# Patient Record
Sex: MALE | Race: WHITE | NOT HISPANIC OR LATINO | Employment: FULL TIME | URBAN - METROPOLITAN AREA
[De-identification: names, ages, dates, MRNs, and addresses within clinical notes are randomized per-mention and may not be internally consistent; named-entity substitution may affect disease eponyms.]

---

## 2017-01-16 ENCOUNTER — OFFICE VISIT (OUTPATIENT)
Dept: FAMILY MEDICINE | Facility: CLINIC | Age: 32
End: 2017-01-16
Payer: COMMERCIAL

## 2017-01-16 VITALS
HEART RATE: 72 BPM | RESPIRATION RATE: 16 BRPM | SYSTOLIC BLOOD PRESSURE: 110 MMHG | DIASTOLIC BLOOD PRESSURE: 62 MMHG | WEIGHT: 268 LBS | TEMPERATURE: 98.4 F | BODY MASS INDEX: 37.39 KG/M2

## 2017-01-16 DIAGNOSIS — F33.1 MAJOR DEPRESSIVE DISORDER, RECURRENT EPISODE, MODERATE (H): ICD-10-CM

## 2017-01-16 DIAGNOSIS — F41.1 GENERALIZED ANXIETY DISORDER: ICD-10-CM

## 2017-01-16 DIAGNOSIS — G47.26 SHIFT WORK SLEEP DISORDER: Primary | ICD-10-CM

## 2017-01-16 PROCEDURE — 99213 OFFICE O/P EST LOW 20 MIN: CPT | Performed by: PHYSICIAN ASSISTANT

## 2017-01-16 RX ORDER — QUETIAPINE FUMARATE 25 MG/1
50 TABLET, FILM COATED ORAL AT BEDTIME
Qty: 180 TABLET | Refills: 1 | Status: SHIPPED | OUTPATIENT
Start: 2017-01-16 | End: 2017-07-09

## 2017-01-16 ASSESSMENT — ANXIETY QUESTIONNAIRES
2. NOT BEING ABLE TO STOP OR CONTROL WORRYING: NOT AT ALL
3. WORRYING TOO MUCH ABOUT DIFFERENT THINGS: NOT AT ALL
IF YOU CHECKED OFF ANY PROBLEMS ON THIS QUESTIONNAIRE, HOW DIFFICULT HAVE THESE PROBLEMS MADE IT FOR YOU TO DO YOUR WORK, TAKE CARE OF THINGS AT HOME, OR GET ALONG WITH OTHER PEOPLE: NOT DIFFICULT AT ALL
GAD7 TOTAL SCORE: 1
6. BECOMING EASILY ANNOYED OR IRRITABLE: NOT AT ALL
5. BEING SO RESTLESS THAT IT IS HARD TO SIT STILL: NOT AT ALL
7. FEELING AFRAID AS IF SOMETHING AWFUL MIGHT HAPPEN: NOT AT ALL
1. FEELING NERVOUS, ANXIOUS, OR ON EDGE: NOT AT ALL

## 2017-01-16 ASSESSMENT — PATIENT HEALTH QUESTIONNAIRE - PHQ9: 5. POOR APPETITE OR OVEREATING: SEVERAL DAYS

## 2017-01-16 NOTE — NURSING NOTE
"Chief Complaint   Patient presents with     Recheck Medication       Initial /62 mmHg  Pulse 72  Temp(Src) 98.4  F (36.9  C) (Oral)  Resp 16  Wt 268 lb (121.564 kg) Estimated body mass index is 37.39 kg/(m^2) as calculated from the following:    Height as of 10/27/15: 5' 11\" (1.803 m).    Weight as of this encounter: 268 lb (121.564 kg).  BP completed using cuff size: joanne Castro MA      "

## 2017-01-16 NOTE — PROGRESS NOTES
SUBJECTIVE:                                                    Harrison Khan is a 31 year old male who presents to clinic today for the following health issues:      Medication Followup of Seroquel- insomnia    Taking Medication as prescribed: yes    Side Effects:  None    Medication Helping Symptoms:  yes     Kwaku is here to follow up insomnia, depression and anxiety.  He has a long standing problem with all three.  They have at times been very difficult to control.  The insomnia in particular to the point of concerns about all the medications he was taking.  He has not been in for a while now.  Today he reports to be just taking Seroquel.  NOthing specific for sleep at this time.  Takes 1-2 at night.  Anxiety and depression are well controlled.  Travelling a lot right now.  Working a lot.  Hours are variable right now but feeling OK with schedule.  Going to gym which is helping with mood etc.        PROBLEMS TO ADD ON...    Depression and Anxiety Follow-Up    Status since last visit: Improved    Other associated symptoms:None    Complicating factors:     Significant life event: No     Current substance abuse: None    PHQ-9 SCORE 3/30/2016 6/28/2016 1/16/2017   Total Score - - -   Total Score 0 5 1     ELY-7 SCORE 3/30/2016 6/28/2016 1/16/2017   Total Score - - -   Total Score 2 4 1        PHQ-9  English      PHQ-9   Any Language     GAD7     Problem list and histories reviewed & adjusted, as indicated.  Additional history: as documented    Problem list, Medication list, Allergies, and Medical/Social/Surgical histories reviewed in EPIC and updated as appropriate.    ROS:  Constitutional, HEENT, cardiovascular, pulmonary, gi and gu systems are negative, except as otherwise noted.    OBJECTIVE:                                                    /62 mmHg  Pulse 72  Temp(Src) 98.4  F (36.9  C) (Oral)  Resp 16  Wt 268 lb (121.564 kg)  Body mass index is 37.39 kg/(m^2).  GENERAL: healthy, alert and no  distress  PSYCH: mentation appears normal, affect normal/bright  No further exam 15 minutes total with discussion and counselling equal to >%50 of visit.    Diagnostic Test Results:  none      ASSESSMENT/PLAN:                                                    1. Shift work sleep disorder  Refilled seroquel, follow up 6 months, sooner prn.  - QUEtiapine (SEROQUEL) 25 MG tablet; Take 2 tablets (50 mg) by mouth At Bedtime Take 1-2 tabs PO qhs prn insomnia  Dispense: 180 tablet; Refill: 1    2. Major depressive disorder, recurrent episode, moderate (H)  - doing well.  Follow up prn.    PHQ-9 SCORE 3/30/2016 6/28/2016 1/16/2017   Total Score - - -   Total Score 0 5 1       3. Generalized anxiety disorder  - doing well, follow up prn.    ELY-7 SCORE 3/30/2016 6/28/2016 1/16/2017   Total Score - - -   Total Score 2 4 1           Eliseo Benitez PA-C  [facility]

## 2017-01-16 NOTE — Clinical Note
My Depression Action Plan  Name: Harrison Khan   Date of Birth 1985  Date: 1/16/2017    My doctor: Eliseo Benitez   My clinic: [address], [address]  [facility] [address] [phone]          GREEN    ZONE   Good Control    What it looks like:     Things are going generally well. You have normal up s and down s. You may even feel depressed from time to time, but bad moods usually last less than a day.   What you need to do:  1. Continue to care for yourself (see self care plan)  2. Check your depression survival kit and update it as needed  3. Follow your physician s recommendations including any medication.  4. Do not stop taking medication unless you consult with your physician first.           YELLOW         ZONE Getting Worse    What it looks like:     Depression is starting to interfere with your life.     It may be hard to get out of bed; you may be starting to isolate yourself from others.    Symptoms of depression are starting to last most all day and this has happened for several days.     You may have suicidal thoughts but they are not constant.   What you need to do:     1. Call your care team, your response to treatment will improve if you keep your care team informed of your progress. Yellow periods are signs an adjustment may need to be made.     2. Continue your self-care, even if you have to fake it!    3. Talk to someone in your support network    4. Open up your depression survival kit           RED    ZONE Medical Alert - Get Help    What it looks like:     Depression is seriously interfering with your life.     You may experience these or other symptoms: You can t get out of bed most days, can t work or engage in other necessary activities, you have trouble taking care of basic hygiene, or basic responsibilities, thoughts of suicide or death that will not go away, self-injurious behavior.     What you need to  do:  1. Call your care team and request a same-day appointment. If they are not available (weekends or after hours) call your local crisis line, emergency room or 911.      Electronically signed by: Lindsay Castro, January 16, 2017    Depression Self Care Plan / Survival Kit    Self-Care for Depression  Here s the deal. Your body and mind are really not as separate as most people think.  What you do and think affects how you feel and how you feel influences what you do and think. This means if you do things that people who feel good do, it will help you feel better.  Sometimes this is all it takes.  There is also a place for medication and therapy depending on how severe your depression is, so be sure to consult with your medical provider and/ or Behavioral Health Consultant if your symptoms are worsening or not improving.     In order to better manage my stress, I will:    Exercise  Get some form of exercise, every day. This will help reduce pain and release endorphins, the  feel good  chemicals in your brain. This is almost as good as taking antidepressants!  This is not the same as joining a gym and then never going! (they count on that by the way ) It can be as simple as just going for a walk or doing some gardening, anything that will get you moving.      Hygiene   Maintain good hygiene (Get out of bed in the morning, Make your bed, Brush your teeth, Take a shower, and Get dressed like you were going to work, even if you are unemployed).  If your clothes don't fit try to get ones that do.    Diet  I will strive to eat foods that are good for me, drink plenty of water, and avoid excessive sugar, caffeine, alcohol, and other mood-altering substances.  Some foods that are helpful in depression are: complex carbohydrates, B vitamins, flaxseed, fish or fish oil, fresh fruits and vegetables.    Psychotherapy  I agree to participate in Individual Therapy (if recommended).    Medication  If prescribed medications, I  agree to take them.  Missing doses can result in serious side effects.  I understand that drinking alcohol, or other illicit drug use, may cause potential side effects.  I will not stop my medication abruptly without first discussing it with my provider.    Staying Connected With Others  I will stay in touch with my friends, family members, and my primary care provider/team.    Use your imagination  Be creative.  We all have a creative side; it doesn t matter if it s oil painting, sand castles, or mud pies! This will also kick up the endorphins.    Witness Beauty  (AKA stop and smell the roses) Take a look outside, even in mid-winter. Notice colors, textures. Watch the squirrels and birds.     Service to others  Be of service to others.  There is always someone else in need.  By helping others we can  get out of ourselves  and remember the really important things.  This also provides opportunities for practicing all the other parts of the program.    Humor  Laugh and be silly!  Adjust your TV habits for less news and crime-drama and more comedy.    Control your stress  Try breathing deep, massage therapy, biofeedback, and meditation. Find time to relax each day.     My support system    Clinic Contact:  Phone number:    Contact 1:  Phone number:    Contact 2:  Phone number:    [employer]/:  Phone number:    Therapist:  Phone number:    Local crisis center:    Phone number:    Other community support:  Phone number:

## 2017-01-17 ASSESSMENT — ANXIETY QUESTIONNAIRES: GAD7 TOTAL SCORE: 1

## 2017-01-17 ASSESSMENT — PATIENT HEALTH QUESTIONNAIRE - PHQ9: SUM OF ALL RESPONSES TO PHQ QUESTIONS 1-9: 1

## 2017-03-30 ENCOUNTER — FCC EXTENDED DOCUMENTATION (OUTPATIENT)
Dept: PSYCHOLOGY | Facility: CLINIC | Age: 32
End: 2017-03-30

## 2017-03-30 NOTE — PROGRESS NOTES
Discharge Summary  Single Session    Client Name: Harrison Khan MRN#: [id number] YOB: 1985      Intake / Discharge Date: 6-28-17   /   3-30-17      DSM5 Diagnoses: (Sustained by DSM5 Criteria Listed Above)  Diagnoses: Anxiety  Psychosocial & Contextual Factors: work stress  WHODAS 2.0 (12 item) Score: n/a        Presenting Concern:  Anxiety, insomnia      Reason for Discharge:  Client did not return      Disposition at Time of Last Encounter:   Comments:   stable     Risk Management:       Client has had a history of suicidal ideation: 2011 had a job change , was concerned about job loss and financial ruin. Also had come out to his Christian family all around the same time. Denies a history of suicide attempts, self-injurious behavior, homicidal ideation, homicidal behavior and and other safety concerns  Client denies current fears or concerns for personal safety.  Client denies current or recent suicidal ideation or behaviors.  Client denies current or recent homicidal ideation or behaviors.  Client denies current or recent self injurious behavior or ideation.  Client denies other safety concerns.  Client reports there are firearms in the house. The firearms are secured in a locked space.  A safety and risk management plan has not been developed at this time, however client was given the after-hours number / 911 should there be a change in any of these risk factors.     Referred To:  -        Gloria Zamora LP   3/30/2017

## 2017-06-02 ENCOUNTER — OFFICE VISIT (OUTPATIENT)
Dept: FAMILY MEDICINE | Facility: CLINIC | Age: 32
End: 2017-06-02
Payer: COMMERCIAL

## 2017-06-02 VITALS
HEART RATE: 79 BPM | WEIGHT: 280 LBS | SYSTOLIC BLOOD PRESSURE: 118 MMHG | OXYGEN SATURATION: 97 % | BODY MASS INDEX: 39.05 KG/M2 | TEMPERATURE: 98.5 F | DIASTOLIC BLOOD PRESSURE: 65 MMHG

## 2017-06-02 DIAGNOSIS — E66.09 NON MORBID OBESITY DUE TO EXCESS CALORIES: Primary | ICD-10-CM

## 2017-06-02 DIAGNOSIS — F17.200 TOBACCO USE DISORDER: ICD-10-CM

## 2017-06-02 LAB — HBA1C MFR BLD: 4.9 % (ref 4.3–6)

## 2017-06-02 PROCEDURE — 36415 COLL VENOUS BLD VENIPUNCTURE: CPT | Performed by: PHYSICIAN ASSISTANT

## 2017-06-02 PROCEDURE — 84443 ASSAY THYROID STIM HORMONE: CPT | Performed by: PHYSICIAN ASSISTANT

## 2017-06-02 PROCEDURE — 99214 OFFICE O/P EST MOD 30 MIN: CPT | Performed by: PHYSICIAN ASSISTANT

## 2017-06-02 PROCEDURE — 80053 COMPREHEN METABOLIC PANEL: CPT | Performed by: PHYSICIAN ASSISTANT

## 2017-06-02 PROCEDURE — 83036 HEMOGLOBIN GLYCOSYLATED A1C: CPT | Performed by: PHYSICIAN ASSISTANT

## 2017-06-02 RX ORDER — PHENTERMINE HYDROCHLORIDE 15 MG/1
15 CAPSULE ORAL EVERY MORNING
Qty: 45 CAPSULE | Refills: 0 | Status: SHIPPED | OUTPATIENT
Start: 2017-06-02 | End: 2017-11-08

## 2017-06-02 RX ORDER — NICOTINE 21 MG/24HR
1 PATCH, TRANSDERMAL 24 HOURS TRANSDERMAL EVERY 24 HOURS
Qty: 30 PATCH | Refills: 0 | Status: SHIPPED | OUTPATIENT
Start: 2017-06-02 | End: 2017-11-08

## 2017-06-02 NOTE — MR AVS SNAPSHOT
After Visit Summary   6/2/2017    Harrison Khan    MRN: [id number]           Patient Information     Date Of Birth          1985        Visit Information        Provider Department      6/2/2017 3:20 PM Eliseo Benitez PA-C [facility]        Today's Diagnoses     Non morbid obesity due to excess calories    -  1    Tobacco use disorder          Care Instructions    The Mediterranean Diet can reduce your risk of Heart Disease and Stroke    Recommended:     Olive oil         >4 Tbs/day  Tree nuts       >3 handfuls/wk, prefer once daily 1/4 cup, Almonds, Walnuts, Hazelnuts preferred   Fresh Fruits   >3/day  Vegetables     >2/day  Fish, seafood >3/week  Legumes        >3/week  White meat     Instead of red meat  Wine with meals, optional, only for those who wish to drink, up to 7 drinks per week    Discouraged; Limit the following    Soda drinks                 <1/day  Commercial baked goods, sweets, pastries  <3/week  Spread fats                 <1/day  Red meat                    <1/day  Or processed meats  <1/day     Taken from [place] Journal of Medicine Feb 2013            Follow-ups after your visit        Follow-up notes from your care team     Return in about 1 month (around 7/2/2017).      Who to contact     If you have questions or need follow up information about today's clinic visit or your schedule please contact [facility] directly at [phone].  Normal or non-critical lab and imaging results will be communicated to you by MyChart, letter or phone within 4 business days after the clinic has received the results. If you do not hear from us within 7 days, please contact the clinic through MyChart or phone. If you have a critical or abnormal lab result, we will notify you by phone as soon as possible.  Submit refill requests through [employer] or call your pharmacy and they will forward the refill request to us. Please allow 3 business days  for your refill to be completed.          Additional Information About Your Visit        [employer] Information     [employer] gives you secure access to your electronic health record. If you see a primary care provider, you can also send messages to your care team and make appointments. If you have questions, please call your primary care clinic.  If you do not have a primary care provider, please call [phone] and they will assist you.        Care EveryWhere ID     This is your Care EveryWhere ID. This could be used by other organizations to access your [facility] medical records  [id number]        Your Vitals Were     Pulse Temperature Pulse Oximetry BMI (Body Mass Index)          79 98.5  F (36.9  C) (Oral) 97% 39.05 kg/m2         Blood Pressure from Last 3 Encounters:   06/02/17 118/65   01/16/17 110/62   06/22/16 122/54    Weight from Last 3 Encounters:   06/02/17 280 lb (127 kg)   01/16/17 268 lb (121.6 kg)   06/22/16 260 lb (117.9 kg)              We Performed the Following     Comprehensive metabolic panel     Hemoglobin A1c     TSH with free T4 reflex          Today's Medication Changes          These changes are accurate as of: 6/2/17  4:06 PM.  If you have any questions, ask your nurse or doctor.               Start taking these medicines.        Dose/Directions    * nicotine 21 MG/24HR 24 hr patch   Commonly known as:  NICODERM CQ   Used for:  Tobacco use disorder   Started by:  Eliseo Benitez PA-C        Dose:  1 patch   Place 1 patch onto the skin every 24 hours   Quantity:  30 patch   Refills:  0       * nicotine 14 MG/24HR 24 hr patch   Commonly known as:  NICODERM CQ   Used for:  Tobacco use disorder   Started by:  Eliseo Benitez PA-C        Dose:  1 patch   Place 1 patch onto the skin every 24 hours   Quantity:  30 patch   Refills:  0       * nicotine 7 MG/24HR 24 hr patch   Commonly known as:  NICODERM CQ   Used for:  Tobacco use disorder   Started by:  Eliseo Benitez PA-C         Dose:  1 patch   Place 1 patch onto the skin every 24 hours   Quantity:  30 patch   Refills:  1       phentermine 15 MG capsule   Used for:  Non morbid obesity due to excess calories   Started by:  Eliseo Benitez PA-C        Dose:  15 mg   Take 1 capsule (15 mg) by mouth every morning Can increase to two tabs after one week.   Quantity:  45 capsule   Refills:  0       * Notice:  This list has 3 medication(s) that are the same as other medications prescribed for you. Read the directions carefully, and ask your doctor or other care provider to review them with you.         Where to get your medicines      These medications were sent to [facility] [address] [facility] [address] AT [facility] [address] [facility] [address]     Phone:  [phone]     nicotine 14 MG/24HR 24 hr patch    nicotine 21 MG/24HR 24 hr patch    nicotine 7 MG/24HR 24 hr patch         Some of these will need a paper prescription and others can be bought over the counter.  Ask your nurse if you have questions.     Bring a paper prescription for each of these medications     phentermine 15 MG capsule                Primary Care Provider Office Phone # Fax #    Eliseo Benitez PA-C [phone]       [facility] [address]  [address] [facility] [address]        Thank you!     Thank you for choosing [facility]  for your care. Our goal is always to provide you with excellent care. Hearing back from our patients is one way we can continue to improve our services. Please take a few minutes to complete the written survey that you may receive in the mail after your visit with us. Thank you!             Your Updated Medication List - Protect others around you: Learn how to safely use, store and throw away your medicines at www.disposemymeds.org.          This list is accurate as of: 6/2/17  4:06 PM.  Always use your most recent med list.                    Brand Name Dispense Instructions for use    * nicotine 21 MG/24HR 24 hr patch    NICODERM CQ    30 patch    Place 1 patch onto the skin every 24 hours       * nicotine 14 MG/24HR 24 hr patch    NICODERM CQ    30 patch    Place 1 patch onto the skin every 24 hours       * nicotine 7 MG/24HR 24 hr patch    NICODERM CQ    30 patch    Place 1 patch onto the skin every 24 hours       phentermine 15 MG capsule     45 capsule    Take 1 capsule (15 mg) by mouth every morning Can increase to two tabs after one week.       QUEtiapine 25 MG tablet    SEROQUEL    180 tablet    Take 2 tablets (50 mg) by mouth At Bedtime Take 1-2 tabs PO qhs prn insomnia       VENTOLIN  (90 BASE) MCG/ACT Inhaler   Generic drug:  albuterol          * Notice:  This list has 3 medication(s) that are the same as other medications prescribed for you. Read the directions carefully, and ask your doctor or other care provider to review them with you.

## 2017-06-02 NOTE — PATIENT INSTRUCTIONS
The Mediterranean Diet can reduce your risk of Heart Disease and Stroke    Recommended:     Olive oil         >4 Tbs/day  Tree nuts       >3 handfuls/wk, prefer once daily 1/4 cup, Almonds, Walnuts, Hazelnuts preferred   Fresh Fruits   >3/day  Vegetables     >2/day  Fish, seafood >3/week  Legumes        >3/week  White meat     Instead of red meat  Wine with meals, optional, only for those who wish to drink, up to 7 drinks per week    Discouraged; Limit the following    Soda drinks                 <1/day  Commercial baked goods, sweets, pastries  <3/week  Spread fats                 <1/day  Red meat                    <1/day  Or processed meats  <1/day     Taken from [place] Journal of Medicine Feb 2013

## 2017-06-02 NOTE — PROGRESS NOTES
SUBJECTIVE:                                                    Harrison Khan is a 31 year old male who presents to clinic today for the following health issues:    Kwaku is here today for a consult on weight loss. Notes that he is still working out 4-5 times a week. Will work out on an elliptical, up to 8 miles. Reports that he needs help with his diet. Notes of cravings. He does try to meal prep and eat healthy but has some difficulty with this. Would also like medication help if possible. Notes that his dad is on metformin for diabetes. Reports that both of his parents are overweight.       PROBLEMS TO ADD ON...  Nicotine patches: Would also like to restart on patches. Notes that he chews and is spending too much on this.     Problem list and histories reviewed & adjusted, as indicated.  Additional history: as documented    BP Readings from Last 3 Encounters:   06/02/17 118/65   01/16/17 110/62   06/22/16 122/54    Wt Readings from Last 3 Encounters:   06/02/17 127 kg (280 lb)   01/16/17 121.6 kg (268 lb)   06/22/16 117.9 kg (260 lb)               Labs reviewed in EPIC    Reviewed and updated as needed this visit by clinical staff  Tobacco  Allergies  Problems  Med Hx  Surg Hx  Fam Hx  Soc Hx      Reviewed and updated as needed this visit by Provider         ROS:  CONSTITUTIONAL:NEGATIVE for fever, chills, change in weight, POSITIVE for weight gain  INTEGUMENTARY/SKIN: NEGATIVE for worrisome rashes, moles or lesions  CV: NEGATIVE for chest pain, palpitations or peripheral edema  MUSCULOSKELETAL: NEGATIVE for significant arthralgias or myalgia  PSYCHIATRIC: NEGATIVE for changes in mood or affect    This document serves as a record of the services and decisions personally performed and made by Eliseo Benitez PA-C. It was created on her behalf by Trista Glass, a trained medical scribe. The creation of this document is based the provider's statements to the medical scribe.  Trista Glass June 2, 2017  3:45 PM    OBJECTIVE:                                                    /65 (BP Location: Right arm, Patient Position: Chair, Cuff Size: Adult Large)  Pulse 79  Temp 98.5  F (36.9  C) (Oral)  Wt 127 kg (280 lb)  SpO2 97%  BMI 39.05 kg/m2  Body mass index is 39.05 kg/(m^2).  GENERAL: healthy, alert and no distress  EYES: Eyes grossly normal to inspection, PERRL and conjunctivae and sclerae normal  HENT: ear canals and TM's normal, nose and mouth without ulcers or lesions  CV: regular rate and rhythm, normal S1 S2, no S3 or S4, no murmur, click or rub, no peripheral edema and peripheral pulses strong  MS: no gross musculoskeletal defects noted, no edema  SKIN: no suspicious lesions or rashes  NEURO: Normal strength and tone, mentation intact and speech normal  PSYCH: mentation appears normal, affect normal/bright    Diagnostic Test Results:  none      ASSESSMENT/PLAN:                                                      1. Non morbid obesity due to excess calories  Will complete blood work today to rule out any causes for weight gain. Will also start on phentermine to help with diet suppression. Advised to start with one 15 mg tablet, if no side effects can increase to 30 mg after one week. Discussed continuing with exercise. Given Mediterranean diet as a guide to follow. Offered dietician referral, will hold off for now. Follow up in one month.   - phentermine 15 MG capsule; Take 1 capsule (15 mg) by mouth every morning Can increase to two tabs after one week.  Dispense: 45 capsule; Refill: 0  - Hemoglobin A1c  - Comprehensive metabolic panel  - TSH with free T4 reflex    2. Tobacco use disorder  Will restart on nicotine patches to help quit chewing. Follow up as needed.   - nicotine (NICODERM CQ) 21 MG/24HR 24 hr patch; Place 1 patch onto the skin every 24 hours  Dispense: 30 patch; Refill: 0  - nicotine (NICODERM CQ) 14 MG/24HR 24 hr patch; Place 1 patch onto the skin every 24 hours  Dispense: 30 patch;  Refill: 0  - nicotine (NICODERM CQ) 7 MG/24HR 24 hr patch; Place 1 patch onto the skin every 24 hours  Dispense: 30 patch; Refill: 1      The information in this document, created by the medical scribe for me, accurately reflects the services I personally performed and the decisions made by me. I have reviewed and approved this document for accuracy prior to leaving the patient care area.  3:45 PM 6/2/2017          Eliseo Benitez PA-C  [facility]

## 2017-06-02 NOTE — NURSING NOTE
"Chief Complaint   Patient presents with     Weight Problem       Initial /65 (BP Location: Right arm, Patient Position: Chair, Cuff Size: Adult Large)  Pulse 79  Temp 98.5  F (36.9  C) (Oral)  Wt 280 lb (127 kg)  SpO2 97%  BMI 39.05 kg/m2 Estimated body mass index is 39.05 kg/(m^2) as calculated from the following:    Height as of 10/27/15: 5' 11\" (1.803 m).    Weight as of this encounter: 280 lb (127 kg).  Medication Reconciliation: complete   Lindsay Castro MA      "

## 2017-06-05 LAB
ALBUMIN SERPL-MCNC: 4.2 G/DL (ref 3.4–5)
ALP SERPL-CCNC: 93 U/L (ref 40–150)
ALT SERPL W P-5'-P-CCNC: 56 U/L (ref 0–70)
ANION GAP SERPL CALCULATED.3IONS-SCNC: 9 MMOL/L (ref 3–14)
AST SERPL W P-5'-P-CCNC: 23 U/L (ref 0–45)
BILIRUB SERPL-MCNC: 0.5 MG/DL (ref 0.2–1.3)
BUN SERPL-MCNC: 16 MG/DL (ref 7–30)
CALCIUM SERPL-MCNC: 8.3 MG/DL (ref 8.5–10.1)
CHLORIDE SERPL-SCNC: 104 MMOL/L (ref 94–109)
CO2 SERPL-SCNC: 27 MMOL/L (ref 20–32)
CREAT SERPL-MCNC: 1.11 MG/DL (ref 0.66–1.25)
GFR SERPL CREATININE-BSD FRML MDRD: 78 ML/MIN/1.7M2
GLUCOSE SERPL-MCNC: 95 MG/DL (ref 70–99)
POTASSIUM SERPL-SCNC: 4.3 MMOL/L (ref 3.4–5.3)
PROT SERPL-MCNC: 7.5 G/DL (ref 6.8–8.8)
SODIUM SERPL-SCNC: 140 MMOL/L (ref 133–144)
TSH SERPL DL<=0.005 MIU/L-ACNC: 0.94 MU/L (ref 0.4–4)

## 2017-06-22 ENCOUNTER — TELEPHONE (OUTPATIENT)
Dept: FAMILY MEDICINE | Facility: CLINIC | Age: 32
End: 2017-06-22

## 2017-06-22 NOTE — TELEPHONE ENCOUNTER
Insurance Requires Prior Authorization    Provider:  Eliseo Benitez    Medication/SIG: phentermine 15 MG capsule  Sig: Take 1 capsule (15 mg) by mouth every morning Can increase to two tabs after one week.    Pharmacy: [facility] [place]    Prior auth submitted from form and Faxed to insurance.    CHAKA Huynh  June 22, 2017  1:49 PM

## 2017-06-27 NOTE — TELEPHONE ENCOUNTER
Denied    45 per 30 days, but...    APPROVED    30 per 30 days    Pharm informed    Xochilt Preston, XRT  June 27, 2017  8:30 AM

## 2017-07-09 DIAGNOSIS — E66.09 NON MORBID OBESITY DUE TO EXCESS CALORIES: ICD-10-CM

## 2017-07-09 DIAGNOSIS — G47.26 SHIFT WORK SLEEP DISORDER: ICD-10-CM

## 2017-07-10 NOTE — TELEPHONE ENCOUNTER
QUEtiapine (SEROQUEL) 25 MG tablet     Last Written Prescription Date: 1/16/17  Last Fill Quantity: 180, # refills: 1  Last Office Visit with [facility] or  Health prescribing provider: 6/2/2017       BP Readings from Last 3 Encounters:   06/02/17 118/65   01/16/17 110/62   06/22/16 122/54     Pulse Readings from Last 2 Encounters:   06/02/17 79   01/16/17 72     Lab Results   Component Value Date    GLC 95 06/02/2017     Lab Results   Component Value Date    WBC 13.7 10/27/2015     Lab Results   Component Value Date    RBC 4.96 10/27/2015     Lab Results   Component Value Date    HGB 16.3 10/27/2015     Lab Results   Component Value Date    HCT 44.1 10/27/2015     No components found for: MCT  Lab Results   Component Value Date    MCV 89 10/27/2015     Lab Results   Component Value Date    MCH 32.9 10/27/2015     Lab Results   Component Value Date    MCHC 37.0 10/27/2015     Lab Results   Component Value Date    RDW 12.5 10/27/2015     Lab Results   Component Value Date     10/27/2015     Lab Results   Component Value Date    CHOL 150 04/08/2009     Lab Results   Component Value Date    HDL 34 04/08/2009     Lab Results   Component Value Date    LDL 97 04/08/2009     Lab Results   Component Value Date    TRIG 99 04/08/2009     Lab Results   Component Value Date    CHOLHDLRATIO 4.5 04/08/2009     ________________________________________________________________________________    phentermine 15 MG capsule      Sig: Take 1 capsule (15 mg) by mouth every morning Can increase to two tabs after one week.    Last Written Prescription Date:  6/2/17  Last Fill Quantity: 45,   # refills: 0  Last [facility] Visit with [facility] or  Health prescribing provider: 6/2/2017  Future Office visit:       Wt Readings from Last 3 Encounters:   06/02/17 280 lb (127 kg)   01/16/17 268 lb (121.6 kg)   06/22/16 260 lb (117.9 kg)       BP Readings from Last 3 Encounters:   06/02/17 118/65   01/16/17 110/62   06/22/16 122/54          Routing refill request to provider for review/approval because:  Drug not on the [facility] or [facility] refill protocol or controlled substance    CHAKA Huynh  July 10, 2017  9:08 AM

## 2017-07-10 NOTE — TELEPHONE ENCOUNTER
Routing refill request to provider for review/approval because:  Drug not on the [facility] refill protocol - Phentermine, need updated directions on rx  Labs not current:  Annual CBC, BS, lipids, BP recommended    Faith Bates RN, BS  Clinical Nurse Triage.

## 2017-07-13 RX ORDER — PHENTERMINE HYDROCHLORIDE 37.5 MG/1
37.5 CAPSULE ORAL EVERY MORNING
Qty: 30 CAPSULE | Refills: 0 | Status: SHIPPED | OUTPATIENT
Start: 2017-07-13 | End: 2017-11-08

## 2017-07-13 RX ORDER — PHENTERMINE HYDROCHLORIDE 15 MG/1
CAPSULE ORAL
Qty: 45 CAPSULE | Refills: 0 | OUTPATIENT
Start: 2017-07-13

## 2017-07-13 RX ORDER — QUETIAPINE FUMARATE 25 MG/1
TABLET, FILM COATED ORAL
Qty: 180 TABLET | Refills: 0 | Status: SHIPPED | OUTPATIENT
Start: 2017-07-13 | End: 2017-10-25

## 2017-07-13 NOTE — TELEPHONE ENCOUNTER
Rx was faxed to [facility], pharmacy will notify patient when ready to be picked up.   Filed at  desk.     Jose Carlos Yadav   07/13/17

## 2017-11-08 ENCOUNTER — OFFICE VISIT (OUTPATIENT)
Dept: URGENT CARE | Facility: URGENT CARE | Age: 32
End: 2017-11-08
Payer: COMMERCIAL

## 2017-11-08 VITALS
DIASTOLIC BLOOD PRESSURE: 70 MMHG | HEIGHT: 71 IN | WEIGHT: 265.2 LBS | SYSTOLIC BLOOD PRESSURE: 116 MMHG | HEART RATE: 73 BPM | OXYGEN SATURATION: 98 % | BODY MASS INDEX: 37.13 KG/M2

## 2017-11-08 DIAGNOSIS — L03.031 PARONYCHIA OF GREAT TOE, RIGHT: Primary | ICD-10-CM

## 2017-11-08 PROCEDURE — 99213 OFFICE O/P EST LOW 20 MIN: CPT | Performed by: FAMILY MEDICINE

## 2017-11-08 RX ORDER — SULFAMETHOXAZOLE/TRIMETHOPRIM 800-160 MG
1 TABLET ORAL 2 TIMES DAILY
Qty: 20 TABLET | Refills: 0 | Status: SHIPPED | OUTPATIENT
Start: 2017-11-08 | End: 2017-11-14

## 2017-11-08 NOTE — PATIENT INSTRUCTIONS
Paronychia of the Finger or Toe  Paronychia is an infection near a fingernail or toenail. It usually occurs when an opening in the cuticle or an ingrown toenail lets bacteria under the skin.  The infection will need to be drained if pus is present. If the infection has been caught early, you may need only antibiotic treatment. Healing will take about 1 to 2 weeks.  Home care  Follow these guidelines when caring for yourself at home:    Clean and soak the toe or finger. Do this 2 times a day for the first 3 days. To do so:    Soak your foot or hand in a tub of warm water for 5 minutes. Or hold your toe or finger under a faucet of warm running water for 5 minutes.    Clean any crust away with soap and water using a cotton swab.    Put antibiotic ointment on the infected area.    Change the dressing daily or any time it gets dirty.    If you were given antibiotics, take them as directed until they are all gone.    If your infection is on a toe, wear comfortable shoes with a lot of toe room. You can also wear open-toed sandals while your toe heals.    You may use over-the-counter medicine (acetaminophen or ibuprofen to help with pain, unless another medicine was prescribed. If you have chronic liver or kidney disease, talk with your healthcare provider before using these medicines. Also talk with your provider if you've had a stomach ulcer or GI (gastrointestinal) bleeding.  Prevention  The following can prevent paronychia:    Avoid cutting or playing with your cuticles at home.    Don't bite your nails.    Don't suck on your thumbs or fingers.  Follow-up care  Follow up with your healthcare provider, or as advised.  When to seek medical advice  Call your healthcare provider right away if any of these occur:    Redness, pain, or swelling of the finger or toe gets worse    Red streaks in the skin leading away from the wound    Pus or fluid draining from the nail area    Fever of 100.4 F (38 C) or higher, or as directed  by your provider  Date Last Reviewed: 8/1/2016 2000-2017 [employer]. [address] [place] [address], [place] [address]. All rights reserved. This information is not intended as a substitute for professional medical care. Always follow your healthcare professional's instructions.

## 2017-11-08 NOTE — NURSING NOTE
"Chief Complaint   Patient presents with     Ingrown Toenail     right great toe       Initial /70 (BP Location: Right arm, Patient Position: Chair, Cuff Size: Adult Large)  Pulse 73  Ht 5' 11\" (1.803 m)  Wt 265 lb 3.2 oz (120.3 kg)  SpO2 98%  BMI 36.99 kg/m2 Estimated body mass index is 36.99 kg/(m^2) as calculated from the following:    Height as of this encounter: 5' 11\" (1.803 m).    Weight as of this encounter: 265 lb 3.2 oz (120.3 kg).  Medication Reconciliation: complete      Health Maintenance addressed:  PHQ9    N/a    FADI Little        "

## 2017-11-08 NOTE — PROGRESS NOTES
SUBJECTIVE:  Chief Complaint   Patient presents with     Ingrown Toenail     right great toe     Harrison Khan is a 32 year old male who presents complaining of right foot    first digit pain.  He has noted some redness and swelling along the cuticle margin.  Symptoms began two days ago.   Severity: moderate.  He denies any trauma to the area, though he did pull out a hang-nail on that toe 2 days ago.  No fevers or chills noted.  No migration of redness or swelling proximally.    Past Medical History:   Diagnosis Date     Allergic rhinitis, cause unspecified      Attention deficit disorder without mention of hyperactivity      Chronic insomnia 3/3/2014     Generalised anxiety disorder 7/6/2012     Migraine     Occasional hemiplegic characteristics     Obsessive-compulsive disorders     sari high       ALLERGIES:  Wellbutrin [bupropion] and Penicillins      Current Outpatient Prescriptions on File Prior to Visit:  QUEtiapine (SEROQUEL) 25 MG tablet TAKE 1 TO 2 TABLETS BY MOUTH EVERY NIGHT AT BEDTIME AS NEEDED FOR INSOMNIA.   VENTOLIN  (90 BASE) MCG/ACT inhaler      No current facility-administered medications on file prior to visit.     Social History   Substance Use Topics     Smoking status: Former Smoker     Types: Cigarettes     Smokeless tobacco: Current User     Types: Chew     Alcohol use 0.0 oz/week     0 Standard drinks or equivalent per week      Comment: occasional       Family History   Problem Relation Age of Onset     Hypertension Mother      Hypertension Father      Cardiovascular Maternal Grandmother      Cardiovascular Maternal Grandfather      Cardiovascular Paternal Grandmother      Cardiovascular Paternal Grandfather        ROS:  CONSTITUTIONAL:NEGATIVE for fever, chills, change in weight  EYES: NEGATIVE for vision changes or irritation  ENT/MOUTH: NEGATIVE for ear, mouth and throat problems  GI: NEGATIVE for nausea, abdominal pain   MUSCULOSKELETAL: NEGATIVE for significant  "arthralgias or myalgia    OBJECTIVE:  /70 (BP Location: Right arm, Patient Position: Chair, Cuff Size: Adult Large)  Pulse 73  Ht 5' 11\" (1.803 m)  Wt 265 lb 3.2 oz (120.3 kg)  SpO2 98%  BMI 36.99 kg/m2  right   Foot exam:  examination of first digit reveals paronychia with redness, tenderness and swelling along the distal digit at the nail margin.      GENERAL APPEARANCE: healthy, alert and no distress  MS:extremities normal- no gross deformities noted,  FROM noted in all extremities  NEURO: Normal strength and tone, sensory exam grossly normal,  normal speech and mentation  SKIN: no suspicious lesions or rashes      ASSESSMENT:  Paronychia of great toe, right     - sulfamethoxazole-trimethoprim (BACTRIM DS/SEPTRA DS) 800-160 MG per tablet; Take 1 tablet by mouth 2 times daily      Differential diagnosis,  Local cellulitis. Trauma, sprain of the toe     The distal toe was treated with hydrogen peroxide and a cotton tipped swab,  the nail margin from the adjacent skin tissue with drainage and debris removed from the infected area.  Purulent fluid was drained.  No complications.   Digital block was not used.    Patient was advised to perform wound cleaning at home twice daily with peroxide and cotton swabs to clean the depth of the groove of the nail margin.   patient was advised to perform warm water soaks twice daily.     Patient was advised that future infections may resolve without antibiotics with good cleaning of the groove at the margin of the nail and with drainage of any purulent material,  pulling the adjacent skin away from the nail.  The importance of wide shoes was stressed, since a tight shoe tends to push the toenail into the skin promoting infection.       "

## 2017-11-08 NOTE — MR AVS SNAPSHOT
After Visit Summary   11/8/2017    Harrison Khan    MRN: [id number]           Patient Information     Date Of Birth          1985        Visit Information        Provider Department      11/8/2017 5:00 PM Barbara Calvin MD [facility]        Today's Diagnoses     Paronychia of great toe, right    -  1      Care Instructions      Paronychia of the Finger or Toe  Paronychia is an infection near a fingernail or toenail. It usually occurs when an opening in the cuticle or an ingrown toenail lets bacteria under the skin.  The infection will need to be drained if pus is present. If the infection has been caught early, you may need only antibiotic treatment. Healing will take about 1 to 2 weeks.  Home care  Follow these guidelines when caring for yourself at home:    Clean and soak the toe or finger. Do this 2 times a day for the first 3 days. To do so:    Soak your foot or hand in a tub of warm water for 5 minutes. Or hold your toe or finger under a faucet of warm running water for 5 minutes.    Clean any crust away with soap and water using a cotton swab.    Put antibiotic ointment on the infected area.    Change the dressing daily or any time it gets dirty.    If you were given antibiotics, take them as directed until they are all gone.    If your infection is on a toe, wear comfortable shoes with a lot of toe room. You can also wear open-toed sandals while your toe heals.    You may use over-the-counter medicine (acetaminophen or ibuprofen to help with pain, unless another medicine was prescribed. If you have chronic liver or kidney disease, talk with your healthcare provider before using these medicines. Also talk with your provider if you've had a stomach ulcer or GI (gastrointestinal) bleeding.  Prevention  The following can prevent paronychia:    Avoid cutting or playing with your cuticles at home.    Don't bite your nails.    Don't suck on your thumbs or  fingers.  Follow-up care  Follow up with your healthcare provider, or as advised.  When to seek medical advice  Call your healthcare provider right away if any of these occur:    Redness, pain, or swelling of the finger or toe gets worse    Red streaks in the skin leading away from the wound    Pus or fluid draining from the nail area    Fever of 100.4 F (38 C) or higher, or as directed by your provider  Date Last Reviewed: 8/1/2016 2000-2017 [employer]. [address] [place] [address]. All rights reserved. This information is not intended as a substitute for professional medical care. Always follow your healthcare professional's instructions.                Follow-ups after your visit        Your next 10 appointments already scheduled     Nov 14, 2017 10:20 AM CST   PHYSICAL with Eliseo Benitez PA-C   [facility]    [id number] [place] [address], [address]  [facility] [address] [phone]              Who to contact     If you have questions or need follow up information about today's clinic visit or your schedule please contact [facility] directly at [phone].  Normal or non-critical lab and imaging results will be communicated to you by MyChart, letter or phone within 4 business days after the clinic has received the results. If you do not hear from us within 7 days, please contact the clinic through MyChart or phone. If you have a critical or abnormal lab result, we will notify you by phone as soon as possible.  Submit refill requests through [employer] or call your pharmacy and they will forward the refill request to us. Please allow 3 business days for your refill to be completed.          Additional Information About Your Visit        [facility] Information     [employer] gives you secure access to your electronic health record. If you see a primary care provider, you can also send messages to your care team and  "make appointments. If you have questions, please call your primary care clinic.  If you do not have a primary care provider, please call [phone] and they will assist you.        Care EveryWhere ID     This is your Care EveryWhere ID. This could be used by other organizations to access your [facility] medical records  [id number]        Your Vitals Were     Pulse Height Pulse Oximetry BMI (Body Mass Index)          73 5' 11\" (1.803 m) 98% 36.99 kg/m2         Blood Pressure from Last 3 Encounters:   11/08/17 116/70   06/02/17 118/65   01/16/17 110/62    Weight from Last 3 Encounters:   11/08/17 265 lb 3.2 oz (120.3 kg)   06/02/17 280 lb (127 kg)   01/16/17 268 lb (121.6 kg)              Today, you had the following     No orders found for display         Today's Medication Changes          These changes are accurate as of: 11/8/17  5:17 PM.  If you have any questions, ask your nurse or doctor.               Start taking these medicines.        Dose/Directions    sulfamethoxazole-trimethoprim 800-160 MG per tablet   Commonly known as:  BACTRIM DS/SEPTRA DS   Used for:  Paronychia of great toe, right   Started by:  Barbara Calvin MD        Dose:  1 tablet   Take 1 tablet by mouth 2 times daily   Quantity:  20 tablet   Refills:  0            Where to get your medicines      These medications were sent to [facility] [address] [place] [address] [address] [address]  [address] [facility] [address]     Phone:  [phone]     sulfamethoxazole-trimethoprim 800-160 MG per tablet                Primary Care Provider Office Phone # Fax #    Eliseo Benitez PA-C [phone] [id number]  [facility] [address]        Equal Access to Services     MILDRED EARL AH: Azra quiros Soalberto, waandresda luqadaha, qaybta kaalcarlton an, parmjit [place]. [facility] [phone].    ATENCIÓN: Si judah lobo " disposición servicios gratuitos de asistencia lingüística. Pearl carrion [phone].    We comply with applicable federal civil rights laws and [place] laws. We do not discriminate on the basis of race, color, national origin, age, disability, sex, sexual orientation, or gender identity.            Thank you!     Thank you for choosing [facility]  for your care. Our goal is always to provide you with excellent care. Hearing back from our patients is one way we can continue to improve our services. Please take a few minutes to complete the written survey that you may receive in the mail after your visit with us. Thank you!             Your Updated Medication List - Protect others around you: Learn how to safely use, store and throw away your medicines at www.disposemymeds.org.          This list is accurate as of: 11/8/17  5:17 PM.  Always use your most recent med list.                   Brand Name Dispense Instructions for use Diagnosis    QUEtiapine 25 MG tablet    SEROquel    180 tablet    TAKE 1 TO 2 TABLETS BY MOUTH EVERY NIGHT AT BEDTIME AS NEEDED FOR INSOMNIA.    Shift work sleep disorder       sulfamethoxazole-trimethoprim 800-160 MG per tablet    BACTRIM DS/SEPTRA DS    20 tablet    Take 1 tablet by mouth 2 times daily    Paronychia of great toe, right       VENTOLIN  (90 BASE) MCG/ACT Inhaler   Generic drug:  albuterol

## 2017-11-11 ENCOUNTER — OFFICE VISIT (OUTPATIENT)
Dept: URGENT CARE | Facility: URGENT CARE | Age: 32
End: 2017-11-11
Payer: COMMERCIAL

## 2017-11-11 ENCOUNTER — NURSE TRIAGE (OUTPATIENT)
Dept: NURSING | Facility: CLINIC | Age: 32
End: 2017-11-11

## 2017-11-11 VITALS
TEMPERATURE: 98.2 F | HEART RATE: 87 BPM | OXYGEN SATURATION: 97 % | WEIGHT: 265 LBS | BODY MASS INDEX: 37.1 KG/M2 | DIASTOLIC BLOOD PRESSURE: 60 MMHG | HEIGHT: 71 IN | SYSTOLIC BLOOD PRESSURE: 122 MMHG

## 2017-11-11 DIAGNOSIS — T78.40XA ALLERGIC REACTION TO DRUG, INITIAL ENCOUNTER: ICD-10-CM

## 2017-11-11 DIAGNOSIS — L03.031 PARONYCHIA OF GREAT TOE, RIGHT: Primary | ICD-10-CM

## 2017-11-11 PROCEDURE — 99213 OFFICE O/P EST LOW 20 MIN: CPT | Performed by: FAMILY MEDICINE

## 2017-11-11 RX ORDER — CLINDAMYCIN HCL 300 MG
300 CAPSULE ORAL 4 TIMES DAILY
Qty: 40 CAPSULE | Refills: 0 | Status: SHIPPED | OUTPATIENT
Start: 2017-11-11 | End: 2017-11-21

## 2017-11-11 NOTE — PATIENT INSTRUCTIONS
General Allergic Reactions  An allergic reaction is a set of symptoms caused by an allergen. An allergen is something that causes a person s immune system to react. When a person comes in contact with an allergen, it causes the body to release chemicals. These include the chemical histamine. Histamine causes swelling and itching. It may affect the entire body. This is called a general allergic reaction. Often symptoms affect only 1 part of the body. This is called a local allergic reaction.  You are having an allergic reaction. Almost anything can cause one. Different people are allergic to different things. It is usually something that you ate or swallowed, came into contact with by getting or putting it on your skin or clothes, or something you breathed in the air. This can be very annoying and sometimes scary.  Most of us think of allergic reactions when we have a rash or itchy skin. Symptoms can include:    Itching of the eyes, nose, and roof of the mouth    Runny or stuffy nose    Watery eyes     Sneezing or coughing     A blocked feeling in the ear    Red, itchy rash called hives    Red and purple spots    Rash, redness, welts, blisters    Itching, burning, stinging, pain    Dry, flaky, cracking, scaly skin  Severe symptoms include:    Swelling of the face, lips, or other parts of the body    Hoarse voice    Trouble swallowing, feeling like your throat is closing    Trouble breathing, wheezing    Nausea, vomiting, diarrhea, stomach cramps    Feeling faint or lightheaded, rapid heart rate  Sometimes the cause may be obvious. But there are so many things that can cause a reaction that you may not be able to figure out. The most important things to help find your allergen are:    Remembering when it started    What you were doing at the time or just before that    Any activities you were involved in    Any new products or contacts  Below are some common causes. But remember that almost anything can cause a  reaction. You may not even be aware that you came into contact with one of these things:    Dust, mold, pollen    Plants (common ones are poison ivy and poison oak, but there are many others)     Animals    Foods such as shrimp, shellfish, peanuts, milk products, gluten, and eggs. Also food colorings, flavorings, and additives.    Insect bites or stings such as bees, mosquitos, fleas, ticks    Medicines such as penicillin, sulfa medicines, amoxicillin, aspirin, and ibuprofen. But any medicine can cause a reaction.    Jewelry such as nickel or gold. This can be new, or something you ve worn for a while, including zippers and buttons.    Latex such as in gloves, clothes, toys, balloons, or some tapes. Some people allergic to latex may also have problems with foods like bananas, avocados, kiwi, papaya, or chestnuts.    Lotions, perfumes, cosmetics, soaps, shampoos, skincare products, nail products    Chemicals or dyes in clothing, linen, , hair dyes, soaps, iodine  Many viruses and common colds can cause a rash that is not an allergic reaction. Sometimes it is hard to tell the difference between allergies, sensitivity, or an intolerance to something. This is especially true with food. Many things can cause diarrhea, vomiting, stomach cramps, and skin irritation.  Home care    The goal of treatment is to help relieve the symptoms and get you feeling better. The rash will usually fade over several days. But it can sometimes last a couple of weeks. Over the next couple of days, there may be times when it is gets a little worse, and then better again. Here are some things to do:    If you know what you are allergic to, stay away from it. Future reactions could be worse than this one.    Avoid tight clothing and anything that heats up your skin (hot showers or baths, direct sunlight). Heat will make itching worse.    An ice pack will relieve local areas of intense itching and redness. To make an ice pack, put ice  cubes in a plastic bag that seals at the top. Wrap it in a thin, clean towel. Don t put the ice directly on the skin because it can damage the skin.    Oral diphenhydramine is an over-the-counter antihistamine sold at pharmacy and grocery stores. Unless a prescription antihistamine was given, diphenhydramine may be used to reduce itching if large areas of the skin are involved. It may make you sleepy. So be careful using it in the daytime or when going to school, working, or driving. Note: Don t use diphenhydramine if you have glaucoma or if you are a man with trouble urinating due to an enlarged prostate. There are other antihistamines that won t make you so sleepy. These are good choices for daytime use. Ask your pharmacist for suggestions.    Don t use diphenhydramine cream on your skin. It can cause a further reaction in some people.    To help prevent an infection, don't scratch the affected area. Scratching may worsen the reaction and damage your skin. It can also lead to an infection. Always check the affected for signs of an infection.    Call your healthcare provider and ask what you can use to help decrease the itching.    To decrease allergic reactions, try the following:      Use heat-steam to clean your home    Use high-efficiency particulate (HEPA) vacuums and filters    Stay away from food and pet triggers    Kill any cockroaches    Clean your house often  Follow-up care  Follow up with your healthcare provider, or as advised. If you had a severe reaction today, or if you have had several mild to medium allergic reactions in the past, ask your provider about allergy testing. This can help you find out what you are allergic to. If your reaction included dizziness, fainting, or trouble breathing or swallowing, ask your provider about carrying auto-injectable epinephrine.  Call 911  Call 911 if any of these occur:    Trouble breathing or swallowing, wheezing    Cool, moist, pale skin    Shortness of  breath    Hoarse voice or trouble speaking    Confused     Very drowsy or trouble awakening    Fainting or loss of consciousness    Rapid heart rate    Feeling of dizziness or weakness or a sudden drop in blood pressure    Feeling of doom    Feeling lightheaded    Severe nausea or vomiting, or diarrhea    Seizure    Swelling in the face, eyelids, lips, mouth, throat or tongue    Drooling  When to seek medical advice  Call your healthcare provider right away if any of these occur:    Spreading areas of itching, redness or swelling    Nausea or stomach cramps or abdominal pain    Continuing or recurring symptoms    Spreading areas of redness, swelling, or itching    Signs of infection at the affected site:    Spreading redness    Increased pain or swelling    Fluid or colored drainage from the site    Fever of 100.4 F (38 C) or above lasting for 24 to 48 hours, or as directed by your provider  Date Last Reviewed: 3/1/2017    3411-2377 [employer]. [address] [place] [address]. All rights reserved. This information is not intended as a substitute for professional medical care. Always follow your healthcare professional's instructions.

## 2017-11-11 NOTE — TELEPHONE ENCOUNTER
"Is taking Bactrim DS for infected toe. Developed hives yesterday and they are \"full blown\" today.  I instructed he be seen for documentation and new abx. Toe is improving. No swelling of face, tongue, lips throat.   Will go to [facility], now.  Vanessa SIMPSON RN [facility]     "

## 2017-11-11 NOTE — NURSING NOTE
"Harrison Khan;   Chief Complaint   Patient presents with     Allergic Reaction     hives onset on thighs yesterday, taking bactrim - denies shortness of breath      Urgent Care     Initial /60 (BP Location: Right arm, Patient Position: Chair, Cuff Size: Adult Large)  Pulse 87  Temp 98.2  F (36.8  C) (Oral)  Ht 5' 11\" (1.803 m)  Wt 265 lb (120.2 kg)  SpO2 97%  BMI 36.96 kg/m2 Estimated body mass index is 36.96 kg/(m^2) as calculated from the following:    Height as of this encounter: 5' 11\" (1.803 m).    Weight as of this encounter: 265 lb (120.2 kg)..  BP completed using cuff size large.  Gely Chapa R.N.  "

## 2017-11-11 NOTE — MR AVS SNAPSHOT
After Visit Summary   11/11/2017    Harrison Khan    MRN: [id number]           Patient Information     Date Of Birth          1985        Visit Information        Provider Department      11/11/2017 3:30 PM Barbara Calvin MD [facility]        Today's Diagnoses     Paronychia of great toe, right    -  1      Care Instructions      General Allergic Reactions  An allergic reaction is a set of symptoms caused by an allergen. An allergen is something that causes a person s immune system to react. When a person comes in contact with an allergen, it causes the body to release chemicals. These include the chemical histamine. Histamine causes swelling and itching. It may affect the entire body. This is called a general allergic reaction. Often symptoms affect only 1 part of the body. This is called a local allergic reaction.  You are having an allergic reaction. Almost anything can cause one. Different people are allergic to different things. It is usually something that you ate or swallowed, came into contact with by getting or putting it on your skin or clothes, or something you breathed in the air. This can be very annoying and sometimes scary.  Most of us think of allergic reactions when we have a rash or itchy skin. Symptoms can include:    Itching of the eyes, nose, and roof of the mouth    Runny or stuffy nose    Watery eyes     Sneezing or coughing     A blocked feeling in the ear    Red, itchy rash called hives    Red and purple spots    Rash, redness, welts, blisters    Itching, burning, stinging, pain    Dry, flaky, cracking, scaly skin  Severe symptoms include:    Swelling of the face, lips, or other parts of the body    Hoarse voice    Trouble swallowing, feeling like your throat is closing    Trouble breathing, wheezing    Nausea, vomiting, diarrhea, stomach cramps    Feeling faint or lightheaded, rapid heart rate  Sometimes the cause may be obvious. But there  are so many things that can cause a reaction that you may not be able to figure out. The most important things to help find your allergen are:    Remembering when it started    What you were doing at the time or just before that    Any activities you were involved in    Any new products or contacts  Below are some common causes. But remember that almost anything can cause a reaction. You may not even be aware that you came into contact with one of these things:    Dust, mold, pollen    Plants (common ones are poison ivy and poison oak, but there are many others)     Animals    Foods such as shrimp, shellfish, peanuts, milk products, gluten, and eggs. Also food colorings, flavorings, and additives.    Insect bites or stings such as bees, mosquitos, fleas, ticks    Medicines such as penicillin, sulfa medicines, amoxicillin, aspirin, and ibuprofen. But any medicine can cause a reaction.    Jewelry such as nickel or gold. This can be new, or something you ve worn for a while, including zippers and buttons.    Latex such as in gloves, clothes, toys, balloons, or some tapes. Some people allergic to latex may also have problems with foods like bananas, avocados, kiwi, papaya, or chestnuts.    Lotions, perfumes, cosmetics, soaps, shampoos, skincare products, nail products    Chemicals or dyes in clothing, linen, , hair dyes, soaps, iodine  Many viruses and common colds can cause a rash that is not an allergic reaction. Sometimes it is hard to tell the difference between allergies, sensitivity, or an intolerance to something. This is especially true with food. Many things can cause diarrhea, vomiting, stomach cramps, and skin irritation.  Home care    The goal of treatment is to help relieve the symptoms and get you feeling better. The rash will usually fade over several days. But it can sometimes last a couple of weeks. Over the next couple of days, there may be times when it is gets a little worse, and then better  again. Here are some things to do:    If you know what you are allergic to, stay away from it. Future reactions could be worse than this one.    Avoid tight clothing and anything that heats up your skin (hot showers or baths, direct sunlight). Heat will make itching worse.    An ice pack will relieve local areas of intense itching and redness. To make an ice pack, put ice cubes in a plastic bag that seals at the top. Wrap it in a thin, clean towel. Don t put the ice directly on the skin because it can damage the skin.    Oral diphenhydramine is an over-the-counter antihistamine sold at pharmacy and grocery stores. Unless a prescription antihistamine was given, diphenhydramine may be used to reduce itching if large areas of the skin are involved. It may make you sleepy. So be careful using it in the daytime or when going to school, working, or driving. Note: Don t use diphenhydramine if you have glaucoma or if you are a man with trouble urinating due to an enlarged prostate. There are other antihistamines that won t make you so sleepy. These are good choices for daytime use. Ask your pharmacist for suggestions.    Don t use diphenhydramine cream on your skin. It can cause a further reaction in some people.    To help prevent an infection, don't scratch the affected area. Scratching may worsen the reaction and damage your skin. It can also lead to an infection. Always check the affected for signs of an infection.    Call your healthcare provider and ask what you can use to help decrease the itching.    To decrease allergic reactions, try the following:      Use heat-steam to clean your home    Use high-efficiency particulate (HEPA) vacuums and filters    Stay away from food and pet triggers    Kill any cockroaches    Clean your house often  Follow-up care  Follow up with your healthcare provider, or as advised. If you had a severe reaction today, or if you have had several mild to medium allergic reactions in the  past, ask your provider about allergy testing. This can help you find out what you are allergic to. If your reaction included dizziness, fainting, or trouble breathing or swallowing, ask your provider about carrying auto-injectable epinephrine.  Call 911  Call 911 if any of these occur:    Trouble breathing or swallowing, wheezing    Cool, moist, pale skin    Shortness of breath    Hoarse voice or trouble speaking    Confused     Very drowsy or trouble awakening    Fainting or loss of consciousness    Rapid heart rate    Feeling of dizziness or weakness or a sudden drop in blood pressure    Feeling of doom    Feeling lightheaded    Severe nausea or vomiting, or diarrhea    Seizure    Swelling in the face, eyelids, lips, mouth, throat or tongue    Drooling  When to seek medical advice  Call your healthcare provider right away if any of these occur:    Spreading areas of itching, redness or swelling    Nausea or stomach cramps or abdominal pain    Continuing or recurring symptoms    Spreading areas of redness, swelling, or itching    Signs of infection at the affected site:    Spreading redness    Increased pain or swelling    Fluid or colored drainage from the site    Fever of 100.4 F (38 C) or above lasting for 24 to 48 hours, or as directed by your provider  Date Last Reviewed: 3/1/2017    3975-8164 [employer]. [address] [place] [address]. All rights reserved. This information is not intended as a substitute for professional medical care. Always follow your healthcare professional's instructions.                Follow-ups after your visit        Your next 10 appointments already scheduled     Nov 14, 2017 10:20 AM CST   PHYSICAL with Eliseo Benitez PA-C   [facility]    [address] [place] [address], [address]  [facility] [address] [phone]              Who to contact     If you have questions or need follow up information about  "today's clinic visit or your schedule please contact [facility] directly at [phone].  Normal or non-critical lab and imaging results will be communicated to you by [email or web address], letter or phone within 4 business days after the clinic has received the results. If you do not hear from us within 7 days, please contact the clinic through [email or web address] or phone. If you have a critical or abnormal lab result, we will notify you by phone as soon as possible.  Submit refill requests through [employer] or call your pharmacy and they will forward the refill request to us. Please allow 3 business days for your refill to be completed.          Additional Information About Your Visit        [email or web address] Information     [employer] gives you secure access to your electronic health record. If you see a primary care provider, you can also send messages to your care team and make appointments. If you have questions, please call your primary care clinic.  If you do not have a primary care provider, please call [phone] and they will assist you.        Care EveryWhere ID     This is your Care EveryWhere ID. This could be used by other organizations to access your [facility] medical records  [id number]        Your Vitals Were     Pulse Temperature Height Pulse Oximetry BMI (Body Mass Index)       87 98.2  F (36.8  C) (Oral) 5' 11\" (1.803 m) 97% 36.96 kg/m2        Blood Pressure from Last 3 Encounters:   11/11/17 122/60   11/08/17 116/70   06/02/17 118/65    Weight from Last 3 Encounters:   11/11/17 265 lb (120.2 kg)   11/08/17 265 lb 3.2 oz (120.3 kg)   06/02/17 280 lb (127 kg)              Today, you had the following     No orders found for display         Today's Medication Changes          These changes are accurate as of: 11/11/17  4:07 PM.  If you have any questions, ask your nurse or doctor.               Start taking these medicines.        Dose/Directions    clindamycin 300 MG capsule   Commonly known as:  CLEOCIN   Used " for:  Paronychia of great toe, right   Started by:  Barbara Calvin MD        Dose:  300 mg   Take 1 capsule (300 mg) by mouth 4 times daily for 10 days   Quantity:  40 capsule   Refills:  0            Where to get your medicines      These medications were sent to "[facility] - [facility] [address] AT [facility] [address]  [address], [facility] [address]     Phone:  [phone]     clindamycin 300 MG capsule                Primary Care Provider Office Phone # Fax #    Eliseo Tory Benitez PA-C [phone]  [facility] [address]        Equal Access to Services     MANUEL EARL : Hadii jae horner hadasho Soomaali, waaxda luqadaha, qaybta kaalmada adeegyada, waxay idiin haygermanian sajan [place]. So [facility] [phone].    ATENCIÓN: Si habla español, tiene a lafleur disposición servicios gratuitos de asistencia lingüística. [facility] [phone].    We comply with applicable federal civil rights laws and [place] laws. We do not discriminate on the basis of race, color, national origin, age, disability, sex, sexual orientation, or gender identity.            Thank you!     Thank you for choosing [facility]  for your care. Our goal is always to provide you with excellent care. Hearing back from our patients is one way we can continue to improve our services. Please take a few minutes to complete the written survey that you may receive in the mail after your visit with us. Thank you!             Your Updated Medication List - Protect others around you: Learn how to safely use, store and throw away your medicines at www.disposemymeds.org.          This list is accurate as of: 11/11/17  4:07 PM.  Always use your most recent med list.                   Brand Name Dispense Instructions for use Diagnosis    clindamycin 300 MG capsule    CLEOCIN    40 capsule    Take 1 capsule (300 mg) by mouth 4 times daily for 10 days     Paronychia of great toe, right       QUEtiapine 25 MG tablet    SEROquel    180 tablet    TAKE 1 TO 2 TABLETS BY MOUTH EVERY NIGHT AT BEDTIME AS NEEDED FOR INSOMNIA.    Shift work sleep disorder       sulfamethoxazole-trimethoprim 800-160 MG per tablet    BACTRIM DS/SEPTRA DS    20 tablet    Take 1 tablet by mouth 2 times daily    Paronychia of great toe, right       VENTOLIN  (90 BASE) MCG/ACT Inhaler   Generic drug:  albuterol

## 2017-11-11 NOTE — PROGRESS NOTES
SUBJECTIVE:  Chief Complaint   Patient presents with     Allergic Reaction     hives onset on thighs yesterday, taking bactrim - denies shortness of breath      Urgent Care       Harrison Khan is a 32 year old male who presents to the clinic today for a rash.  Patient has concern that this rash is an allergic reaction to a medication that has recently been taken - Bactrim for  2 days  Onset of rash was 1 day(s) ago.   Rash is sudden onset, still present and worsening.  Location of the rash: lower abdomen and left thigh.  Quality/symptoms of rash: itching, burning and redness   Symptoms are moderate and rash seems to be worsening.  Previous history of a similar rash? Yes: similar rash with past allergic reaction to medications  Recent other exposure history: none known    Associated symptoms include: nothing.  Patient denies: fever, throat tightness, wheezing, cough, tongue/lip swelling, shortness of breath, sore throat and URI symptoms.    Past Medical History:   Diagnosis Date     Allergic rhinitis, cause unspecified      Attention deficit disorder without mention of hyperactivity      Chronic insomnia 3/3/2014     Generalised anxiety disorder 7/6/2012     Migraine     Occasional hemiplegic characteristics     Obsessive-compulsive disorders     sari high       ALLERGIES:  Wellbutrin [bupropion]; Bactrim [sulfamethoxazole w/trimethoprim]; and Penicillins      Current Outpatient Prescriptions on File Prior to Visit:  sulfamethoxazole-trimethoprim (BACTRIM DS/SEPTRA DS) 800-160 MG per tablet Take 1 tablet by mouth 2 times daily   QUEtiapine (SEROQUEL) 25 MG tablet TAKE 1 TO 2 TABLETS BY MOUTH EVERY NIGHT AT BEDTIME AS NEEDED FOR INSOMNIA.   VENTOLIN  (90 BASE) MCG/ACT inhaler      No current facility-administered medications on file prior to visit.     Social History   Substance Use Topics     Smoking status: Former Smoker     Types: Cigarettes     Smokeless tobacco: Current User     Types: Chew      "Alcohol use 0.0 oz/week     0 Standard drinks or equivalent per week      Comment: occasional       Family History   Problem Relation Age of Onset     Hypertension Mother      Hypertension Father      Cardiovascular Maternal Grandmother      Cardiovascular Maternal Grandfather      Cardiovascular Paternal Grandmother      Cardiovascular Paternal Grandfather          ROS:  CONSTITUTIONAL:NEGATIVE for fever, chills, change in weight  EYES: NEGATIVE for vision changes or irritation  ENT/MOUTH: NEGATIVE for ear, mouth and throat problems  RESP:NEGATIVE for significant cough or SOB  GI: NEGATIVE for nausea, abdominal pain, heartburn, or change in bowel habits    EXAM:   /60 (BP Location: Right arm, Patient Position: Chair, Cuff Size: Adult Large)  Pulse 87  Temp 98.2  F (36.8  C) (Oral)  Ht 5' 11\" (1.803 m)  Wt 265 lb (120.2 kg)  SpO2 97%  BMI 36.96 kg/m2  GENERAL: alert, no acute distress.  SKIN: Rash description:    Distribution: localized  Location: lower abdomen left groin, left thigh    Color: red,  Lesion type: maculopapular, clustered/  scattered discrete lesions with warmth, swelling and inflammation  GENERAL APPEARANCE: alert, mild distress and cooperative  EYES: EOMI,  PERRL, conjunctiva clear  NECK: supple, non-tender to palpation, no adenopathy noted  RESP: lungs clear to auscultation - no rales, rhonchi or wheezes  CV: regular rates and rhythm, normal S1 S2, no murmur noted    ASSESSMENT:  Paronychia of great toe, right     - clindamycin (CLEOCIN) 300 MG capsule; Take 1 capsule (300 mg) by mouth 4 times daily for 10 days       Allergic reaction to drug, initial encounter     We discussed that the current rash could be due to an allergic reaction to bactrim  Recommend stopping the treatment with bactrim and starting clindamycin for his paronychia       Triamcinolone cream, apply bid for itching, inflammation -he declined prescription    Will use OTC benadryl for itching and rash      Follow-up " with primary clinic if not improving  Return  Or to ER of mouth/ tongue swelling, SOB/ wheezing

## 2017-11-13 NOTE — PROGRESS NOTES
SUBJECTIVE:   CC: Harrison Khan is an 32 year old male who presents for preventative health visit.     Physical   Annual:     Getting at least 3 servings of Calcium per day::  Yes    Bi-annual eye exam::  Yes    Dental care twice a year::  Yes    Sleep apnea or symptoms of sleep apnea::  None    Diet::  Regular (no restrictions)    Frequency of exercise::  4-5 days/week    Duration of exercise::  45-60 minutes    Taking medications regularly::  Yes    Medication side effects::  None    Additional concerns today::  No      Patient here for physical.  No major concerns today.  Would like to do fasting labs.  Just got a refill of seroquel, doing well on this.  Has been dieting and exercising and lost about 25lbs so far.  Hoping to still lose more.    Wt Readings from Last 4 Encounters:   11/14/17 255 lb (115.7 kg)   11/11/17 265 lb (120.2 kg)   11/08/17 265 lb 3.2 oz (120.3 kg)   06/02/17 280 lb (127 kg)         Today's PHQ-2 Score:   PHQ-2 ( 1999 Pfizer) 11/14/2017   Q1: Little interest or pleasure in doing things 0   Q2: Feeling down, depressed or hopeless 0   PHQ-2 Score 0   Q1: Little interest or pleasure in doing things Not at all   Q2: Feeling down, depressed or hopeless Not at all   PHQ-2 Score 0       Abuse: Current or Past(Physical, Sexual or Emotional)- No  Do you feel safe in your environment - Yes    Social History   Substance Use Topics     Smoking status: Former Smoker     Types: Cigarettes     Smokeless tobacco: Current User     Types: Chew     Alcohol use 0.0 oz/week     0 Standard drinks or equivalent per week      Comment: occasional     The patient does not drink >3 drinks per day nor >7 drinks per week.    Last PSA: No results found for: PSA    Reviewed orders with patient. Reviewed health maintenance and updated orders accordingly - Yes  Labs reviewed in EPIC    Reviewed and updated as needed this visit by clinical staff  Tobacco  Allergies  Meds  Problems  Med Hx  Surg Hx  Fam Hx  Soc  Hx          Reviewed and updated as needed this visit by Provider         Review of Systems  C: NEGATIVE for fever, chills, change in weight  I: NEGATIVE for worrisome rashes, moles or lesions  E: NEGATIVE for vision changes or irritation  ENT: NEGATIVE for ear, mouth and throat problems  R: NEGATIVE for significant cough or SOB  CV: NEGATIVE for chest pain, palpitations or peripheral edema  GI: NEGATIVE for nausea, abdominal pain, heartburn, or change in bowel habits   male: negative for dysuria, hematuria, decreased urinary stream, erectile dysfunction, urethral discharge  M: NEGATIVE for significant arthralgias or myalgia  N: NEGATIVE for weakness, dizziness or paresthesias  P: NEGATIVE for changes in mood or affect    OBJECTIVE:   /58 (BP Location: Right arm, Patient Position: Sitting, Cuff Size: Adult Large)  Pulse 72  Temp 97.7  F (36.5  C) (Oral)  Resp 16  Wt 255 lb (115.7 kg)  BMI 35.57 kg/m2    Physical Exam  GENERAL: healthy, alert and no distress  EYES: Eyes grossly normal to inspection, PERRL and conjunctivae and sclerae normal  HENT: ear canals and TM's normal, nose and mouth without ulcers or lesions  NECK: no adenopathy, no asymmetry, masses, or scars and thyroid normal to palpation  RESP: lungs clear to auscultation - no rales, rhonchi or wheezes  CV: regular rate and rhythm, normal S1 S2, no S3 or S4, no murmur, click or rub, no peripheral edema and peripheral pulses strong  ABDOMEN: soft, nontender, no hepatosplenomegaly, no masses and bowel sounds normal  MS: no gross musculoskeletal defects noted, no edema  SKIN: no suspicious lesions or rashes  NEURO: Normal strength and tone, mentation intact and speech normal  PSYCH: mentation appears normal, affect normal/bright    ASSESSMENT/PLAN:   1. Routine general medical examination at a health care facility    - Lipid panel reflex to direct LDL Fasting  - CBC with platelets    2. Morbid obesity (H)  - doing well, he will continue to diet  "and exercise.  He stopped phentermine, did not like side effects of feeling anxious.    3. Chronic insomnia  - will call for refills of seroquel when needed.    4. Need for prophylactic vaccination and inoculation against influenza    - FLU VAC, SPLIT VIRUS IM > 3 YO (QUADRIVALENT) [42693]  - Vaccine Administration, Initial [00500]      COUNSELING:   Reviewed preventive health counseling, as reflected in patient instructions     reports that he has quit smoking. His smoking use included Cigarettes. His smokeless tobacco use includes Chew.  Tobacco Cessation Action Plan: Information offered: Patient not interested at this time  Estimated body mass index is 35.57 kg/(m^2) as calculated from the following:    Height as of 11/11/17: 5' 11\" (1.803 m).    Weight as of this encounter: 255 lb (115.7 kg).   Weight management plan: Discussed healthy diet and exercise guidelines and patient will follow up in 12 months in clinic to re-evaluate.    Counseling Resources:  ATP IV Guidelines  Pooled Cohorts Equation Calculator  FRAX Risk Assessment  ICSI Preventive Guidelines  Dietary Guidelines for Americans, 2010  USDA's MyPlate  ASA Prophylaxis  Lung CA Screening    Eliseo Benitez PA-C  [facility]  "

## 2017-11-14 ENCOUNTER — OFFICE VISIT (OUTPATIENT)
Dept: FAMILY MEDICINE | Facility: CLINIC | Age: 32
End: 2017-11-14
Payer: COMMERCIAL

## 2017-11-14 VITALS
RESPIRATION RATE: 16 BRPM | SYSTOLIC BLOOD PRESSURE: 118 MMHG | TEMPERATURE: 97.7 F | WEIGHT: 255 LBS | HEART RATE: 72 BPM | BODY MASS INDEX: 35.57 KG/M2 | DIASTOLIC BLOOD PRESSURE: 58 MMHG

## 2017-11-14 DIAGNOSIS — E66.01 MORBID OBESITY (H): ICD-10-CM

## 2017-11-14 DIAGNOSIS — F51.04 CHRONIC INSOMNIA: ICD-10-CM

## 2017-11-14 DIAGNOSIS — Z00.00 ROUTINE GENERAL MEDICAL EXAMINATION AT A HEALTH CARE FACILITY: Primary | ICD-10-CM

## 2017-11-14 DIAGNOSIS — Z23 NEED FOR PROPHYLACTIC VACCINATION AND INOCULATION AGAINST INFLUENZA: ICD-10-CM

## 2017-11-14 LAB
ERYTHROCYTE [DISTWIDTH] IN BLOOD BY AUTOMATED COUNT: 12.2 % (ref 10–15)
HCT VFR BLD AUTO: 46.4 % (ref 40–53)
HGB BLD-MCNC: 16.1 G/DL (ref 13.3–17.7)
MCH RBC QN AUTO: 31.6 PG (ref 26.5–33)
MCHC RBC AUTO-ENTMCNC: 34.7 G/DL (ref 31.5–36.5)
MCV RBC AUTO: 91 FL (ref 78–100)
PLATELET # BLD AUTO: 291 10E9/L (ref 150–450)
RBC # BLD AUTO: 5.09 10E12/L (ref 4.4–5.9)
WBC # BLD AUTO: 7.3 10E9/L (ref 4–11)

## 2017-11-14 PROCEDURE — 90686 IIV4 VACC NO PRSV 0.5 ML IM: CPT | Performed by: PHYSICIAN ASSISTANT

## 2017-11-14 PROCEDURE — 80061 LIPID PANEL: CPT | Performed by: PHYSICIAN ASSISTANT

## 2017-11-14 PROCEDURE — 90471 IMMUNIZATION ADMIN: CPT | Performed by: PHYSICIAN ASSISTANT

## 2017-11-14 PROCEDURE — 36415 COLL VENOUS BLD VENIPUNCTURE: CPT | Performed by: PHYSICIAN ASSISTANT

## 2017-11-14 PROCEDURE — 99395 PREV VISIT EST AGE 18-39: CPT | Mod: 25 | Performed by: PHYSICIAN ASSISTANT

## 2017-11-14 PROCEDURE — 85027 COMPLETE CBC AUTOMATED: CPT | Performed by: PHYSICIAN ASSISTANT

## 2017-11-14 ASSESSMENT — ANXIETY QUESTIONNAIRES
1. FEELING NERVOUS, ANXIOUS, OR ON EDGE: NOT AT ALL
3. WORRYING TOO MUCH ABOUT DIFFERENT THINGS: NOT AT ALL
2. NOT BEING ABLE TO STOP OR CONTROL WORRYING: NOT AT ALL
5. BEING SO RESTLESS THAT IT IS HARD TO SIT STILL: NOT AT ALL
7. FEELING AFRAID AS IF SOMETHING AWFUL MIGHT HAPPEN: NOT AT ALL
GAD7 TOTAL SCORE: 0
6. BECOMING EASILY ANNOYED OR IRRITABLE: NOT AT ALL

## 2017-11-14 ASSESSMENT — PATIENT HEALTH QUESTIONNAIRE - PHQ9
SUM OF ALL RESPONSES TO PHQ QUESTIONS 1-9: 0
5. POOR APPETITE OR OVEREATING: NOT AT ALL

## 2017-11-14 NOTE — MR AVS SNAPSHOT
After Visit Summary   11/14/2017    Harrison Khan    MRN: [id number]           Patient Information     Date Of Birth          1985        Visit Information        Provider Department      11/14/2017 10:20 AM Eliseo Benitez PA-C [facility]        Today's Diagnoses     Routine general medical examination at a health care facility    -  1    Chronic insomnia          Care Instructions      Preventive Health Recommendations  Male Ages 26 - 39    Yearly exam:             See your health care provider every year in order to  o   Review health changes.   o   Discuss preventive care.    o   Review your medicines if your doctor has prescribed any.    You should be tested each year for STDs (sexually transmitted diseases), if you re at risk.     After age 35, talk to your provider about cholesterol testing. If you are at risk for heart disease, have your cholesterol tested at least every 5 years.     If you are at risk for diabetes, you should have a diabetes test (fasting glucose).  Shots: Get a flu shot each year. Get a tetanus shot every 10 years.     Nutrition:    Eat at least 5 servings of fruits and vegetables daily.     Eat whole-grain bread, whole-wheat pasta and brown rice instead of white grains and rice.     Talk to your provider about Calcium and Vitamin D.     Lifestyle    Exercise for at least 150 minutes a week (30 minutes a day, 5 days a week). This will help you control your weight and prevent disease.     Limit alcohol to one drink per day.     No smoking.     Wear sunscreen to prevent skin cancer.     See your dentist every six months for an exam and cleaning.             Follow-ups after your visit        Follow-up notes from your care team     Return in about 1 year (around 11/14/2018) for Physical Exam.      Who to contact     If you have questions or need follow up information about today's clinic visit or your schedule please contact [facility]  [place] directly at [phone].  Normal or non-critical lab and imaging results will be communicated to you by MyChart, letter or phone within 4 business days after the clinic has received the results. If you do not hear from us within 7 days, please contact the clinic through Rosalind[facility] or phone. If you have a critical or abnormal lab result, we will notify you by phone as soon as possible.  Submit refill requests through atokore or call your pharmacy and they will forward the refill request to us. Please allow 3 business days for your refill to be completed.          Additional Information About Your Visit        [facility] Information     atokore gives you secure access to your electronic health record. If you see a primary care provider, you can also send messages to your care team and make appointments. If you have questions, please call your primary care clinic.  If you do not have a primary care provider, please call [phone] and they will assist you.        Care EveryWhere ID     This is your Care EveryWhere ID. This could be used by other organizations to access your [place] medical records  [id number]        Your Vitals Were     Pulse Temperature Respirations BMI (Body Mass Index)          72 97.7  F (36.5  C) (Oral) 16 35.57 kg/m2         Blood Pressure from Last 3 Encounters:   11/14/17 118/58   11/11/17 122/60   11/08/17 116/70    Weight from Last 3 Encounters:   11/14/17 255 lb (115.7 kg)   11/11/17 265 lb (120.2 kg)   11/08/17 265 lb 3.2 oz (120.3 kg)              We Performed the Following     CBC with platelets     Lipid panel reflex to direct LDL Fasting        Primary Care Provider Office Phone # Fax #    Eliseo Benitez PA-C [phone]       [id number]  [facility] [address]        Equal Access to Services     MANUEL EARL : Azra Alicea, waandresda luqadaha, qaybta kaalmada nataliya, parmjit [place]. So [facility]  [phone].    ATENCIÓN: Si manolo flynn, tiene a lafleur disposición servicios gratuitos de asistencia lingüística. Pearl carrion [phone].    We comply with applicable federal civil rights laws and [place] laws. We do not discriminate on the basis of race, color, national origin, age, disability, sex, sexual orientation, or gender identity.            Thank you!     Thank you for choosing [facility]  for your care. Our goal is always to provide you with excellent care. Hearing back from our patients is one way we can continue to improve our services. Please take a few minutes to complete the written survey that you may receive in the mail after your visit with us. Thank you!             Your Updated Medication List - Protect others around you: Learn how to safely use, store and throw away your medicines at www.disposemymeds.org.          This list is accurate as of: 11/14/17 10:57 AM.  Always use your most recent med list.                   Brand Name Dispense Instructions for use Diagnosis    clindamycin 300 MG capsule    CLEOCIN    40 capsule    Take 1 capsule (300 mg) by mouth 4 times daily for 10 days    Paronychia of great toe, right       QUEtiapine 25 MG tablet    SEROquel    180 tablet    TAKE 1 TO 2 TABLETS BY MOUTH EVERY NIGHT AT BEDTIME AS NEEDED FOR INSOMNIA.    Shift work sleep disorder       VENTOLIN  (90 BASE) MCG/ACT Inhaler   Generic drug:  albuterol

## 2017-11-14 NOTE — PROGRESS NOTES

## 2017-11-15 LAB
CHOLEST SERPL-MCNC: 223 MG/DL
HDLC SERPL-MCNC: 45 MG/DL
LDLC SERPL CALC-MCNC: 148 MG/DL
NONHDLC SERPL-MCNC: 178 MG/DL
TRIGL SERPL-MCNC: 151 MG/DL

## 2017-11-15 ASSESSMENT — ANXIETY QUESTIONNAIRES: GAD7 TOTAL SCORE: 0

## 2018-02-05 DIAGNOSIS — G47.26 SHIFT WORK SLEEP DISORDER: ICD-10-CM

## 2018-02-06 NOTE — TELEPHONE ENCOUNTER
Routing refill request to provider for review/approval because:  Labs out of range:  Lipids    Faith Bates RN, BS  Clinical Nurse Triage.

## 2018-02-06 NOTE — TELEPHONE ENCOUNTER
"Requested Prescriptions   Pending Prescriptions Disp Refills     QUEtiapine (SEROQUEL) 25 MG tablet [Pharmacy Med Name: QUETIAPINE 25MG TABLETS] 180 tablet 0    Last Written Prescription Date:  10/26/17  Last Fill Quantity: 180,  # refills: 0   Last Office Visit: 11/14/2017   Future Office Visit:      Sig: TAKE 1 TO 2 TABLETS BY MOUTH EVERY NIGHT AT BEDTIME AS NEEDED FOR INSOMNIA.    Antipsychotic Medications Failed    2/5/2018  6:44 PM       Failed - Lipid panel on file within the past 12 months    Recent Labs   Lab Test  11/14/17   1108   CHOL  223*   TRIG  151*   HDL  45   LDL  148*          Passed - BP is less then 140/90    BP Readings from Last 3 Encounters:   11/14/17 118/58   11/11/17 122/60   11/08/17 116/70          Passed - Patient is 12 years of age or older       Passed - CBC on file in past 12 months    Recent Labs   Lab Test  11/14/17   1108   WBC  7.3   RBC  5.09   HGB  16.1   HCT  46.4   PLT  291          Passed - Heart Rate on file within past 12 months    Pulse Readings from Last 3 Encounters:   11/14/17 72   11/11/17 87   11/08/17 73          Passed - A1c or Glucose on file in past 12 months    Recent Labs   Lab Test  06/02/17   1607   GLC  95   A1C  4.9     Please review patients last 3 weights. If a weight gain of >10 lbs exists, you may refill the prescription once after instructing the patient to schedule an appointment within the next 30 days.    Wt Readings from Last 3 Encounters:   11/14/17 255 lb (115.7 kg)   11/11/17 265 lb (120.2 kg)   11/08/17 265 lb 3.2 oz (120.3 kg)          Passed - Recent or future visit with authorizing provider's specialty    Patient had office visit in the last 6 months or has a visit in the next 30 days with authorizing provider.  See \"Patient Info\" tab in inbasket, or \"Choose Columns\" in Meds & Orders section of the refill encounter.              "

## 2018-02-08 RX ORDER — QUETIAPINE FUMARATE 25 MG/1
TABLET, FILM COATED ORAL
Qty: 180 TABLET | Refills: 1 | Status: SHIPPED | OUTPATIENT
Start: 2018-02-08 | End: 2018-10-10

## 2018-02-08 NOTE — TELEPHONE ENCOUNTER
Patient is leaving to go out of town tomorrow at 5, he is hoping he can  the Rx before then. Lolita Bhatia

## 2018-04-03 ENCOUNTER — TELEPHONE (OUTPATIENT)
Dept: FAMILY MEDICINE | Facility: CLINIC | Age: 33
End: 2018-04-03

## 2018-04-03 DIAGNOSIS — Z72.0 TOBACCO ABUSE: Primary | ICD-10-CM

## 2018-04-03 NOTE — TELEPHONE ENCOUNTER
Pt requesting new RX for Nicoderm Patches to help him quit chewing.  Please call to [facility] on [address].    Not currently on med list.    Pt can be reached at [phone] for any questions.

## 2018-04-03 NOTE — TELEPHONE ENCOUNTER
Disp Refills Start End CRISTINA   nicotine (NICODERM CQ) 7 MG/24HR 24 hr patch (Discontinued) 30 patch 1 6/2/2017 11/8/2017 --   Sig: Place 1 patch onto the skin every 24 hours     On 6.2.17 rx sent for the 21.14. & 7 mg patches    Would like new scripts    Route to provider to review and advise    Faith Bates RN Nurse Triage

## 2018-04-05 RX ORDER — NICOTINE 21 MG/24HR
1 PATCH, TRANSDERMAL 24 HOURS TRANSDERMAL EVERY 24 HOURS
Qty: 30 PATCH | Refills: 0 | Status: SHIPPED | OUTPATIENT
Start: 2018-04-05 | End: 2018-10-16

## 2018-04-06 ENCOUNTER — TELEPHONE (OUTPATIENT)
Dept: FAMILY MEDICINE | Facility: CLINIC | Age: 33
End: 2018-04-06

## 2018-04-06 DIAGNOSIS — Z72.0 TOBACCO ABUSE DISORDER: Primary | ICD-10-CM

## 2018-04-06 NOTE — TELEPHONE ENCOUNTER
Pt calling stating he requested the nicotine gym not the patches.     Please advise on nicotine gum

## 2018-04-06 NOTE — TELEPHONE ENCOUNTER
Oh come on..... This is what I got!  I only did what I was told :-)  I'll do the gum now.  Don't know the dose he wants however so I'm just choosing the 4mg.      Note        Disp Refills Start End CRISTINA   nicotine (NICODERM CQ) 7 MG/24HR 24 hr patch (Discontinued) 30 patch 1 6/2/2017 11/8/2017 --   Sig: Place 1 patch onto the skin every 24 hours      On 6.2.17 rx sent for the 21.14. & 7 mg patches     Would like new scripts     Route to provider to review and advise     Faith Bates RN Nurse Triage

## 2018-10-10 DIAGNOSIS — G47.26 SHIFT WORK SLEEP DISORDER: ICD-10-CM

## 2018-10-10 NOTE — TELEPHONE ENCOUNTER
"Requested Prescriptions   Pending Prescriptions Disp Refills     QUEtiapine (SEROQUEL) 25 MG tablet [Pharmacy Med Name: QUETIAPINE 25MG TABLETS] 180 tablet 0    Last Written Prescription Date:  2/8/18  Last Fill Quantity: 180,  # refills: 1   Last Office Visit: 11/14/2017   Future Office Visit:      Sig: TAKE 1 TO 2 TABLETS BY MOUTH EVERY NIGHT AT BEDTIME AS NEEDED FOR INSOMNIA.    Antipsychotic Medications Failed    10/10/2018  3:42 AM       Failed - A1c or Glucose on file in past 12 months    Recent Labs   Lab Test  06/02/17   1607   GLC  95   A1C  4.9       Please review patients last 3 weights. If a weight gain of >10 lbs exists, you may refill the prescription once after instructing the patient to schedule an appointment within the next 30 days.    Wt Readings from Last 3 Encounters:   11/14/17 255 lb (115.7 kg)   11/11/17 265 lb (120.2 kg)   11/08/17 265 lb 3.2 oz (120.3 kg)            Failed - Recent (6 mo) or future (30 days) visit within the authorizing provider's specialty    Patient had office visit in the last 6 months or has a visit in the next 30 days with authorizing provider or within the authorizing provider's specialty.  See \"Patient Info\" tab in inbasket, or \"Choose Columns\" in Meds & Orders section of the refill encounter.           Passed - Blood pressure under 140/90 in past 12 months    BP Readings from Last 3 Encounters:   11/14/17 118/58   11/11/17 122/60   11/08/17 116/70                Passed - Patient is 12 years of age or older       Passed - Lipid panel on file within the past 12 months    Recent Labs   Lab Test  11/14/17   1108   CHOL  223*   TRIG  151*   HDL  45   LDL  148*   NHDL  178*              Passed - CBC on file in past 12 months    Recent Labs   Lab Test  11/14/17   1108   WBC  7.3   RBC  5.09   HGB  16.1   HCT  46.4   PLT  291       For [facility] ONLY: [id number] = WBC, [id number] = RBC         Passed - Heart Rate on file within past 12 months    Pulse Readings from Last 3 Encounters: "   11/14/17 72   11/11/17 87   11/08/17 73

## 2018-10-11 RX ORDER — QUETIAPINE FUMARATE 25 MG/1
TABLET, FILM COATED ORAL
Qty: 180 TABLET | Refills: 0 | Status: SHIPPED | OUTPATIENT
Start: 2018-10-11 | End: 2019-03-08

## 2018-10-11 NOTE — TELEPHONE ENCOUNTER
Please tell him that apparently this medication requires yearly labs.  Has been almost a year since last physical.  See if he will schedule to come in.  I'll fill some for him now though.    Thanks,  Eliseo

## 2018-10-11 NOTE — TELEPHONE ENCOUNTER
Routing refill request to provider for review/approval because:  Labs not current:  Needs yearly glucose.  > 6 months since last Office visit.  Concha Vital RN

## 2018-10-16 ENCOUNTER — OFFICE VISIT (OUTPATIENT)
Dept: FAMILY MEDICINE | Facility: CLINIC | Age: 33
End: 2018-10-16
Payer: COMMERCIAL

## 2018-10-16 VITALS
WEIGHT: 269 LBS | HEART RATE: 74 BPM | RESPIRATION RATE: 16 BRPM | SYSTOLIC BLOOD PRESSURE: 122 MMHG | BODY MASS INDEX: 37.66 KG/M2 | DIASTOLIC BLOOD PRESSURE: 80 MMHG | HEIGHT: 71 IN | TEMPERATURE: 98.9 F

## 2018-10-16 DIAGNOSIS — F51.04 CHRONIC INSOMNIA: ICD-10-CM

## 2018-10-16 DIAGNOSIS — Z00.01 ENCOUNTER FOR ROUTINE ADULT MEDICAL EXAM WITH ABNORMAL FINDINGS: Primary | ICD-10-CM

## 2018-10-16 DIAGNOSIS — Z23 NEED FOR PROPHYLACTIC VACCINATION AND INOCULATION AGAINST INFLUENZA: ICD-10-CM

## 2018-10-16 DIAGNOSIS — Z72.0 TOBACCO ABUSE DISORDER: ICD-10-CM

## 2018-10-16 PROCEDURE — 99395 PREV VISIT EST AGE 18-39: CPT | Mod: 25 | Performed by: PHYSICIAN ASSISTANT

## 2018-10-16 PROCEDURE — 90471 IMMUNIZATION ADMIN: CPT | Performed by: PHYSICIAN ASSISTANT

## 2018-10-16 PROCEDURE — 90686 IIV4 VACC NO PRSV 0.5 ML IM: CPT | Performed by: PHYSICIAN ASSISTANT

## 2018-10-16 ASSESSMENT — ANXIETY QUESTIONNAIRES
2. NOT BEING ABLE TO STOP OR CONTROL WORRYING: NOT AT ALL
7. FEELING AFRAID AS IF SOMETHING AWFUL MIGHT HAPPEN: NOT AT ALL
6. BECOMING EASILY ANNOYED OR IRRITABLE: NOT AT ALL
3. WORRYING TOO MUCH ABOUT DIFFERENT THINGS: NOT AT ALL
1. FEELING NERVOUS, ANXIOUS, OR ON EDGE: NOT AT ALL
GAD7 TOTAL SCORE: 0
5. BEING SO RESTLESS THAT IT IS HARD TO SIT STILL: NOT AT ALL

## 2018-10-16 ASSESSMENT — PATIENT HEALTH QUESTIONNAIRE - PHQ9: 5. POOR APPETITE OR OVEREATING: NOT AT ALL

## 2018-10-16 NOTE — PATIENT INSTRUCTIONS
Preventive Health Recommendations  Male Ages 26 - 39    Yearly exam:             See your health care provider every year in order to  o   Review health changes.   o   Discuss preventive care.    o   Review your medicines if your doctor has prescribed any.    You should be tested each year for STDs (sexually transmitted diseases), if you re at risk.     After age 35, talk to your provider about cholesterol testing. If you are at risk for heart disease, have your cholesterol tested at least every 5 years.     If you are at risk for diabetes, you should have a diabetes test (fasting glucose).  Shots: Get a flu shot each year. Get a tetanus shot every 10 years.     Nutrition:    Eat at least 5 servings of fruits and vegetables daily.     Eat whole-grain bread, whole-wheat pasta and brown rice instead of white grains and rice.     Get adequate Calcium and Vitamin D.     Lifestyle    Exercise for at least 150 minutes a week (30 minutes a day, 5 days a week). This will help you control your weight and prevent disease.     Limit alcohol to one drink per day.     No smoking.     Wear sunscreen to prevent skin cancer.     See your dentist every six months for an exam and cleaning.     5:2 diet  DASH diet  Eating Heart-Healthy Food: Using the DASH Plan    Eating for your heart doesn t have to be hard or boring. You just need to know how to make healthier choices. The DASH eating plan has been developed to help you do just that. DASH stands for Dietary Approaches to Stop Hypertension. It is a plan that has been proven to be healthier for your heart and to lower your risk for high blood pressure. It can also help lower your risk for cancer, heart disease, osteoporosis, and diabetes.  Choosing from each food group  Choose foods from each of the food groups below each day. Try to get the recommended number of servings for each food group. The serving numbers are based on a diet of 2,000 calories a day. Talk to your doctor if  you re unsure about your calorie needs. Along with getting the correct servings, the DASH plan also recommends a sodium intake less than 2,300 mg per day.        Grains  Servings: 6 to 8 a day  A serving is:    1 slice bread    1 ounce dry cereal    Half a cup cooked rice, pasta or cereal  Best choices: Whole grains and any grains high in fiber. Vegetables  Servings: 4 to 5 a day  A serving is:    1 cup raw leafy vegetable    Half a cup cut-up raw or cooked vegetable    Half a cup vegetable juice  Best choices: Fresh or frozen vegetables prepared without added salt or fat.   Fruits  Servings: 4 to 5 a day  A serving is:    1 medium fruit    One-quarter cup dried fruit    Half a cup fresh, frozen, or canned fruit    Half a cup of 100% fruit juices  Best choices: A variety of fresh fruits of different colors. Whole fruits are a better choice than fruit juices. Low-fat or fat-free dairy  Servings: 2 to 3 a day  A serving is:    1 cup milk    1 cup yogurt    One and a half ounces cheese  Best choices: Skim or 1% milk, low-fat or fat-free yogurt or buttermilk, and low-fat cheeses.         Lean meats, poultry, fish  Servings: 6 or fewer a day  A serving is:    1 ounce cooked meats, poultry, or fish    1 egg  Best choices: Lean poultry and fish. Trim away visible fat. Broil, grill, roast, or boil instead of frying. Remove skin from poultry before eating. Limit how much red meat you eat.  Nuts, seeds, beans  Servings: 4 to 5 a week  A serving is:    One-third cup nuts (one and a half ounces)    2 tablespoons nut butter or seeds    Half a cup cooked dry beans or legumes  Best choices: Dry roasted nuts with no salt added, lentils, kidney beans, garbanzo beans, and whole baires beans.   Fats and oils  Servings: 2 to 3 a day  A serving is:    1 teaspoon vegetable oil    1 teaspoon soft margarine    1 tablespoon mayonnaise    2 tablespoons salad dressing  Best choices: Nut and vegetable oils (nontropical vegetable oils), such as  olive and canola oil. Sweets  Servings: 5 a week or fewer  A serving is:    1 tablespoon sugar, maple syrup, or honey    1 tablespoon jam or jelly    1 half-ounce jelly beans (about 15)    1 cup lemonade  Best choices: Dried fruit can be a satisfying sweet. Choose low-fat sweets. And watch your serving sizes!      For more on the DASH eating plan, visit:  www.nhlbi.nih.gov/health/health-topics/topics/dash   Date Last Reviewed: 6/1/2016 2000-2017 [employer] [address] [place], [place] [address]. All rights reserved. This information is not intended as a substitute for professional medical care. Always follow your healthcare professional's instructions.

## 2018-10-16 NOTE — LETTER
My Depression Action Plan  Name: Harrison Khan   Date of Birth 1985  Date: 10/16/2018    My doctor: Eliseo Benitez   My clinic: [address], [address]  [facility] [address] [phone]          GREEN    ZONE   Good Control    What it looks like:     Things are going generally well. You have normal up s and down s. You may even feel depressed from time to time, but bad moods usually last less than a day.   What you need to do:  1. Continue to care for yourself (see self care plan)  2. Check your depression survival kit and update it as needed  3. Follow your physician s recommendations including any medication.  4. Do not stop taking medication unless you consult with your physician first.           YELLOW         ZONE Getting Worse    What it looks like:     Depression is starting to interfere with your life.     It may be hard to get out of bed; you may be starting to isolate yourself from others.    Symptoms of depression are starting to last most all day and this has happened for several days.     You may have suicidal thoughts but they are not constant.   What you need to do:     1. Call your care team, your response to treatment will improve if you keep your care team informed of your progress. Yellow periods are signs an adjustment may need to be made.     2. Continue your self-care, even if you have to fake it!    3. Talk to someone in your support network    4. Open up your depression survival kit           RED    ZONE Medical Alert - Get Help    What it looks like:     Depression is seriously interfering with your life.     You may experience these or other symptoms: You can t get out of bed most days, can t work or engage in other necessary activities, you have trouble taking care of basic hygiene, or basic responsibilities, thoughts of suicide or death that will not go away, self-injurious behavior.     What you need to  do:  1. Call your care team and request a same-day appointment. If they are not available (weekends or after hours) call your local crisis line, emergency room or 911.            Depression Self Care Plan / Survival Kit    Self-Care for Depression  Here s the deal. Your body and mind are really not as separate as most people think.  What you do and think affects how you feel and how you feel influences what you do and think. This means if you do things that people who feel good do, it will help you feel better.  Sometimes this is all it takes.  There is also a place for medication and therapy depending on how severe your depression is, so be sure to consult with your medical provider and/ or Behavioral Health Consultant if your symptoms are worsening or not improving.     In order to better manage my stress, I will:    Exercise  Get some form of exercise, every day. This will help reduce pain and release endorphins, the  feel good  chemicals in your brain. This is almost as good as taking antidepressants!  This is not the same as joining a gym and then never going! (they count on that by the way ) It can be as simple as just going for a walk or doing some gardening, anything that will get you moving.      Hygiene   Maintain good hygiene (Get out of bed in the morning, Make your bed, Brush your teeth, Take a shower, and Get dressed like you were going to work, even if you are unemployed).  If your clothes don't fit try to get ones that do.    Diet  I will strive to eat foods that are good for me, drink plenty of water, and avoid excessive sugar, caffeine, alcohol, and other mood-altering substances.  Some foods that are helpful in depression are: complex carbohydrates, B vitamins, flaxseed, fish or fish oil, fresh fruits and vegetables.    Psychotherapy  I agree to participate in Individual Therapy (if recommended).    Medication  If prescribed medications, I agree to take them.  Missing doses can result in serious  side effects.  I understand that drinking alcohol, or other illicit drug use, may cause potential side effects.  I will not stop my medication abruptly without first discussing it with my provider.    Staying Connected With Others  I will stay in touch with my friends, family members, and my primary care provider/team.    Use your imagination  Be creative.  We all have a creative side; it doesn t matter if it s oil painting, sand castles, or mud pies! This will also kick up the endorphins.    Witness Beauty  (AKA stop and smell the roses) Take a look outside, even in mid-winter. Notice colors, textures. Watch the squirrels and birds.     Service to others  Be of service to others.  There is always someone else in need.  By helping others we can  get out of ourselves  and remember the really important things.  This also provides opportunities for practicing all the other parts of the program.    Humor  Laugh and be silly!  Adjust your TV habits for less news and crime-drama and more comedy.    Control your stress  Try breathing deep, massage therapy, biofeedback, and meditation. Find time to relax each day.     My support system    Clinic Contact:  Phone number:    Contact 1:  Phone number:    Contact 2:  Phone number:    Religion/:  Phone number:    Therapist:  Phone number:    Local crisis center:    Phone number:    Other community support:  Phone number:

## 2018-10-16 NOTE — PROGRESS NOTES
SUBJECTIVE:   CC: Harrison Khan is an 33 year old male who presents for preventative health visit.     Physical   Annual:     Getting at least 3 servings of Calcium per day:  Yes    Bi-annual eye exam:  NO    Dental care twice a year:  Yes    Sleep apnea or symptoms of sleep apnea:  None    Diet:  Regular (no restrictions)    Frequency of exercise:  4-5 days/week    Duration of exercise:  45-60 minutes    Taking medications regularly:  Yes    Medication side effects:  None    Additional concerns today:  No      Refill gum.  Diets.  seroquel- taking 1/2 tab now.      Today's PHQ-2 Score:   PHQ-2 ( 1999 Pfizer) 10/16/2018   Q1: Little interest or pleasure in doing things 0   Q2: Feeling down, depressed or hopeless 0   PHQ-2 Score 0   Q1: Little interest or pleasure in doing things Not at all   Q2: Feeling down, depressed or hopeless Not at all   PHQ-2 Score 0       Abuse: Current or Past(Physical, Sexual or Emotional)- No  Do you feel safe in your environment - Yes    Social History   Substance Use Topics     Smoking status: Former Smoker     Types: Cigarettes     Smokeless tobacco: Current User     Types: Chew     Alcohol use 0.0 oz/week     0 Standard drinks or equivalent per week      Comment: occasional     Alcohol Use 10/16/2018   If you drink alcohol do you typically have greater than 3 drinks per day OR greater than 7 drinks per week? No       Last PSA: No results found for: PSA    Reviewed orders with patient. Reviewed health maintenance and updated orders accordingly - Yes  Labs reviewed in EPIC  BP Readings from Last 3 Encounters:   10/16/18 122/80   11/14/17 118/58   11/11/17 122/60    Wt Readings from Last 3 Encounters:   10/16/18 269 lb (122 kg)   11/14/17 255 lb (115.7 kg)   11/11/17 265 lb (120.2 kg)           Reviewed and updated as needed this visit by clinical staff  Tobacco  Allergies  Meds  Med Hx  Surg Hx  Fam Hx  Soc Hx        Reviewed and updated as needed this visit by Provider       "    Review of Systems  CONSTITUTIONAL: NEGATIVE for fever, chills, change in weight  INTEGUMENTARY/SKIN: NEGATIVE for worrisome rashes, moles or lesions  EYES: NEGATIVE for vision changes or irritation  ENT: NEGATIVE for ear, mouth and throat problems  RESP: NEGATIVE for significant cough or SOB  CV: NEGATIVE for chest pain, palpitations or peripheral edema  GI: NEGATIVE for nausea, abdominal pain, heartburn, or change in bowel habits   male: negative for dysuria, hematuria, decreased urinary stream, erectile dysfunction, urethral discharge  MUSCULOSKELETAL: NEGATIVE for significant arthralgias or myalgia  NEURO: NEGATIVE for weakness, dizziness or paresthesias  PSYCHIATRIC: NEGATIVE for changes in mood or affect    OBJECTIVE:   /80 (BP Location: Right arm, Patient Position: Sitting, Cuff Size: Adult Large)  Pulse 74  Temp 98.9  F (37.2  C) (Oral)  Resp 16  Ht 5' 11\" (1.803 m)  Wt 269 lb (122 kg)  BMI 37.52 kg/m2    Physical Exam  GENERAL: healthy, alert and no distress  EYES: Eyes grossly normal to inspection, PERRL and conjunctivae and sclerae normal  HENT: ear canals and TM's normal, nose and mouth without ulcers or lesions  NECK: no adenopathy, no asymmetry, masses, or scars and thyroid normal to palpation  RESP: lungs clear to auscultation - no rales, rhonchi or wheezes  CV: regular rate and rhythm, normal S1 S2, no S3 or S4, no murmur, click or rub, no peripheral edema and peripheral pulses strong  ABDOMEN: soft, nontender, no hepatosplenomegaly, no masses and bowel sounds normal  MS: no gross musculoskeletal defects noted, no edema  SKIN: no suspicious lesions or rashes  NEURO: Normal strength and tone, mentation intact and speech normal  PSYCH: mentation appears normal, affect normal/bright    Diagnostic Test Results:  Results for orders placed or performed in visit on 11/14/17   Lipid panel reflex to direct LDL Fasting   Result Value Ref Range    Cholesterol 223 (H) <200 mg/dL    Triglycerides " "151 (H) <150 mg/dL    HDL Cholesterol 45 >39 mg/dL    LDL Cholesterol Calculated 148 (H) <100 mg/dL    Non HDL Cholesterol 178 (H) <130 mg/dL   CBC with platelets   Result Value Ref Range    WBC 7.3 4.0 - 11.0 10e9/L    RBC Count 5.09 4.4 - 5.9 10e12/L    Hemoglobin 16.1 13.3 - 17.7 g/dL    Hematocrit 46.4 40.0 - 53.0 %    MCV 91 78 - 100 fl    MCH 31.6 26.5 - 33.0 pg    MCHC 34.7 31.5 - 36.5 g/dL    RDW 12.2 10.0 - 15.0 %    Platelet Count 291 150 - 450 10e9/L       ASSESSMENT/PLAN:   1. Encounter for routine adult medical exam with abnormal findings    - Lipid panel reflex to direct LDL Fasting; Future  - TSH with free T4 reflex; Future  - CBC with platelets; Future  - Comprehensive metabolic panel; Future    2. Chronic insomnia  - continue with seroquel, he is not in need of refills at this time.    3. Tobacco abuse disorder  Refills given for gum.  - nicotine polacrilex (NICORETTE) 2 MG gum; Place 1 each (2 mg) inside cheek as needed for smoking cessation  Dispense: 100 tablet; Refill: 1    4. Need for prophylactic vaccination and inoculation against influenza    - FLU VACCINE, SPLIT VIRUS, IM (QUADRIVALENT) [46014]- >3 YRS  - Vaccine Administration, Initial [49261]    COUNSELING:   Reviewed preventive health counseling, as reflected in patient instructions    BP Readings from Last 1 Encounters:   10/16/18 122/80     Estimated body mass index is 37.52 kg/(m^2) as calculated from the following:    Height as of this encounter: 5' 11\" (1.803 m).    Weight as of this encounter: 269 lb (122 kg).    BP Screening:   Last 3 BP Readings:    BP Readings from Last 3 Encounters:   10/16/18 122/80   11/14/17 118/58   11/11/17 122/60       The following was recommended to the patient:  Re-screen BP within a year and recommended lifestyle modifications  Weight management plan: Discussed healthy diet and exercise guidelines and patient will follow up in 6 months in clinic to re-evaluate.     reports that he has quit smoking. " His smoking use included Cigarettes. His smokeless tobacco use includes Chew.  Tobacco Cessation Action Plan: Pharmacotherapies : other Nicotine replacement    Counseling Resources:  ATP IV Guidelines  Pooled Cohorts Equation Calculator  FRAX Risk Assessment  ICSI Preventive Guidelines  Dietary Guidelines for Americans, 2010  USDA's MyPlate  ASA Prophylaxis  Lung CA Screening    Eliseo Benitez PA-C  [facility]

## 2018-10-16 NOTE — MR AVS SNAPSHOT
After Visit Summary   10/16/2018    Harrison Khan    MRN: [id number]           Patient Information     Date Of Birth          1985        Visit Information        Provider Department      10/16/2018 3:40 PM Eliseo Benitez PA-C [facility]        Today's Diagnoses     Encounter for routine adult medical exam with abnormal findings    -  1    Chronic insomnia        Tobacco abuse disorder          Care Instructions      Preventive Health Recommendations  Male Ages 26 - 39    Yearly exam:             See your health care provider every year in order to  o   Review health changes.   o   Discuss preventive care.    o   Review your medicines if your doctor has prescribed any.    You should be tested each year for STDs (sexually transmitted diseases), if you re at risk.     After age 35, talk to your provider about cholesterol testing. If you are at risk for heart disease, have your cholesterol tested at least every 5 years.     If you are at risk for diabetes, you should have a diabetes test (fasting glucose).  Shots: Get a flu shot each year. Get a tetanus shot every 10 years.     Nutrition:    Eat at least 5 servings of fruits and vegetables daily.     Eat whole-grain bread, whole-wheat pasta and brown rice instead of white grains and rice.     Get adequate Calcium and Vitamin D.     Lifestyle    Exercise for at least 150 minutes a week (30 minutes a day, 5 days a week). This will help you control your weight and prevent disease.     Limit alcohol to one drink per day.     No smoking.     Wear sunscreen to prevent skin cancer.     See your dentist every six months for an exam and cleaning.     5:2 diet  DASH diet  Eating Heart-Healthy Food: Using the DASH Plan    Eating for your heart doesn t have to be hard or boring. You just need to know how to make healthier choices. The DASH eating plan has been developed to help you do just that. DASH stands for Dietary Approaches to  Stop Hypertension. It is a plan that has been proven to be healthier for your heart and to lower your risk for high blood pressure. It can also help lower your risk for cancer, heart disease, osteoporosis, and diabetes.  Choosing from each food group  Choose foods from each of the food groups below each day. Try to get the recommended number of servings for each food group. The serving numbers are based on a diet of 2,000 calories a day. Talk to your doctor if you re unsure about your calorie needs. Along with getting the correct servings, the DASH plan also recommends a sodium intake less than 2,300 mg per day.        Grains  Servings: 6 to 8 a day  A serving is:    1 slice bread    1 ounce dry cereal    Half a cup cooked rice, pasta or cereal  Best choices: Whole grains and any grains high in fiber. Vegetables  Servings: 4 to 5 a day  A serving is:    1 cup raw leafy vegetable    Half a cup cut-up raw or cooked vegetable    Half a cup vegetable juice  Best choices: Fresh or frozen vegetables prepared without added salt or fat.   Fruits  Servings: 4 to 5 a day  A serving is:    1 medium fruit    One-quarter cup dried fruit    Half a cup fresh, frozen, or canned fruit    Half a cup of 100% fruit juices  Best choices: A variety of fresh fruits of different colors. Whole fruits are a better choice than fruit juices. Low-fat or fat-free dairy  Servings: 2 to 3 a day  A serving is:    1 cup milk    1 cup yogurt    One and a half ounces cheese  Best choices: Skim or 1% milk, low-fat or fat-free yogurt or buttermilk, and low-fat cheeses.         Lean meats, poultry, fish  Servings: 6 or fewer a day  A serving is:    1 ounce cooked meats, poultry, or fish    1 egg  Best choices: Lean poultry and fish. Trim away visible fat. Broil, grill, roast, or boil instead of frying. Remove skin from poultry before eating. Limit how much red meat you eat.  Nuts, seeds, beans  Servings: 4 to 5 a week  A serving is:    One-third cup nuts  (one and a half ounces)    2 tablespoons nut butter or seeds    Half a cup cooked dry beans or legumes  Best choices: Dry roasted nuts with no salt added, lentils, kidney beans, garbanzo beans, and whole baires beans.   Fats and oils  Servings: 2 to 3 a day  A serving is:    1 teaspoon vegetable oil    1 teaspoon soft margarine    1 tablespoon mayonnaise    2 tablespoons salad dressing  Best choices: Nut and vegetable oils (nontropical vegetable oils), such as olive and canola oil. Sweets  Servings: 5 a week or fewer  A serving is:    1 tablespoon sugar, maple syrup, or honey    1 tablespoon jam or jelly    1 half-ounce jelly beans (about 15)    1 cup lemonade  Best choices: Dried fruit can be a satisfying sweet. Choose low-fat sweets. And watch your serving sizes!      For more on the DASH eating plan, visit:  www.nhlbi.nih.gov/health/health-topics/topics/dash   Date Last Reviewed: 6/1/2016 2000-2017 [employer]. [address] [place] [address]. All rights reserved. This information is not intended as a substitute for professional medical care. Always follow your healthcare professional's instructions.                Follow-ups after your visit        Follow-up notes from your care team     Return in about 1 year (around 10/16/2019) for Physical Exam.      Future tests that were ordered for you today     Open Future Orders        Priority Expected Expires Ordered    Lipid panel reflex to direct LDL Fasting Routine  11/16/2018 10/16/2018    TSH with free T4 reflex Routine  11/16/2018 10/16/2018    CBC with platelets Routine  11/16/2018 10/16/2018    Comprehensive metabolic panel Routine  11/16/2018 10/16/2018            Who to contact     If you have questions or need follow up information about today's clinic visit or your schedule please contact [facility] directly at [phone].  Normal or non-critical lab and imaging results will be communicated to you by [facility]  "letter or phone within 4 business days after the clinic has received the results. If you do not hear from us within 7 days, please contact the clinic through [employer] or phone. If you have a critical or abnormal lab result, we will notify you by phone as soon as possible.  Submit refill requests through [employer] or call your pharmacy and they will forward the refill request to us. Please allow 3 business days for your refill to be completed.          Additional Information About Your Visit        [employer] Information     [employer] gives you secure access to your electronic health record. If you see a primary care provider, you can also send messages to your care team and make appointments. If you have questions, please call your primary care clinic.  If you do not have a primary care provider, please call [phone] and they will assist you.        Care EveryWhere ID     This is your Care EveryWhere ID. This could be used by other organizations to access your [facility] medical records  [id number]        Your Vitals Were     Pulse Temperature Respirations Height BMI (Body Mass Index)       74 98.9  F (37.2  C) (Oral) 16 5' 11\" (1.803 m) 37.52 kg/m2        Blood Pressure from Last 3 Encounters:   10/16/18 122/80   11/14/17 118/58   11/11/17 122/60    Weight from Last 3 Encounters:   10/16/18 269 lb (122 kg)   11/14/17 255 lb (115.7 kg)   11/11/17 265 lb (120.2 kg)              We Performed the Following     DEPRESSION ACTION PLAN (DAP)          Today's Medication Changes          These changes are accurate as of 10/16/18  4:26 PM.  If you have any questions, ask your nurse or doctor.               These medicines have changed or have updated prescriptions.        Dose/Directions    * nicotine polacrilex 4 MG gum   Commonly known as:  [employer] NICOTINE POLACRILEX   This may have changed:  how much to take   Used for:  Tobacco abuse disorder        Dose:  4 mg   Place 1 each (4 mg) inside cheek as needed for smoking cessation "   Quantity:  270 tablet   Refills:  0       * nicotine polacrilex 2 MG gum   Commonly known as:  NICORETTE   This may have changed:  You were already taking a medication with the same name, and this prescription was added. Make sure you understand how and when to take each.   Used for:  Tobacco abuse disorder   Changed by:  Eliseo Benitez PA-C        Dose:  2 mg   Place 1 each (2 mg) inside cheek as needed for smoking cessation   Quantity:  100 tablet   Refills:  1       * Notice:  This list has 2 medication(s) that are the same as other medications prescribed for you. Read the directions carefully, and ask your doctor or other care provider to review them with you.         Where to get your medicines      These medications were sent to [facility] [address] [place] [address] [address] [address] [facility] [id number]  [address] [facility] [address]     Phone:  [phone]     nicotine polacrilex 2 MG gum                Primary Care Provider Office Phone # Fax #    Eliseo Benitez PA-C [phone]  [facility] [id number]        Equal Access to Services     MANUEL EARL AH: Hadii aad ku hadasho Soomaali, waaxda luqadaha, qaybta kaalmada adeegyada, waxay idiin hayaan adeamaya baum ah. So [facility] [phone].    ATENCIÓN: Si habla español, tiene a lafleur disposición servicios gratuitos de asistencia lingüística. Pearl al [phone].    We comply with applicable federal civil rights laws and [place] laws. We do not discriminate on the basis of race, color, national origin, age, disability, sex, sexual orientation, or gender identity.            Thank you!     Thank you for choosing [facility]  for your care. Our goal is always to provide you with excellent care. Hearing back from our patients is one way we can continue to improve our services. Please take a few minutes to complete the written survey that you may receive in the  mail after your visit with us. Thank you!             Your Updated Medication List - Protect others around you: Learn how to safely use, store and throw away your medicines at www.disposemymeds.org.          This list is accurate as of 10/16/18  4:26 PM.  Always use your most recent med list.                   Brand Name Dispense Instructions for use Diagnosis    * nicotine polacrilex 4 MG gum    CVS NICOTINE POLACRILEX    270 tablet    Place 1 each (4 mg) inside cheek as needed for smoking cessation    Tobacco abuse disorder       * nicotine polacrilex 2 MG gum    NICORETTE    100 tablet    Place 1 each (2 mg) inside cheek as needed for smoking cessation    Tobacco abuse disorder       QUEtiapine 25 MG tablet    SEROquel    180 tablet    TAKE 1 TO 2 TABLETS BY MOUTH EVERY NIGHT AT BEDTIME AS NEEDED FOR INSOMNIA.    Shift work sleep disorder       VENTOLIN  (90 Base) MCG/ACT inhaler   Generic drug:  albuterol           * Notice:  This list has 2 medication(s) that are the same as other medications prescribed for you. Read the directions carefully, and ask your doctor or other care provider to review them with you.

## 2018-10-17 DIAGNOSIS — Z00.01 ENCOUNTER FOR ROUTINE ADULT MEDICAL EXAM WITH ABNORMAL FINDINGS: ICD-10-CM

## 2018-10-17 LAB
ALBUMIN SERPL-MCNC: 3.7 G/DL (ref 3.4–5)
ALP SERPL-CCNC: 76 U/L (ref 40–150)
ALT SERPL W P-5'-P-CCNC: 42 U/L (ref 0–70)
ANION GAP SERPL CALCULATED.3IONS-SCNC: 7 MMOL/L (ref 3–14)
AST SERPL W P-5'-P-CCNC: 26 U/L (ref 0–45)
BILIRUB SERPL-MCNC: 0.6 MG/DL (ref 0.2–1.3)
BUN SERPL-MCNC: 16 MG/DL (ref 7–30)
CALCIUM SERPL-MCNC: 8.8 MG/DL (ref 8.5–10.1)
CHLORIDE SERPL-SCNC: 106 MMOL/L (ref 94–109)
CHOLEST SERPL-MCNC: 200 MG/DL
CO2 SERPL-SCNC: 27 MMOL/L (ref 20–32)
CREAT SERPL-MCNC: 1.22 MG/DL (ref 0.66–1.25)
ERYTHROCYTE [DISTWIDTH] IN BLOOD BY AUTOMATED COUNT: 11.7 % (ref 10–15)
GFR SERPL CREATININE-BSD FRML MDRD: 69 ML/MIN/1.7M2
GLUCOSE SERPL-MCNC: 73 MG/DL (ref 70–99)
HCT VFR BLD AUTO: 42.9 % (ref 40–53)
HDLC SERPL-MCNC: 41 MG/DL
HGB BLD-MCNC: 14.7 G/DL (ref 13.3–17.7)
LDLC SERPL CALC-MCNC: 101 MG/DL
MCH RBC QN AUTO: 32.2 PG (ref 26.5–33)
MCHC RBC AUTO-ENTMCNC: 34.3 G/DL (ref 31.5–36.5)
MCV RBC AUTO: 94 FL (ref 78–100)
NONHDLC SERPL-MCNC: 159 MG/DL
PLATELET # BLD AUTO: 254 10E9/L (ref 150–450)
POTASSIUM SERPL-SCNC: 4 MMOL/L (ref 3.4–5.3)
PROT SERPL-MCNC: 7.3 G/DL (ref 6.8–8.8)
RBC # BLD AUTO: 4.57 10E12/L (ref 4.4–5.9)
SODIUM SERPL-SCNC: 140 MMOL/L (ref 133–144)
TRIGL SERPL-MCNC: 292 MG/DL
TSH SERPL DL<=0.005 MIU/L-ACNC: 3.08 MU/L (ref 0.4–4)
WBC # BLD AUTO: 8 10E9/L (ref 4–11)

## 2018-10-17 PROCEDURE — 36415 COLL VENOUS BLD VENIPUNCTURE: CPT | Performed by: PHYSICIAN ASSISTANT

## 2018-10-17 PROCEDURE — 84443 ASSAY THYROID STIM HORMONE: CPT | Performed by: PHYSICIAN ASSISTANT

## 2018-10-17 PROCEDURE — 80053 COMPREHEN METABOLIC PANEL: CPT | Performed by: PHYSICIAN ASSISTANT

## 2018-10-17 PROCEDURE — 85027 COMPLETE CBC AUTOMATED: CPT | Performed by: PHYSICIAN ASSISTANT

## 2018-10-17 PROCEDURE — 80061 LIPID PANEL: CPT | Performed by: PHYSICIAN ASSISTANT

## 2018-10-17 ASSESSMENT — ANXIETY QUESTIONNAIRES: GAD7 TOTAL SCORE: 0

## 2018-10-17 ASSESSMENT — PATIENT HEALTH QUESTIONNAIRE - PHQ9: SUM OF ALL RESPONSES TO PHQ QUESTIONS 1-9: 0

## 2018-12-13 ENCOUNTER — OFFICE VISIT (OUTPATIENT)
Dept: FAMILY MEDICINE | Facility: CLINIC | Age: 33
End: 2018-12-13
Payer: COMMERCIAL

## 2018-12-13 VITALS
DIASTOLIC BLOOD PRESSURE: 84 MMHG | WEIGHT: 262.8 LBS | OXYGEN SATURATION: 96 % | HEIGHT: 70 IN | SYSTOLIC BLOOD PRESSURE: 128 MMHG | HEART RATE: 74 BPM | RESPIRATION RATE: 16 BRPM | TEMPERATURE: 98.4 F | BODY MASS INDEX: 37.62 KG/M2

## 2018-12-13 DIAGNOSIS — F41.9 ANXIETY: ICD-10-CM

## 2018-12-13 DIAGNOSIS — F41.9 ANXIETY: Primary | ICD-10-CM

## 2018-12-13 PROCEDURE — 99213 OFFICE O/P EST LOW 20 MIN: CPT | Performed by: PHYSICIAN ASSISTANT

## 2018-12-13 RX ORDER — LORAZEPAM 1 MG/1
1 TABLET ORAL EVERY 8 HOURS PRN
Qty: 30 TABLET | Refills: 0 | Status: SHIPPED | OUTPATIENT
Start: 2018-12-13 | End: 2019-01-17

## 2018-12-13 ASSESSMENT — MIFFLIN-ST. JEOR: SCORE: 2153.3

## 2018-12-13 ASSESSMENT — ANXIETY QUESTIONNAIRES
7. FEELING AFRAID AS IF SOMETHING AWFUL MIGHT HAPPEN: NOT AT ALL
5. BEING SO RESTLESS THAT IT IS HARD TO SIT STILL: NOT AT ALL
1. FEELING NERVOUS, ANXIOUS, OR ON EDGE: MORE THAN HALF THE DAYS
6. BECOMING EASILY ANNOYED OR IRRITABLE: MORE THAN HALF THE DAYS
GAD7 TOTAL SCORE: 10
3. WORRYING TOO MUCH ABOUT DIFFERENT THINGS: MORE THAN HALF THE DAYS
2. NOT BEING ABLE TO STOP OR CONTROL WORRYING: MORE THAN HALF THE DAYS
IF YOU CHECKED OFF ANY PROBLEMS ON THIS QUESTIONNAIRE, HOW DIFFICULT HAVE THESE PROBLEMS MADE IT FOR YOU TO DO YOUR WORK, TAKE CARE OF THINGS AT HOME, OR GET ALONG WITH OTHER PEOPLE: VERY DIFFICULT

## 2018-12-13 ASSESSMENT — PATIENT HEALTH QUESTIONNAIRE - PHQ9
SUM OF ALL RESPONSES TO PHQ QUESTIONS 1-9: 2
5. POOR APPETITE OR OVEREATING: MORE THAN HALF THE DAYS

## 2018-12-13 NOTE — TELEPHONE ENCOUNTER
"Requested Prescriptions   Pending Prescriptions Disp Refills     sertraline (ZOLOFT) 50 MG tablet [Pharmacy Med Name: SERTRALINE 50MG TABLETS] 81 tablet 1    Last Written Prescription Date:  12/13/18  End:12/13/19  Last Fill Quantity: 30,  # refills: 1   Last Office Visit: 12/13/18  Emmanuel      Return in about 1 month (around 1/13/2019) for Med Check, Follow up.     Future Office Visit:      Sig: TAKE ONE-HALF TABLET BY MOUTH DAILY FOR 1 TO 2 WEEKS, THEN INCREASE TO 1 TABLET BY MOUTH DAILY THEREAFTER.    SSRIs Protocol Passed - 12/13/2018 11:19 AM   PHQ-9 SCORE 11/14/2017 10/16/2018 12/13/2018   PHQ-9 Total Score - - -   PHQ-9 Total Score 0 0 2     ELY-7 SCORE 11/14/2017 10/16/2018 12/13/2018   Total Score - - -   Total Score 0 0 10         Passed - Recent (12 mo) or future (30 days) visit within the authorizing provider's specialty    Patient had office visit in the last 12 months or has a visit in the next 30 days with authorizing provider or within the authorizing provider's specialty.  See \"Patient Info\" tab in inbasket, or \"Choose Columns\" in Meds & Orders section of the refill encounter.             Passed - Patient is age 18 or older          Are they asking for 90 day supply?    "

## 2018-12-13 NOTE — PROGRESS NOTES
"  SUBJECTIVE:   Harrison Khan is a 33 year old male who presents to clinic today for the following health issues:      History of Present Illness     Depression & Anxiety Follow-up:     Depression/Anxiety:  Anxiety only    Status since last visit::  Worsened    Other associated symptoms of anxiety::  YES    Significant life event::  No    Current substance use::  None    Depression symptoms::  None       Today's PHQ-9         PHQ-9 Total Score:         PHQ-9 Q9 Suicidal ideation:       Thoughts of suicide or self harm:      Self-harm Plan:        Self-harm Action:          Safety concerns for self or others:            Kwaku is here to discuss a recurrance of his trouble with anxiety.  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  He has a therapy appointment in 3-4 weeks with someone who specifically deals with these situations but he feels he cannot wait that long and would like to restart medication.  He recently  got sick (flu like) and wonders if this triggered it.  For two weeks he kind of felt it coming on but last two nights have been very bad.  He is just seroquel for sleep right now but it is not working.  Celexa has worked well in the past but had sexual side effects.      Problem list and histories reviewed & adjusted, as indicated.  Additional history: as documented      ROS:  Constitutional, HEENT, cardiovascular, pulmonary, gi and gu systems are negative, except as otherwise noted.    OBJECTIVE:     /84 (BP Location: Right arm, Patient Position: Chair, Cuff Size: Adult Large)   Pulse 74   Temp 98.4  F (36.9  C) (Oral)   Resp 16   Ht 1.778 m (5' 10\")   Wt 119.2 kg (262 lb 12.8 oz)   SpO2 96%   BMI 37.71 kg/m    Body mass index is 37.71 kg/m .  GENERAL: healthy, alert and no distress  PSYCH: mentation appears normal, anxious and fatigued    Diagnostic Test Results:  none     ASSESSMENT/PLAN:   1. Anxiety  Start on zoloft, Ativan for panic and sleep.  He is " going on vacation with his boyfriend and family so hopefully will have time to relax.  He will attend his therapy appointment.  We discussed prazosin for dreams that he can talk to the therapist about.  He will follow up in 2-3 weeks here as well.    - sertraline (ZOLOFT) 50 MG tablet; Take 1/2 tablet (25 mg) for 1-2 weeks, then increase to 1 tablet orally daily  Dispense: 30 tablet; Refill: 1  - LORazepam (ATIVAN) 1 MG tablet; Take 1 tablet (1 mg) by mouth every 8 hours as needed for anxiety or sleep  Dispense: 30 tablet; Refill: 0        Eliseo Benitez PA-C  [facility]

## 2018-12-14 ASSESSMENT — ANXIETY QUESTIONNAIRES: GAD7 TOTAL SCORE: 10

## 2019-01-17 ENCOUNTER — OFFICE VISIT (OUTPATIENT)
Dept: FAMILY MEDICINE | Facility: CLINIC | Age: 34
End: 2019-01-17
Payer: COMMERCIAL

## 2019-01-17 VITALS
SYSTOLIC BLOOD PRESSURE: 110 MMHG | HEART RATE: 76 BPM | BODY MASS INDEX: 36.73 KG/M2 | RESPIRATION RATE: 16 BRPM | TEMPERATURE: 97.8 F | WEIGHT: 256 LBS | DIASTOLIC BLOOD PRESSURE: 70 MMHG

## 2019-01-17 DIAGNOSIS — F43.10 PTSD (POST-TRAUMATIC STRESS DISORDER): ICD-10-CM

## 2019-01-17 DIAGNOSIS — G47.00 INSOMNIA, UNSPECIFIED TYPE: ICD-10-CM

## 2019-01-17 DIAGNOSIS — F41.9 ANXIETY: Primary | ICD-10-CM

## 2019-01-17 PROCEDURE — 99214 OFFICE O/P EST MOD 30 MIN: CPT | Performed by: PHYSICIAN ASSISTANT

## 2019-01-17 RX ORDER — SERTRALINE HYDROCHLORIDE 100 MG/1
100 TABLET, FILM COATED ORAL DAILY
Qty: 90 TABLET | Refills: 0 | Status: SHIPPED | OUTPATIENT
Start: 2019-01-17 | End: 2019-04-17

## 2019-01-17 RX ORDER — PRAZOSIN HYDROCHLORIDE 1 MG/1
1 CAPSULE ORAL AT BEDTIME
Qty: 90 CAPSULE | Refills: 0 | Status: SHIPPED | OUTPATIENT
Start: 2019-01-17 | End: 2019-01-28

## 2019-01-17 RX ORDER — LORAZEPAM 1 MG/1
1 TABLET ORAL EVERY 8 HOURS PRN
Qty: 30 TABLET | Refills: 0 | Status: SHIPPED | OUTPATIENT
Start: 2019-01-17 | End: 2019-02-07

## 2019-01-17 ASSESSMENT — ANXIETY QUESTIONNAIRES
3. WORRYING TOO MUCH ABOUT DIFFERENT THINGS: NOT AT ALL
6. BECOMING EASILY ANNOYED OR IRRITABLE: NOT AT ALL
1. FEELING NERVOUS, ANXIOUS, OR ON EDGE: SEVERAL DAYS
5. BEING SO RESTLESS THAT IT IS HARD TO SIT STILL: NOT AT ALL
7. FEELING AFRAID AS IF SOMETHING AWFUL MIGHT HAPPEN: NOT AT ALL
2. NOT BEING ABLE TO STOP OR CONTROL WORRYING: NOT AT ALL
IF YOU CHECKED OFF ANY PROBLEMS ON THIS QUESTIONNAIRE, HOW DIFFICULT HAVE THESE PROBLEMS MADE IT FOR YOU TO DO YOUR WORK, TAKE CARE OF THINGS AT HOME, OR GET ALONG WITH OTHER PEOPLE: SOMEWHAT DIFFICULT
GAD7 TOTAL SCORE: 2

## 2019-01-17 ASSESSMENT — PATIENT HEALTH QUESTIONNAIRE - PHQ9
5. POOR APPETITE OR OVEREATING: SEVERAL DAYS
SUM OF ALL RESPONSES TO PHQ QUESTIONS 1-9: 2

## 2019-01-17 NOTE — PROGRESS NOTES
SUBJECTIVE:   Harrison Khan is a 33 year old male who presents to clinic today for the following health issues:        History of Present Illness     Depression & Anxiety Follow-up:     Depression/Anxiety:  Anxiety only    Status since last visit::  Improved    Other associated symptoms of anxiety::  None    Significant life event::  No    Current substance use::  None    Depression symptoms::  None       Today's PHQ-9         PHQ-9 Total Score:         PHQ-9 Q9 Suicidal ideation:       Thoughts of suicide or self harm:      Self-harm Plan:        Self-harm Action:          Safety concerns for self or others:            Working 4-2am.  Cannot sleep on the nights that he is working.  Some thoughts, recurring things are keeping him up. Cannot get to sleep, but once asleep, generally stays asleep. Ativan does work but tries not to take.  When not working sleep is better.  Taking only 25mg, 50 makes mouth too dry.  Saw the therapist once so far, another appointment next week.  Did EMDR and plans to do this again next week.      Problem list and histories reviewed & adjusted, as indicated.  Additional history: as documented        ROS:  Constitutional, HEENT, cardiovascular, pulmonary, gi and gu systems are negative, except as otherwise noted.    OBJECTIVE:     /70 (BP Location: Right arm, Patient Position: Sitting, Cuff Size: Adult Large)   Pulse 76   Temp 97.8  F (36.6  C) (Oral)   Resp 16   Wt 116.1 kg (256 lb)   BMI 36.73 kg/m    Body mass index is 36.73 kg/m .  GENERAL: healthy, alert and no distress  PSYCH: mentation appears normal, affect normal/bright    Diagnostic Test Results:  none     ASSESSMENT/PLAN:   1. Anxiety  He is doing very well since starting zoloft.  Agreeable to increasing to 100mg to see if this will improve sleep.  I did refill Ativan today as well.  - sertraline (ZOLOFT) 100 MG tablet; Take 1 tablet (100 mg) by mouth daily  Dispense: 90 tablet; Refill: 0  - LORazepam (ATIVAN) 1  MG tablet; Take 1 tablet (1 mg) by mouth every 8 hours as needed for anxiety or sleep  Dispense: 30 tablet; Refill: 0    2. PTSD (post-traumatic stress disorder)  Will see if prazosin will help with sleep, there is some recurring thoughts, dreams keeping him up at night.  If this is not helpful he can try two.  If not helpful overall Ativan will be ok short term.  - sertraline (ZOLOFT) 100 MG tablet; Take 1 tablet (100 mg) by mouth daily  Dispense: 90 tablet; Refill: 0  - prazosin (MINIPRESS) 1 MG capsule; Take 1 capsule (1 mg) by mouth At Bedtime  Dispense: 90 capsule; Refill: 0    3. Insomnia, unspecified type    - prazosin (MINIPRESS) 1 MG capsule; Take 1 capsule (1 mg) by mouth At Bedtime  Dispense: 90 capsule; Refill: 0      Follow up in 3 months, continue with therapy.    Eliseo Benitez PA-C  [facility]

## 2019-01-18 ASSESSMENT — ANXIETY QUESTIONNAIRES: GAD7 TOTAL SCORE: 2

## 2019-01-22 ENCOUNTER — TELEPHONE (OUTPATIENT)
Dept: FAMILY MEDICINE | Facility: CLINIC | Age: 34
End: 2019-01-22

## 2019-01-22 NOTE — TELEPHONE ENCOUNTER
Patient called and stated that the Minipress made him fall out of bed when going to the bathroom during the night and he felt light headed on it. Stopped taking it. Does not want to take in the future.     Will continue on other medication talked about at last appointment for PTSD.     Will schedule an appointment if he feels it is necessary in the future.     Patient requested that message be sent as FYI.     How would you like to take off medication list?    Shea Koroma RN Flex

## 2019-02-04 ENCOUNTER — OFFICE VISIT (OUTPATIENT)
Dept: FAMILY MEDICINE | Facility: CLINIC | Age: 34
End: 2019-02-04
Payer: COMMERCIAL

## 2019-02-04 VITALS
DIASTOLIC BLOOD PRESSURE: 66 MMHG | HEART RATE: 68 BPM | WEIGHT: 251 LBS | TEMPERATURE: 98 F | BODY MASS INDEX: 35.93 KG/M2 | SYSTOLIC BLOOD PRESSURE: 118 MMHG | RESPIRATION RATE: 16 BRPM | HEIGHT: 70 IN

## 2019-02-04 DIAGNOSIS — F51.04 CHRONIC INSOMNIA: Primary | ICD-10-CM

## 2019-02-04 PROCEDURE — 99213 OFFICE O/P EST LOW 20 MIN: CPT | Performed by: PHYSICIAN ASSISTANT

## 2019-02-04 RX ORDER — ZALEPLON 5 MG/1
5 CAPSULE ORAL AT BEDTIME
Qty: 30 CAPSULE | Refills: 1 | Status: SHIPPED | OUTPATIENT
Start: 2019-02-04 | End: 2019-03-06

## 2019-02-04 ASSESSMENT — MIFFLIN-ST. JEOR: SCORE: 2099.78

## 2019-02-04 NOTE — PROGRESS NOTES
"  SUBJECTIVE:   Harrison Khan is a 33 year old male who presents to clinic today for the following health issues:      Insomnia  Onset: ongoing    Description:   Time to fall asleep (sleep latency): hours  Middle of night awakening:  no  Early morning awakening:  no    Progression of Symptoms:  worsening and constant    Accompanying Signs & Symptoms:  Daytime sleepiness/napping: YES- daytime sleepiness  Excessive snoring/apnea: no  Restless legs: no  Frequent urination: no  Chronic pain:  no    History:  Prior Insomnia: YES    Precipitating factors:   New stressful situation: no  Caffeine intake: no  OTC decongestants: no  Any new medications: YES- Zoloft, Ativan    Alleviating factors:  Self medicating (alcohol, etc.):  YES- some    Therapies Tried and outcome: Ativan, Zoloft    Trouble falling asleep.  Once he falls asleep he will stay asleep.  1 ativan did not help but then 3 hours later took another 1/2 and then he fell sleep.  Too much seroquel give restlessness and itching.  Prazosin caused him to fall \"out of bed\".  He got up in the middle of the night and immediately fell.        Problem list and histories reviewed & adjusted, as indicated.  Additional history: as documented      Reviewed and updated as needed this visit by clinical staff  Tobacco  Allergies  Meds  Med Hx  Surg Hx  Fam Hx  Soc Hx      Reviewed and updated as needed this visit by Provider         ROS:  Constitutional, HEENT, cardiovascular, pulmonary, gi and gu systems are negative, except as otherwise noted.    OBJECTIVE:     /66 (BP Location: Right arm, Patient Position: Sitting, Cuff Size: Adult Large)   Pulse 68   Temp 98  F (36.7  C) (Oral)   Resp 16   Ht 1.778 m (5' 10\")   Wt 113.9 kg (251 lb)   BMI 36.01 kg/m    Body mass index is 36.01 kg/m .  GENERAL: healthy, alert and no distress  PSYCH: mentation appears normal, affect normal/bright    Diagnostic Test Results:  none     ASSESSMENT/PLAN:   1. Chronic " insomnia  He has tried this class of medication before (Ambien) without success. Advised caution.  Follow up in 2-4 weeks for med check.  - zaleplon (SONATA) 5 MG capsule; Take 1 capsule (5 mg) by mouth At Bedtime  Dispense: 30 capsule; Refill: 1        Eliseo Benitez PA-C  [facility]

## 2019-02-07 ENCOUNTER — TELEPHONE (OUTPATIENT)
Dept: FAMILY MEDICINE | Facility: CLINIC | Age: 34
End: 2019-02-07

## 2019-02-07 DIAGNOSIS — F41.9 ANXIETY: ICD-10-CM

## 2019-02-07 RX ORDER — LORAZEPAM 1 MG/1
1 TABLET ORAL EVERY 8 HOURS PRN
Qty: 30 TABLET | Refills: 0 | Status: SHIPPED | OUTPATIENT
Start: 2019-02-07 | End: 2019-04-09

## 2019-02-07 NOTE — TELEPHONE ENCOUNTER
Pt calling in to clinic, got home after work at 2 am, took Sonata, still awake now at 8 am, feels like it makes his anxiety worse    zaleplon (SONATA) 5 MG capsule, has used 3 times without success, in fact made it worse     Disp Refills Start End CRISTINA   LORazepam (ATIVAN) 1 MG tablet 30 tablet 0 1/17/2019  No   Sig - Route: Take 1 tablet (1 mg) by mouth every 8 hours as needed for anxiety or sleep - Oral     Would like refill of Ativan, scheduled appt to be seen on Monday, but like refill until he can come in to discuss other plan  I see Concha sent you the refill request yesterday in separate encounter    Route to provider to review and advise    Faith Bates RN Nurse Triage

## 2019-02-07 NOTE — TELEPHONE ENCOUNTER
Rx was faxed to [facility], pharmacy will notify patient when ready to be picked up.     Jose Carlos Yadav   02/07/19 1:39 PM

## 2019-02-11 ENCOUNTER — OFFICE VISIT (OUTPATIENT)
Dept: FAMILY MEDICINE | Facility: CLINIC | Age: 34
End: 2019-02-11
Payer: COMMERCIAL

## 2019-02-11 VITALS
RESPIRATION RATE: 16 BRPM | SYSTOLIC BLOOD PRESSURE: 126 MMHG | DIASTOLIC BLOOD PRESSURE: 70 MMHG | WEIGHT: 248 LBS | TEMPERATURE: 98.2 F | HEART RATE: 70 BPM | BODY MASS INDEX: 35.58 KG/M2

## 2019-02-11 DIAGNOSIS — F51.04 CHRONIC INSOMNIA: Primary | ICD-10-CM

## 2019-02-11 DIAGNOSIS — F51.04 CHRONIC INSOMNIA: ICD-10-CM

## 2019-02-11 DIAGNOSIS — Z09 NEED FOR IMMUNIZATION FOLLOW-UP: ICD-10-CM

## 2019-02-11 PROCEDURE — 90714 TD VACC NO PRESV 7 YRS+ IM: CPT | Performed by: PHYSICIAN ASSISTANT

## 2019-02-11 PROCEDURE — 99213 OFFICE O/P EST LOW 20 MIN: CPT | Mod: 25 | Performed by: PHYSICIAN ASSISTANT

## 2019-02-11 PROCEDURE — 90471 IMMUNIZATION ADMIN: CPT | Performed by: PHYSICIAN ASSISTANT

## 2019-02-11 RX ORDER — MIRTAZAPINE 15 MG/1
15 TABLET, FILM COATED ORAL AT BEDTIME
Qty: 30 TABLET | Refills: 0 | Status: SHIPPED | OUTPATIENT
Start: 2019-02-11 | End: 2019-03-06

## 2019-02-11 NOTE — TELEPHONE ENCOUNTER
"Requested Prescriptions   Pending Prescriptions Disp Refills     mirtazapine (REMERON) 15 MG tablet [Pharmacy Med Name: MIRTAZAPINE 15MG TABLETS]  Last Written Prescription Date:  2/11/19  Last Fill Quantity: 30 TABELT,  # refills: 0   Last office visit: 2/4/19 with prescribing provider:  KELLY   Future Office Visit:     90 tablet 0     Sig: TAKE 1 TABLET(15 MG) BY MOUTH AT BEDTIME    Atypical Antidepressants Protocol Passed - 2/11/2019  2:59 PM       Passed - Recent (12 mo) or future (30 days) visit within the authorizing provider's specialty    Patient had office visit in the last 12 months or has a visit in the next 30 days with authorizing provider or within the authorizing provider's specialty.  See \"Patient Info\" tab in inbasket, or \"Choose Columns\" in Meds & Orders section of the refill encounter.             Passed - Medication active on med list       Passed - Patient is age 18 or older          "

## 2019-02-11 NOTE — NURSING NOTE
Screening Questionnaire for Adult Immunization    Are you sick today?   No   Do you have allergies to medications, food, a vaccine component or latex?   No   Have you ever had a serious reaction after receiving a vaccination?   No   Do you have a long-term health problem with heart disease, lung disease, asthma, kidney disease, metabolic disease (e.g. diabetes), anemia, or other blood disorder?   No   Do you have cancer, leukemia, HIV/AIDS, or any other immune system problem?   No   In the past 3 months, have you taken medications that affect  your immune system, such as prednisone, other steroids, or anticancer drugs; drugs for the treatment of rheumatoid arthritis, Crohn s disease, or psoriasis; or have you had radiation treatments?   No   Have you had a seizure, or a brain or other nervous system problem?   No   During the past year, have you received a transfusion of blood or blood     products, or been given immune (gamma) globulin or antiviral drug?   No   For women: Are you pregnant or is there a chance you could become        pregnant during the next month?   No   Have you received any vaccinations in the past 4 weeks?   No     Immunization questionnaire answers were all negative.        Per orders of MARIYA Escoto, injection of Td given by Lindsay Castro. Patient instructed to remain in clinic for 15 minutes afterwards, and to report any adverse reaction to me immediately.       Screening performed by Lindsay Castro on 2/11/2019 at 2:45 PM.

## 2019-02-11 NOTE — PROGRESS NOTES
SUBJECTIVE:   Harrison Khan is a 33 year old male who presents to clinic today for the following health issues:      History of Present Illness     Depression & Anxiety Follow-up:     Depression/Anxiety:  Anxiety only    Status since last visit::  Worsened    Other associated symptoms of anxiety::  YES    Significant life event::  No    Current substance use::  None    Depression symptoms::  None       Today's PHQ-9         PHQ-9 Total Score:         PHQ-9 Q9 Suicidal ideation:       Thoughts of suicide or self harm:      Self-harm Plan:        Self-harm Action:          Safety concerns for self or others:              Ambien- NO sleep walking  Lunesta- NO not effective  Sonata- NO panic attacks  Restoril- NO worked for short time then stopped working  Minipress- NO had orthostatic drop in BP and fell out of bed  Trazodone- NO- did not work  Various OTC's- benadryl, Unisom, Melatonin- not effective      Seroquel- was working well for a long time.  Currently will work only if he adds a 1mg Ativan.  Then usually also has to take another 0.5mg (1/2 tab) as he wakes [facility] through the night.  He has not been doing this regularly as he is not sure if it is ok to be taking so much of the Ativan.    Every night when getting close to bed he starts to panic now.  Has done a sleep study in the past and all that was done is to tell him he did not have sleep apnea.  He has also seen sleep therapists but this has not resolved his problems.  He is currently seeing a therapist but not specifically for sleep.      Problem list and histories reviewed & adjusted, as indicated.  Additional history: as documented        ROS:  Constitutional, HEENT, cardiovascular, pulmonary, gi and gu systems are negative, except as otherwise noted.    OBJECTIVE:     /70 (BP Location: Right arm, Patient Position: Sitting, Cuff Size: Adult Large)   Pulse 70   Temp 98.2  F (36.8  C) (Oral)   Resp 16   Wt 112.5 kg (248 lb)   BMI 35.58  kg/m    Body mass index is 35.58 kg/m .  GENERAL: healthy, alert and no distress  PSYCH: mentation appears normal, affect normal/bright    Diagnostic Test Results:  none     ASSESSMENT/PLAN:   1. Chronic insomnia  He is willing to try another new med.  We discussed referrals again to sleep medicine though he does not have sleep apnea he gets frustrated with seeing a sleep specialist.  He will speak with his therapist as well.  - mirtazapine (REMERON) 15 MG tablet; Take 1 tablet (15 mg) by mouth At Bedtime  Dispense: 30 tablet; Refill: 0    2. Need for immunization follow-up    - ADMIN 1st VACCINE        Eliseo Benitez PA-C  [facility]

## 2019-02-12 RX ORDER — MIRTAZAPINE 15 MG/1
TABLET, FILM COATED ORAL
Qty: 90 TABLET | Refills: 0 | OUTPATIENT
Start: 2019-02-12

## 2019-02-12 NOTE — TELEPHONE ENCOUNTER
RX was filled yesterday at appointment 2/11/2019 for 30 day supply. Patient was advised to follow up in 1 month.  Will not give 90 day supply.  Concha Vital RN

## 2019-03-06 ENCOUNTER — TELEPHONE (OUTPATIENT)
Dept: FAMILY MEDICINE | Facility: CLINIC | Age: 34
End: 2019-03-06

## 2019-03-08 DIAGNOSIS — G47.26 SHIFT WORK SLEEP DISORDER: ICD-10-CM

## 2019-03-08 RX ORDER — QUETIAPINE FUMARATE 25 MG/1
TABLET, FILM COATED ORAL
Qty: 180 TABLET | Refills: 0 | Status: SHIPPED | OUTPATIENT
Start: 2019-03-08 | End: 2019-04-17

## 2019-03-08 NOTE — TELEPHONE ENCOUNTER
"Requested Prescriptions   Pending Prescriptions Disp Refills     QUEtiapine (SEROQUEL) 25 MG tablet [Pharmacy Med Name: QUETIAPINE 25MG TABLETS] 180 tablet 0    Last Written Prescription Date:  10/11/2018  Last Fill Quantity: 180 tablet,  # refills: 0   Last office visit: 2/11/2019 with prescribing provider:  02/11/2019   Future Office Visit:     Sig: TAKE 1 TO 2 TABLETS BY MOUTH EVERY NIGHT AT BEDTIME AS NEEDED FOR INSOMNIA.    Antipsychotic Medications Passed - 3/8/2019  3:25 AM       Passed - Blood pressure under 140/90 in past 12 months    BP Readings from Last 3 Encounters:   02/11/19 126/70   02/04/19 118/66   01/17/19 110/70                Passed - Patient is 12 years of age or older       Passed - Lipid panel on file within the past 12 months    Recent Labs   Lab Test 10/17/18  1002   CHOL 200*   TRIG 292*   HDL 41   *   NHDL 159*              Passed - CBC on file in past 12 months    Recent Labs   Lab Test 10/17/18  1002   WBC 8.0   RBC 4.57   HGB 14.7   HCT 42.9                   Passed - Heart Rate on file within past 12 months    Pulse Readings from Last 3 Encounters:   02/11/19 70   02/04/19 68   01/17/19 76              Passed - A1c or Glucose on file in past 12 months    Recent Labs   Lab Test 10/17/18  1002 06/02/17  1607   GLC 73 95   A1C  --  4.9       Please review patients last 3 weights. If a weight gain of >10 lbs exists, you may refill the prescription once after instructing the patient to schedule an appointment within the next 30 days.    Wt Readings from Last 3 Encounters:   02/11/19 112.5 kg (248 lb)   02/04/19 113.9 kg (251 lb)   01/17/19 116.1 kg (256 lb)            Passed - Medication is active on med list       Passed - Recent (6 mo) or future (30 days) visit within the authorizing provider's specialty    Patient had office visit in the last 6 months or has a visit in the next 30 days with authorizing provider or within the authorizing provider's specialty.  See \"Patient " "Info\" tab in inbasket, or \"Choose Columns\" in Meds & Orders section of the refill encounter.            Sae PRADOT  "

## 2019-03-08 NOTE — TELEPHONE ENCOUNTER
Prescription approved per [facility] Refill Protocol.  Concha Vital RN

## 2019-04-17 ENCOUNTER — OFFICE VISIT (OUTPATIENT)
Dept: FAMILY MEDICINE | Facility: CLINIC | Age: 34
End: 2019-04-17
Payer: COMMERCIAL

## 2019-04-17 VITALS
WEIGHT: 253 LBS | BODY MASS INDEX: 36.3 KG/M2 | RESPIRATION RATE: 16 BRPM | TEMPERATURE: 97.7 F | HEART RATE: 60 BPM | SYSTOLIC BLOOD PRESSURE: 108 MMHG | DIASTOLIC BLOOD PRESSURE: 62 MMHG

## 2019-04-17 DIAGNOSIS — F43.10 PTSD (POST-TRAUMATIC STRESS DISORDER): Primary | ICD-10-CM

## 2019-04-17 DIAGNOSIS — F41.9 ANXIETY: ICD-10-CM

## 2019-04-17 DIAGNOSIS — F43.10 PTSD (POST-TRAUMATIC STRESS DISORDER): ICD-10-CM

## 2019-04-17 PROCEDURE — 99213 OFFICE O/P EST LOW 20 MIN: CPT | Performed by: PHYSICIAN ASSISTANT

## 2019-04-17 RX ORDER — PRAZOSIN HYDROCHLORIDE 1 MG/1
1 CAPSULE ORAL AT BEDTIME
Qty: 15 CAPSULE | Refills: 0 | Status: SHIPPED | OUTPATIENT
Start: 2019-04-17 | End: 2019-06-03

## 2019-04-17 RX ORDER — SERTRALINE HYDROCHLORIDE 100 MG/1
100 TABLET, FILM COATED ORAL DAILY
Qty: 90 TABLET | Refills: 0 | Status: SHIPPED | OUTPATIENT
Start: 2019-04-17 | End: 2019-09-09

## 2019-04-17 ASSESSMENT — ANXIETY QUESTIONNAIRES
2. NOT BEING ABLE TO STOP OR CONTROL WORRYING: MORE THAN HALF THE DAYS
6. BECOMING EASILY ANNOYED OR IRRITABLE: MORE THAN HALF THE DAYS
1. FEELING NERVOUS, ANXIOUS, OR ON EDGE: MORE THAN HALF THE DAYS
5. BEING SO RESTLESS THAT IT IS HARD TO SIT STILL: MORE THAN HALF THE DAYS
GAD7 TOTAL SCORE: 14
3. WORRYING TOO MUCH ABOUT DIFFERENT THINGS: MORE THAN HALF THE DAYS
7. FEELING AFRAID AS IF SOMETHING AWFUL MIGHT HAPPEN: MORE THAN HALF THE DAYS

## 2019-04-17 ASSESSMENT — PATIENT HEALTH QUESTIONNAIRE - PHQ9
5. POOR APPETITE OR OVEREATING: MORE THAN HALF THE DAYS
SUM OF ALL RESPONSES TO PHQ QUESTIONS 1-9: 2

## 2019-04-17 NOTE — Clinical Note
Lance Ames has seen you before.  He is hoping to see you again.  I placed referral though the counseling center but hoping that he can get in soon.  I can talk to you when in clinic also.  Can you see him soon?  Struggling with PTSD symptoms.

## 2019-04-17 NOTE — NURSING NOTE
Taking Quetiapine, Sertraline, Sonata, Ambien, and Remeron.  Takes Ativan as needed-symptoms improve in 15-30 minutes.     Remeron-increases appetite and resulted in weight gain.    States he does not have depression symptoms.  Instead has anxiety and sleep disturbances.  Still able to do work.  Occasionally wants to stay home due to flashbacks.     Recently changed positions.  NAVA-was a different type of stress.  New position results in more traumatic experiences including death calls. States he loves his job and does not change careers.     Gets flashbacks in the shower.  Also wakes up in the middle of the night sweating with nightmares and flashbacks.  States flashbacks are worse currently after recently seeing a death at work.  Currently having flashbacks for past week & dreading going back to work.     Previous therapist did EMDR therapy.  He did not like this type of therapy.  Therapist was expensive and located far away.  Asked for a referral for Gloria today instead of current therapist.    Willing to try Prazosin again.  Tried in the past. Got up quickly out of bed while on the medication and passed out-stopped medication at that time.

## 2019-04-17 NOTE — TELEPHONE ENCOUNTER
"Pharm is requesting a 90 day supply.    Requested Prescriptions   Pending Prescriptions Disp Refills     prazosin (MINIPRESS) 1 MG capsule [Pharmacy Med Name: PRAZOSIN 1MG CAPSULES] 90 capsule 0     Sig: TAKE 1 CAPSULE(1 MG) BY MOUTH AT BEDTIME   Last Written Prescription Date:  4/17/19  Last Fill Quantity: 15,  # refills: 0   Last Office Visit: 4/17/19  Emmanuel      Return in about 1 month (around 5/17/2019) for Follow up.     Future Office Visit:         Alpha Blockers Passed - 4/17/2019  3:27 PM        Passed - Blood pressure under 140/90 in past 12 months     BP Readings from Last 3 Encounters:   04/17/19 108/62   02/11/19 126/70   02/04/19 118/66                 Passed - Recent (12 mo) or future (30 days) visit within the authorizing provider's specialty     Patient had office visit in the last 12 months or has a visit in the next 30 days with authorizing provider or within the authorizing provider's specialty.  See \"Patient Info\" tab in inbasket, or \"Choose Columns\" in Meds & Orders section of the refill encounter.              Passed - Patient does not have Tadalafil, Vardenafil, or Sildenafil on their medication list        Passed - Medication is active on med list        Passed - Patient is 18 years of age or older          "

## 2019-04-17 NOTE — PROGRESS NOTES
"  SUBJECTIVE:   Harrison Khan is a 33 year old male who presents to clinic today for the following health issues:      History of Present Illness     Depression & Anxiety Follow-up:     Depression/Anxiety:  Anxiety only    Status since last visit::  Worsened    Other associated symptoms of anxiety::  None    Significant life event::  No    Current substance use::  None    Depression symptoms::  None       Today's PHQ-9         PHQ-9 Total Score:         PHQ-9 Q9 Thoughts of better off dead/self-harm past 2 weeks :       Thoughts of suicide or self harm:      Self-harm Plan:        Self-harm Action:          Safety concerns for self or others:            Medication Followup of Ativan, Sonata    Taking Medication as prescribed: yes    Side Effects:  None    Medication Helping Symptoms:  yes     Taking Sertraline for mood and PTSD symptoms.  Sonata and Remeron for sleep.  Recent request for Ativan due to increase in flash backs and waking in middle of night with nightmares.  There was another upsetting death that he had to deal with at his job that triggered these symptoms.  When this happens the Sonata and Remeron do not work. Adding Ativan DOES then help him sleep and not have the nightmares.  Symptoms improve in 15-30 minutes after taking medication.  Remeron-increases appetite and resulted in weight gain so far.  He continues to go to the gym as much as possible.    States he does not have depression symptoms currently.  Instead has anxiety and sleep disturbances.  Still able to go to work.  Occasionally wants to stay home due to flashbacks.     Recently changed positions to [employer] .  NAVA-was a \"different type of stress\".  New position results in more traumatic experiences including death calls. States he loves his job and does not want to change careers.  Gets flashbacks in the shower.  Also wakes up in the middle of the night sweating with nightmares and flashbacks.   Currently having " flashbacks for past week or so & dreading going back to work.     Previous therapist did EMDR therapy.  He did not like this type of therapy.  Therapist was expensive and located far away.  Asked for a referral for Gloria today instead of current therapist.    Willing to try Prazosin again.  Tried in the past. Got up quickly out of bed while on the medication and passed out-stopped medication at that time.       Additional history: as documented    Reviewed and updated as needed this visit by clinical staff  Tobacco  Allergies  Meds  Med Hx  Surg Hx  Fam Hx  Soc Hx        Reviewed and updated as needed this visit by Provider         Current Outpatient Medications   Medication Sig Dispense Refill     LORazepam (ATIVAN) 1 MG tablet Take 1 tablet (1 mg) by mouth every 8 hours as needed for anxiety or sleep 30 tablet 0     mirtazapine (REMERON) 15 MG tablet Take 1 tablet (15 mg) by mouth At Bedtime 90 tablet 0     prazosin (MINIPRESS) 1 MG capsule Take 1 capsule (1 mg) by mouth At Bedtime 15 capsule 0     sertraline (ZOLOFT) 100 MG tablet Take 1 tablet (100 mg) by mouth daily 90 tablet 0     zaleplon (SONATA) 5 MG capsule Take 2 capsules (10 mg) by mouth At Bedtime 60 capsule 1     nicotine polacrilex ([employer] NICOTINE POLACRILEX) 4 MG gum Place 1 each (4 mg) inside cheek as needed for smoking cessation (Patient not taking: Reported on 2/11/2019) 270 tablet 0     nicotine polacrilex (NICORETTE) 2 MG gum Place 1 each (2 mg) inside cheek as needed for smoking cessation (Patient not taking: Reported on 4/17/2019) 100 tablet 1     VENTOLIN  (90 BASE) MCG/ACT inhaler   0     BP Readings from Last 3 Encounters:   04/17/19 108/62   02/11/19 126/70   02/04/19 118/66    Wt Readings from Last 3 Encounters:   04/17/19 114.8 kg (253 lb)   02/11/19 112.5 kg (248 lb)   02/04/19 113.9 kg (251 lb)           ROS:  Constitutional, HEENT, cardiovascular, pulmonary, gi and gu systems are negative, except as otherwise  noted.    OBJECTIVE:     /62 (BP Location: Right arm, Patient Position: Sitting, Cuff Size: Adult Large)   Pulse 60   Temp 97.7  F (36.5  C) (Oral)   Resp 16   Wt 114.8 kg (253 lb)   BMI 36.30 kg/m    Body mass index is 36.3 kg/m .  GENERAL: healthy, alert and no distress  NEURO: Normal tone, mentation intact and speech normal  PSYCH: mentation appears normal, affect normal/bright    Diagnostic Test Results:  none     ASSESSMENT/PLAN:     1. PTSD (post-traumatic stress disorder)  Worsening since last traumatic work experience.  Plan to try Prazosin again and referral to therapy.  - prazosin (MINIPRESS) 1 MG capsule; Take 1 capsule (1 mg) by mouth At Bedtime  Dispense: 15 capsule; Refill: 0  - MENTAL HEALTH REFERRAL  - Adult; Outpatient Treatment; Individual/Couples/Family/Group Therapy/Health Psychology; [facility]: [facility] [phone]; We will contact you to schedule the appointment or please call with any questions  - sertraline (ZOLOFT) 100 MG tablet; Take 1 tablet (100 mg) by mouth daily  Dispense: 90 tablet; Refill: 0    2. Anxiety  Stable.  Will continue on current dose.  - sertraline (ZOLOFT) 100 MG tablet; Take 1 tablet (100 mg) by mouth daily  Dispense: 90 tablet; Refill: 0    FUTURE APPOINTMENTS:       - Follow-up visit in 1 month    ROSA BLUNT NP student    FREDRICK PhanC  [facility]

## 2019-04-18 RX ORDER — PRAZOSIN HYDROCHLORIDE 1 MG/1
CAPSULE ORAL
Qty: 90 CAPSULE | Refills: 0 | OUTPATIENT
Start: 2019-04-18

## 2019-04-18 ASSESSMENT — ANXIETY QUESTIONNAIRES: GAD7 TOTAL SCORE: 14

## 2019-05-28 ENCOUNTER — TELEPHONE (OUTPATIENT)
Dept: FAMILY MEDICINE | Facility: CLINIC | Age: 34
End: 2019-05-28

## 2019-05-28 NOTE — TELEPHONE ENCOUNTER
Appointment canceled for Harrison Khan ([id number])   Visit Type: MYCHART OFFICE VISIT SHORT   Date        Time      Length    Provider                  Department   5/29/2019   11:20 AM  20 mins.  Eliseo Benitez PA-C  [facility]      Reason for Cancellation: Cancelled via [email or web address]      Patient Comments: Parvin Gomes I switched to taking my Sterline to in the morning after doing some research online. I have slept really good the last two nights. I hopefully am on the right track now.     Concha Vital RN

## 2019-05-29 ENCOUNTER — MYC MEDICAL ADVICE (OUTPATIENT)
Dept: FAMILY MEDICINE | Facility: CLINIC | Age: 34
End: 2019-05-29

## 2019-05-29 DIAGNOSIS — F41.9 ANXIETY: ICD-10-CM

## 2019-05-29 NOTE — TELEPHONE ENCOUNTER
"Can you send this back?    Kwaku- I have wondered about this.  Zoloft (sertraline) can have a side effect of \"nightmares\" or dreams with it..... I didn't want to address it specifically to make it become an issue.  That is curious to me as it has an actual FDA indication for PTSD!  I\"m happy to hear that you have found that the AM dosing is helping and this could be why.    Gloria Zamora has said she will try to squeeze you in to her practice right now.  Have you had a chance to talk with her or her schedulers?  She is pretty busy right now but I think you should get in with her when you can.    I also have a refill request for more Ativan from you.  I can fill it but wondering what your status is with this?  Only curious.  You are still using the Sonata as well?  Just give me an update.      Thanks!  Eliseo"

## 2019-05-31 RX ORDER — LORAZEPAM 1 MG/1
1 TABLET ORAL EVERY 8 HOURS PRN
Qty: 30 TABLET | Refills: 0 | Status: SHIPPED | OUTPATIENT
Start: 2019-05-31 | End: 2019-08-06

## 2019-06-03 ENCOUNTER — OFFICE VISIT (OUTPATIENT)
Dept: FAMILY MEDICINE | Facility: CLINIC | Age: 34
End: 2019-06-03
Payer: COMMERCIAL

## 2019-06-03 VITALS
HEART RATE: 60 BPM | TEMPERATURE: 98.2 F | BODY MASS INDEX: 36.73 KG/M2 | DIASTOLIC BLOOD PRESSURE: 70 MMHG | RESPIRATION RATE: 16 BRPM | SYSTOLIC BLOOD PRESSURE: 108 MMHG | WEIGHT: 256 LBS

## 2019-06-03 DIAGNOSIS — F51.04 CHRONIC INSOMNIA: Primary | ICD-10-CM

## 2019-06-03 PROCEDURE — 99213 OFFICE O/P EST LOW 20 MIN: CPT | Performed by: PHYSICIAN ASSISTANT

## 2019-06-03 RX ORDER — QUETIAPINE FUMARATE 25 MG/1
25 TABLET, FILM COATED ORAL AT BEDTIME
Qty: 90 TABLET | Refills: 0 | Status: SHIPPED | OUTPATIENT
Start: 2019-06-03 | End: 2019-09-09

## 2019-06-03 NOTE — Clinical Note
Hi there!  You have seen Kwaku before.  He has been trying to get in with you.  He really needs someone to help with PTSD and sleep.  Any way you can try to slip him in somewhere?  I know that might be asking a lot right now.....

## 2019-06-03 NOTE — PROGRESS NOTES
"Subjective     Harrison Khan is a 33 year old male who presents to clinic today for the following health issues:    HPI   Medication Followup of Remeron    Taking Medication as prescribed: NO - stopped taking    Side Effects:  Night sweats, hot flashes    Medication Helping Symptoms:  Yes and no       Thinks Remeron was the cause.  Also eating.  Back on seroquel.  With also Sonata and this has been working for the last 3 days or so.  Used to do just seroquel before all of this started up again.  Picked up ativan but has not taken it.  Daily schedule will be better soon.  Will be working more days, less nights.  Says he tried the Prazosin again and \"fell out of bed\".  Likely an orthostatic BP drop.  Does not want to take this medication.    Reviewed and updated as needed this visit by Provider         Review of Systems   ROS COMP: Constitutional, HEENT, cardiovascular, pulmonary, gi and gu systems are negative, except as otherwise noted.      Objective    /70 (BP Location: Right arm, Patient Position: Sitting, Cuff Size: Adult Large)   Pulse 60   Temp 98.2  F (36.8  C) (Oral)   Resp 16   Wt 116.1 kg (256 lb)   BMI 36.73 kg/m    Body mass index is 36.73 kg/m .  Physical Exam   GENERAL: healthy, alert and no distress  PSYCH: mentation appears normal, affect normal/bright    Diagnostic Test Results:  Labs reviewed in Epic        Assessment & Plan     1. Chronic insomnia  OK to go back to seroquel and stop mirtazepine.  He wants to stay on Sonata also.  Discussed that we may need to wean off this at some point in the future.  He is aware of this.  - QUEtiapine (SEROQUEL) 25 MG tablet; Take 1 tablet (25 mg) by mouth At Bedtime  Dispense: 90 tablet; Refill: 0     *tried to get an appointment with Gloria Zamora, could not and was told he could go to [place] but did not want to drive this far.      BMI:   Estimated body mass index is 36.73 kg/m  as calculated from the following:    Height as of 2/4/19: " "1.778 m (5' 10\").    Weight as of this encounter: 116.1 kg (256 lb).         Return in about 3 months (around 9/3/2019) for Follow up, Med Check.    Eliseo Benitez PA-C  [facility]        "

## 2019-06-16 DIAGNOSIS — F51.04 CHRONIC INSOMNIA: ICD-10-CM

## 2019-06-17 NOTE — TELEPHONE ENCOUNTER
Requested Prescriptions   Pending Prescriptions Disp Refills     zaleplon (SONATA) 5 MG capsule [Pharmacy Med Name: ZALEPLON 5MG CAPSULES] 60 capsule 0     Sig: TAKE 2 CAPSULES BY MOUTH EVERY NIGHT AT BEDTIME   Last Written Prescription Date:  5/16/19  Last Fill Quantity: 60,  # refills: 0   Last Office Visit: 6/3/2019 Emmanuel      Return in about 3 months (around 9/3/2019) for Follow up, Med Check.     Future Office Visit:         There is no refill protocol information for this order

## 2019-06-18 ENCOUNTER — OFFICE VISIT (OUTPATIENT)
Dept: PSYCHOLOGY | Facility: CLINIC | Age: 34
End: 2019-06-18
Payer: COMMERCIAL

## 2019-06-18 DIAGNOSIS — F43.10 PTSD (POST-TRAUMATIC STRESS DISORDER): Primary | ICD-10-CM

## 2019-06-18 DIAGNOSIS — F41.1 GAD (GENERALIZED ANXIETY DISORDER): ICD-10-CM

## 2019-06-18 PROCEDURE — 90791 PSYCH DIAGNOSTIC EVALUATION: CPT | Performed by: PSYCHOLOGIST

## 2019-06-18 ASSESSMENT — COLUMBIA-SUICIDE SEVERITY RATING SCALE - C-SSRS
3. HAVE YOU BEEN THINKING ABOUT HOW YOU MIGHT KILL YOURSELF?: YES
1. IN THE PAST MONTH, HAVE YOU WISHED YOU WERE DEAD OR WISHED YOU COULD GO TO SLEEP AND NOT WAKE UP?: NO
TOTAL  NUMBER OF INTERRUPTED ATTEMPTS PAST 3 MONTHS: NO
1. IN THE PAST MONTH, HAVE YOU WISHED YOU WERE DEAD OR WISHED YOU COULD GO TO SLEEP AND NOT WAKE UP?: YES
TOTAL  NUMBER OF ABORTED OR SELF INTERRUPTED ATTEMPTS PAST LIFETIME: NO
4. HAVE YOU HAD THESE THOUGHTS AND HAD SOME INTENTION OF ACTING ON THEM?: YES
6. HAVE YOU EVER DONE ANYTHING, STARTED TO DO ANYTHING, OR PREPARED TO DO ANYTHING TO END YOUR LIFE?: NO
5. HAVE YOU STARTED TO WORK OUT OR WORKED OUT THE DETAILS OF HOW TO KILL YOURSELF? DO YOU INTEND TO CARRY OUT THIS PLAN?: NO
2. HAVE YOU ACTUALLY HAD ANY THOUGHTS OF KILLING YOURSELF LIFETIME?: YES
TOTAL  NUMBER OF INTERRUPTED ATTEMPTS LIFETIME: NO
ATTEMPT PAST THREE MONTHS: NO
TOTAL  NUMBER OF ABORTED OR SELF INTERRUPTED ATTEMPTS PAST 3 MONTHS: NO
6. HAVE YOU EVER DONE ANYTHING, STARTED TO DO ANYTHING, OR PREPARED TO DO ANYTHING TO END YOUR LIFE?: NO
ATTEMPT LIFETIME: NO
5. HAVE YOU STARTED TO WORK OUT OR WORKED OUT THE DETAILS OF HOW TO KILL YOURSELF? DO YOU INTEND TO CARRY OUT THIS PLAN?: YES
2. HAVE YOU ACTUALLY HAD ANY THOUGHTS OF KILLING YOURSELF?: NO

## 2019-06-18 ASSESSMENT — ANXIETY QUESTIONNAIRES
7. FEELING AFRAID AS IF SOMETHING AWFUL MIGHT HAPPEN: SEVERAL DAYS
GAD7 TOTAL SCORE: 8
1. FEELING NERVOUS, ANXIOUS, OR ON EDGE: SEVERAL DAYS
2. NOT BEING ABLE TO STOP OR CONTROL WORRYING: SEVERAL DAYS
IF YOU CHECKED OFF ANY PROBLEMS ON THIS QUESTIONNAIRE, HOW DIFFICULT HAVE THESE PROBLEMS MADE IT FOR YOU TO DO YOUR WORK, TAKE CARE OF THINGS AT HOME, OR GET ALONG WITH OTHER PEOPLE: SOMEWHAT DIFFICULT
6. BECOMING EASILY ANNOYED OR IRRITABLE: SEVERAL DAYS
3. WORRYING TOO MUCH ABOUT DIFFERENT THINGS: MORE THAN HALF THE DAYS
5. BEING SO RESTLESS THAT IT IS HARD TO SIT STILL: NOT AT ALL

## 2019-06-18 ASSESSMENT — PATIENT HEALTH QUESTIONNAIRE - PHQ9
5. POOR APPETITE OR OVEREATING: MORE THAN HALF THE DAYS
SUM OF ALL RESPONSES TO PHQ QUESTIONS 1-9: 4

## 2019-06-18 NOTE — PROGRESS NOTES
"                                                                                                                                                                      Adult Intake Structured Interview  Standard Diagnostic Assessment      CLIENT'S NAME: Harrison Khan  MRN:   [id number]  :   1985  ACCT. NUMBER: [id number]  DATE OF SERVICE: 19  VIDEO VISIT: No    Identifying Information:  Client is a 33 year old, , partnered / significant other male. Client was referred for counseling by self. Client is currently employed full time. Client attended the session alone.       Client's Statement of Presenting Concern:  Client reports the reason for seeking therapy at this time as help with anxiety, ptsd.  Client stated that his symptoms have resulted in the following functional impairments: increased symptoms at night- flashbacks      History of Presenting Concern:  Client reports that these problem(s) began over the past 3-5  years. Client has attempted to resolve these concerns in the past through therapy and medications. Client reports that other professional(s) are not involved in providing support / services.       Social History:  Client reported he grew up in [place], [place]. They were the second born of 3 children. This is an intact family and parents remain . Client reported that his childhood was \"normal\". Client described his current relationships with family of origin as connected.     Client reported a history of 3 committed relationships. Client has been partnered for 2 years. Client reported having 0 children. Client identified extensive stable and meaningful social connections. Client reported that he has not been involved with the legal system.  Client's highest education level was college graduate. Client did identify the following learning problems: attention, concentration and dyslexia. There are no ethnic, cultural or Taoism factors that may be relevant for therapy. " Client identified his preferred language to be English. Client reported he does not need the assistance of an  or other support involved in therapy. Modifications will not be used to assist communication in therapy. Client did not serve in the .      Client reports family history includes Cardiovascular in his maternal grandfather, maternal grandmother, paternal grandfather, and paternal grandmother; Hypertension in his father and mother.     Mental Health History:  Client has a brother with OCD, another brother with high functioning aspergers who also has torettes. Dad and mom have been  treated for depression and anxiety  Client previously received the following mental health diagnosis: Anxiety, Depression and Panic Disorder, and in grade school ADD .  Client has received the following mental health services in the past: counseling and medication(s) from physician / PCP.   Client is not currently receiving any mental health services.        Chemical Health History:  Client has not received chemical dependency treatment in the past. Client is not currently receiving any chemical dependency treatment. Client reports no problems as a result of their drinking / drug use.      Client Reports:  Client denies using alcohol.  Client denies using tobacco.  Client denies using marijuana.  Client denies using caffeine.  Client denies using street drugs.  Client denies the non-medical use of prescription or over the counter drugs.    CAGE: None of the patient's responses to the CAGE screening were positive / Negative CAGE score   Based on the negative Cage-Aid score and clinical interview there  are not indications of drug or alcohol abuse.    Discussed the general effects of drugs and alcohol on health and well-being. Therapist gave client printed information about the effects of chemical use on his health and well being.      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      Medical Issues:  Client has had a physical exam to rule out medical causes for current symptoms. Date of last physical exam was within the past year. Client was encouraged to follow up with PCP if symptoms were to develop. The client has a [place] Primary Care Provider, who is named Eliseo Benitez.. The client reports not having a psychiatrist. Client reports no current medical concerns. The client denies the presence of chronic or episodic pain. There are not significant nutritional concerns.     Client reports current meds as:   Current Outpatient Medications   Medication Sig     LORazepam (ATIVAN) 1 MG tablet Take 1 tablet (1 mg) by mouth every 8 hours as needed for anxiety or sleep     nicotine polacrilex (NICORETTE) 2 MG gum Place 1 each (2 mg) inside cheek as needed for smoking cessation     QUEtiapine (SEROQUEL) 25 MG tablet Take 1 tablet (25 mg) by mouth At Bedtime     sertraline (ZOLOFT) 100 MG tablet Take 1 tablet (100 mg) by mouth daily     VENTOLIN  (90 BASE) MCG/ACT inhaler      zaleplon (SONATA) 5 MG capsule TAKE 2 CAPSULES BY MOUTH EVERY NIGHT AT BEDTIME     No current facility-administered medications for this visit.        Client Allergies:  Allergies   Allergen Reactions     Wellbutrin [Bupropion] Hives     Bactrim [Sulfamethoxazole W/Trimethoprim]      Penicillins          Medical History:  Past Medical History:   Diagnosis Date     Allergic rhinitis, cause unspecified      Attention deficit disorder without mention of hyperactivity      Chronic insomnia 3/3/2014     Generalised anxiety disorder 7/6/2012     Migraine     Occasional hemiplegic characteristics     Obsessive-compulsive disorders     sari high         Medication Adherence:  Client reports taking prescribed medications as prescribed.    Client was provided recommendation to follow-up with prescribing physician.    Mental Status  Assessment:  Appearance:   Appropriate   Eye Contact:   Good   Psychomotor Behavior: Normal   Attitude:   Cooperative   Orientation:   All  Speech   Rate / Production: Normal    Volume:  Normal   Mood:    Normal  Affect:    Appropriate   Thought Content:  Clear   Thought Form:  Coherent  Logical   Insight:    Good       Review of Symptoms:  Depression: Sleep Energy Irritability  Monica:  No symptoms  Psychosis: No symptoms  Anxiety: Worries  Panic:  Palpitations  Post Traumatic Stress Disorder: Re-experiencing of Trauma Avoid Traumatic Stimuli Increased Arousal  Obsessive Compulsive Disorder: No symptoms  Eating Disorder: No symptoms  Oppositional Defiant Disorder: No symptoms  ADD / ADHD: Distractiblity  Conduct Disorder: No symptoms      Safety Assessment:    History of Safety Concerns:   Client has had a history of suicidal ideation: 2011 had a job change, was concerned about job loss and financial ruin. Also had come out as vásquez to his Hindu family  all around the same time. Hospitalizations: [place] in 2011 - stayed for 3 days. Had some med changes. Denies a history of suicide attempts, self-injurious behavior, homicidal ideation, homicidal behavior and and other safety concerns  Client denied a history of personal safety concerns.    Client denied a history of assaultive behaviors.      Current Safety Concerns:  Client denies current fears or concerns for personal safety.  Client denies current or recent suicidal ideation or behaviors.  Client denies current or recent homicidal ideation or behaviors.  Client denies current or recent self injurious behavior or ideation.  Client denies other safety concerns.  Client reports the following protective factors: positive relationships positive family connections, forward/future oriented thinking, dedication to family/friends, safe and stable environment, living with other people, daily obligations, structured day, effective problem-solving skills, committment to  well-being, positive social skills and financial stability    Client reports there are firearms in the house. The firearms are secured in a locked space.    A safety and risk management plan has not  been developed at this time,  client was given the crisis number / 911 should there be a change in any of these risk factors. A safety plan will be forthcoming in next session.     Client's Strengths and Limitations:  Client identified the following strengths or resources that will help him succeed in counseling: commitment to health and well being, friends / good social support, family support, intelligence and sense of humor. Client identified the following supports: family and friends. Things that may interfere with the client's success in counseling include: time.      Diagnostic Criteria:   - Restlessness or feeling keyed up or on edge.    - Being easily fatigued.    - Difficulty concentrating or mind going blank.    - Irritability.    - Muscle tension.    - Sleep disturbance (difficulty falling or staying asleep, or restless unsatisfying sleep).   A. The person has been exposed to a traumatic event in which both of the following were present:     (1) the person experienced, witnessed, or was confronted with an event or events that involved actual or threatened death or serious injury, or a threat to the physical integrity of self or others  B. The traumatic event is persistently reexperienced in one (or more) of the following ways:     - Recurrent and intrusive distressing recollections of the event, including images, thoughts, or perceptions. Note: In young children, repetitive play may occur in which themes or aspects of the trauma are expressed.      - Recurrent distressing dreams of the event. Note: In children, there may be frightening dreams without recognizable content.      - Intense psychological distress at exposure to internal or external cues that symbolize or resemble an aspect of the traumatic event.    C. Persistent avoidance of stimuli associated with the trauma and numbing of general responsiveness (not present before the trauma), as indicated by three (or more) of the following:  D. Persistent symptoms of increased arousal (not present before the trauma), as indicated by two (or more) of the following:     - Difficulty falling or staying asleep.      - Hypervigilance.   E. Duration of the disturbance is more than 1 month.  F. The disturbance causes clinically significant distress or impairment in social, occupational, or other important areas of functioning.      Functional Status:  Client's symptoms have caused and are causing reduced functional status in the following areas: Activities of Daily Living - sleep      DSM5 Diagnoses: (Sustained by DSM5 Criteria Listed Above)  Diagnoses: ELY, PTSD, ADD by history  Psychosocial & Contextual Factors: police work  WHODAS 2.0 (12 item)            This questionnaire asks about difficulties due to health conditions. Health conditions  include  disease or illnesses, other health problems that may be short or long lasting,  injuries, mental health or emotional problems, and problems with alcohol or drugs.                     Think back over the past 30 days and answer these questions, thinking about how much  difficulty you had doing the following activities. For each question, please [place] only  one response.    S1 Standing for long periods such as 30 minutes? None =         1   S2 Taking care of household responsibilities? None =         1   S3 Learning a new task, for example, learning how to get to a new place? None =         1   S4 How much of a problem do you have joining community activities (for example, festivals, Denominational or other activities) in the same way as anyone else can? None =         1   S5 How much have you been emotionally affected by your health problems? Mild =           2     In the past 30 days, how much difficulty did you have in:   S6  Concentrating on doing something for ten minutes? None =         1   S7 Walking a long distance such as a kilometer (or equivalent)? None =         1   S8 Washing your whole body? None =         1   S9 Getting dressed? None =         1   S10 Dealing with people you do not know? None =         1   S11 Maintaining a friendship? None =         1   S12 Your day to day work? Mild =           2     H1 Overall, in the past 30 days, how many days were these difficulties present? Record number of days 10   H2 In the past 30 days, for how many days were you totally unable to carry out your usual activities or work because of any health condition? Record number of days  0   H3 In the past 30 days, not counting the days that you were totally unable, for how many days did you cut back or reduce your usual activities or work because of any health condition? Record number of days 2     Attendance Agreement:  Client has signed Attendance Agreement:Yes      Collaboration:  The client is receiving treatment / structured support from the following professional(s) / service and treatment. Collaboration will be initiated with: primary care physician.      Preliminary Treatment Plan:  The client reports no currently identified Mandaen, ethnic or cultural issues relevant to therapy.     services are not indicated.    Modifications to assist communication are not indicated.    The concerns identified by the client will be addressed in therapy.    Initial Treatment will focus on: Anxiety - PTSD- flashbacks.  CGI= 4/4 on 6-18-19  ( logged on 6-20-19)  CSSI completed on 6-18-19    As a preliminary treatment goal, client will experience a reduction in anxiety, will develop more effective coping skills to manage anxiety symptoms, will develop healthy cognitive patterns and beliefs and will increase ability to function adaptively.    The focus of initial interventions will be to alleviate anxiety, increase ability to function  adaptively, increase coping skills, process traumas, teach CBT skills, teach DBT skills, teach problem-solving skills and teach relaxation strategies.    Referral to another professional/service is not indicated at this time..    A Release of Information is not needed at this time.    Report to child / adult protection services was NA.    Client will have access to their State mental health facility' medical record.    Gloria Zamora LP  June 18, 2019

## 2019-06-19 RX ORDER — ZALEPLON 5 MG/1
CAPSULE ORAL
Qty: 60 CAPSULE | Refills: 2 | Status: SHIPPED | OUTPATIENT
Start: 2019-06-19 | End: 2019-09-17

## 2019-06-19 ASSESSMENT — ANXIETY QUESTIONNAIRES: GAD7 TOTAL SCORE: 8

## 2019-07-01 ENCOUNTER — OFFICE VISIT (OUTPATIENT)
Dept: PSYCHOLOGY | Facility: CLINIC | Age: 34
End: 2019-07-01
Payer: COMMERCIAL

## 2019-07-01 DIAGNOSIS — F43.10 PTSD (POST-TRAUMATIC STRESS DISORDER): Primary | ICD-10-CM

## 2019-07-01 DIAGNOSIS — F41.1 GAD (GENERALIZED ANXIETY DISORDER): ICD-10-CM

## 2019-07-01 PROCEDURE — 90834 PSYTX W PT 45 MINUTES: CPT | Performed by: PSYCHOLOGIST

## 2019-07-02 NOTE — PROGRESS NOTES
Progress Note    Patient Name: Harrison Khan  Date: 7-01-19         Service Type: Individual  Video Visit: No     Session Start Time: 4:30  Session End Time: 5:20   Session Length: 38- 52 min    Session #: 2    Attendees: Client     Treatment Plan Last Reviewed: pending  PHQ-9 / ELY-7 : see emr    DATA  Interactive Complexity: No  Crisis: No       Progress Since Last Session (Related to Symptoms / Goals / Homework):   Symptoms: stable    Homework: Completed in session      Episode of Care Goals: Satisfactory progress - ACTION (Actively working towards change); Intervened by reinforcing change plan / affirming steps taken     Current / Ongoing Stressors and Concerns:   Flashbacks, grief, anxiety      Treatment Objective(s) Addressed in This Session:   teach EFT       Intervention:   Introduced EFT  Assisted client in writing meaningful stem statements.  Identified core emotions that he'd like to release.        ASSESSMENT: Current Emotional / Mental Status (status of significant symptoms):   Risk status (Self / Other harm or suicidal ideation)   Patient denies current fears or concerns for personal safety.   Patient denies current or recent suicidal ideation or behaviors.   Patientdenies current or recent homicidal ideation or behaviors.   Patient denies current or recent self injurious behavior or ideation.   Patient denies other safety concerns.   Patient Patient reports there has been no change in risk factors since their last session.     PatientPatient reports there has been no change in protective factors since their last session.     Recommended that patient call 911 or go to the local ED should there be a change in any of these risk factors.     Appearance:   Appropriate    Eye Contact:   Good    Psychomotor Behavior: Normal    Attitude:   Cooperative    Orientation:   All   Speech    Rate / Production: Normal     Volume:  Normal    Mood:    Anxious  Normal Sad     Affect:    Appropriate  Worrisome    Thought Content:  Clear    Thought Form:  Coherent  Logical    Insight:    Good      Medication Review:   No current psychiatric medications prescribed     Medication Compliance:   NA     Changes in Health Issues:   None reported     Chemical Use Review:   Substance Use: Chemical use reviewed, no active concerns identified      Tobacco Use: No current tobacco use.      Diagnosis:  1. PTSD (post-traumatic stress disorder)    2. ELY (generalized anxiety disorder)        Collateral Reports Completed:   Routed note to PCP as needed    PLAN: (Patient Tasks / Therapist Tasks / Other)  practice EFT        Gloria Zamora LP                                                         ______________________________________________________________________    Treatment Plan    Patient's Name: Harrison Khan  YOB: 1985      DSM5 Diagnoses: PTSD, ELY  Psychosocial / Contextual Factors:   WHODAS: see emr    Referral / Collaboration:  Referral to another professional/service is not indicated at this time..    Anticipated number of session or this episode of care: 20      MeasurableTreatment Goal(s) related to diagnosis / functional impairment(s)  Goal 1: Patient will    I will know I've met my goal when      Objective #A (Patient Action)    Patient will PTSD  Status:     Intervention(s)  Therapist will           Gloria Zamora LP  July 2, 2019

## 2019-07-23 ENCOUNTER — OFFICE VISIT (OUTPATIENT)
Dept: PSYCHOLOGY | Facility: CLINIC | Age: 34
End: 2019-07-23
Payer: COMMERCIAL

## 2019-07-23 DIAGNOSIS — F43.10 PTSD (POST-TRAUMATIC STRESS DISORDER): Primary | ICD-10-CM

## 2019-07-23 DIAGNOSIS — F41.1 GAD (GENERALIZED ANXIETY DISORDER): ICD-10-CM

## 2019-07-23 PROCEDURE — 90834 PSYTX W PT 45 MINUTES: CPT | Performed by: PSYCHOLOGIST

## 2019-07-23 NOTE — PROGRESS NOTES
Progress Note    Patient Name: Harrison Khan  Date: 7-23-19         Service Type: Individual  Video Visit: No     Session Start Time: 3:40  Session End Time: 4:20   Session Length: 38- 52 min    Session #: 3    Attendees: Client     Treatment Plan Last Reviewed: today  PHQ-9 / ELY-7 : see emr    DATA  Interactive Complexity: No  Crisis: No       Progress Since Last Session (Related to Symptoms / Goals / Homework):   Symptoms: stable    Homework: Completed in session      Episode of Care Goals: Satisfactory progress - ACTION (Actively working towards change); Intervened by reinforcing change plan / affirming steps taken     Current / Ongoing Stressors and Concerns:   Flashbacks, grief, anxiety      Treatment Objective(s) Addressed in This Session:   Teach mind body skills       Intervention:  Client reports using EFT successfully after an event.      Conducted thought chaining around 1 anxiety tracts.  Client able to come to the core emotions.  .  Reviewed resources that client has access to.  Peer support group.  Debriefing     Explored sleep hygiene and mind/ body techniques to reduce arousal levels  at bedtime.  Practiced a m/b technique.         ASSESSMENT: Current Emotional / Mental Status (status of significant symptoms):   Risk status (Self / Other harm or suicidal ideation)   Patient denies current fears or concerns for personal safety.   Patient denies current or recent suicidal ideation or behaviors.   Patientdenies current or recent homicidal ideation or behaviors.   Patient denies current or recent self injurious behavior or ideation.   Patient denies other safety concerns.   Patient Patient reports there has been no change in risk factors since their last session.     PatientPatient reports there has been no change in protective factors since their last session.     Recommended that patient call 911 or go to the local ED should there be a change in any of  "these risk factors.     Appearance:   Appropriate    Eye Contact:   Good    Psychomotor Behavior: Normal    Attitude:   Cooperative    Orientation:   All   Speech    Rate / Production: Normal     Volume:  Normal    Mood:    Anxious  Normal Sad    Affect:    Appropriate  Worrisome    Thought Content:  Clear    Thought Form:  Coherent  Logical    Insight:    Good      Medication Review:   No current psychiatric medications prescribed     Medication Compliance:   NA     Changes in Health Issues:   None reported     Chemical Use Review:   Substance Use: Chemical use reviewed, no active concerns identified      Tobacco Use: No current tobacco use.      Diagnosis:  1. PTSD (post-traumatic stress disorder)    2. ELY (generalized anxiety disorder)        Collateral Reports Completed:   Routed note to PCP as needed    PLAN: (Patient Tasks / Therapist Tasks / Other)  practice as mind body technique . Use in the pm.   Monitor blood sugar levels and breathing  Past hw  practice EFT        Gloria Zamora LP                                                         ______________________________________________________________________    Treatment Plan    Patient's Name: Harrison Khan  YOB: 1985      DSM5 Diagnoses: PTSD, ELY  Psychosocial / Contextual Factors:   WHODAS: see emr    Referral / Collaboration:  Referral to another professional/service is not indicated at this time..    Anticipated number of session or this episode of care: 20      MeasurableTreatment Goal(s) related to diagnosis / functional impairment(s)  Goal 1: Patient will report 50% reduced anxiety, reduced physio arousal levels.     I will know I've met my goal when\"  I can let go of thoughts and images in shorter amount of time.      Objective #A (Patient Action)    Patient will understand PTSD and learn 4 skills to assist wtih managing symptoms   Status:     Intervention(s)  Therapist will  Teach mind body , thought chaining , CBT " and coping skills to assist [facility] managing symptoms          Gloria Zamora, LP  July 2, 2019

## 2019-08-06 ENCOUNTER — MYC REFILL (OUTPATIENT)
Dept: FAMILY MEDICINE | Facility: CLINIC | Age: 34
End: 2019-08-06

## 2019-08-06 DIAGNOSIS — F41.9 ANXIETY: ICD-10-CM

## 2019-08-07 NOTE — TELEPHONE ENCOUNTER
Last Written Prescription Date:  5.31.19  Last Fill Quantity: 30,  # refills: 0   Last office visit: 6/3/2019 with prescribing provider:  Emmanuel   Future Office Visit:   Next 5 appointments (look out 90 days)    Sep 10, 2019  5:30 PM CDT  Return Visit with Gloria Zamora LP  [place] [id number] [facility] [id number] [phone]   Sep 24, 2019  5:30 PM CDT  Return Visit with Gloria Zamora LP  [address] [address]  [phone]           Requested Prescriptions   Pending Prescriptions Disp Refills     LORazepam (ATIVAN) 1 MG tablet 30 tablet 0     Sig: Take 1 tablet (1 mg) by mouth every 8 hours as needed for anxiety or sleep       There is no refill protocol information for this order        Routing refill request to provider for review/approval because:  Drug not on the [facility] refill protocol     Faith Bates RN, BS  Clinical Nurse Triage.

## 2019-08-08 RX ORDER — LORAZEPAM 1 MG/1
1 TABLET ORAL EVERY 8 HOURS PRN
Qty: 30 TABLET | Refills: 0 | Status: SHIPPED | OUTPATIENT
Start: 2019-08-08 | End: 2019-10-06

## 2019-08-20 ENCOUNTER — OFFICE VISIT (OUTPATIENT)
Dept: PSYCHOLOGY | Facility: CLINIC | Age: 34
End: 2019-08-20
Payer: COMMERCIAL

## 2019-08-20 DIAGNOSIS — F41.1 GAD (GENERALIZED ANXIETY DISORDER): ICD-10-CM

## 2019-08-20 DIAGNOSIS — F43.10 PTSD (POST-TRAUMATIC STRESS DISORDER): Primary | ICD-10-CM

## 2019-08-20 PROCEDURE — 90834 PSYTX W PT 45 MINUTES: CPT | Performed by: PSYCHOLOGIST
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
## 2019-09-09 ENCOUNTER — MYC REFILL (OUTPATIENT)
Dept: FAMILY MEDICINE | Facility: CLINIC | Age: 34
End: 2019-09-09

## 2019-09-09 DIAGNOSIS — F51.04 CHRONIC INSOMNIA: ICD-10-CM

## 2019-09-09 DIAGNOSIS — F41.9 ANXIETY: ICD-10-CM

## 2019-09-09 DIAGNOSIS — F43.10 PTSD (POST-TRAUMATIC STRESS DISORDER): ICD-10-CM

## 2019-09-09 NOTE — LETTER
[facility]  September 10, 2019      [place] [address]           Phone :  [phone]          Fax:  [phone]  Harrison Khan  [address] [facility] [address]      Dear Harrison,    We recently received a call from your pharmacy requesting a refill of your medication - Zoloft & Seroquel.    A review of your chart indicates that an appointment is required with your provider for your 3 month follow up. Please call the clinic at [phone] to schedule your appointment.    We have authorized one refill of your medication to allow time for you to schedule your appointment.    Taking care of your health is important to us, and ongoing visits with your provider are vital to your care.  We look forward to seeing you in the near future.        Sincerely,        Eliseo Benitez PA-C / Concha Vital RN

## 2019-09-10 DIAGNOSIS — F41.9 ANXIETY: ICD-10-CM

## 2019-09-10 DIAGNOSIS — F43.10 PTSD (POST-TRAUMATIC STRESS DISORDER): ICD-10-CM

## 2019-09-10 DIAGNOSIS — F51.04 CHRONIC INSOMNIA: ICD-10-CM

## 2019-09-10 RX ORDER — QUETIAPINE FUMARATE 25 MG/1
25 TABLET, FILM COATED ORAL AT BEDTIME
Qty: 30 TABLET | Refills: 0 | Status: SHIPPED | OUTPATIENT
Start: 2019-09-10 | End: 2019-09-20

## 2019-09-10 RX ORDER — QUETIAPINE FUMARATE 25 MG/1
TABLET, FILM COATED ORAL
Qty: 90 TABLET | Refills: 0 | OUTPATIENT
Start: 2019-09-10

## 2019-09-10 RX ORDER — SERTRALINE HYDROCHLORIDE 100 MG/1
100 TABLET, FILM COATED ORAL DAILY
Qty: 30 TABLET | Refills: 0 | Status: SHIPPED | OUTPATIENT
Start: 2019-09-10 | End: 2019-09-20

## 2019-09-10 RX ORDER — SERTRALINE HYDROCHLORIDE 100 MG/1
TABLET, FILM COATED ORAL
Qty: 90 TABLET | Refills: 0 | OUTPATIENT
Start: 2019-09-10

## 2019-09-10 NOTE — TELEPHONE ENCOUNTER
"Requested Prescriptions   Pending Prescriptions Disp Refills     sertraline (ZOLOFT) 100 MG tablet 90 tablet 0     Sig: Take 1 tablet (100 mg) by mouth daily   Last Written Prescription Date:  4/17/19  Last Fill Quantity: 90,  # refills: 0   Last Office Visit: 6/3/2019 [facility]      Return in about 3 months (around 9/3/2019) for Follow up, Med Check.     Future Office Visit:    Next 5 appointments (look out 90 days)    Sep 24, 2019  5:30 PM CDT  Return Visit with Gloria Zamora LP  [facility] [address] [facility] [address]  [phone]   Oct 29, 2019  4:30 PM CDT  Return Visit with Gloria Zamora LP  [place] [id number] [facility] [address]  [phone]             SSRIs Protocol Passed - 9/10/2019  8:46 AM   PHQ-9 SCORE 1/17/2019 4/17/2019 6/18/2019   PHQ-9 Total Score - - -   PHQ-9 Total Score 2 2 4     ELY-7 SCORE 1/17/2019 4/17/2019 6/18/2019   Total Score - - -   Total Score 2 14 8          Passed - Recent (12 mo) or future (30 days) visit within the authorizing provider's specialty     Patient had office visit in the last 12 months or has a visit in the next 30 days with authorizing provider or within the authorizing provider's specialty.  See \"Patient Info\" tab in inbasket, or \"Choose Columns\" in Meds & Orders section of the refill encounter.              Passed - Medication is active on med list        Passed - Patient is age 18 or older   ________________________________________________________________________       QUEtiapine (SEROQUEL) 25 MG tablet 90 tablet 0     Sig: Take 1 tablet (25 mg) by mouth At Bedtime   Last Written Prescription Date:  6/3/19  Last Fill Quantity: 90,  # refills: 0   Last Office Visit: 6/3/2019   Future Office Visit:    Next 5 appointments (look out 90 days)    Sep 24, 2019  5:30 PM CDT  Return Visit with Gloria Zamora LP  [facility] ([facility]) [address] [facility] [address]  [phone]   Oct 29, 2019  4:30 PM CDT  Return Visit with Gloria Zamora LP  [facility] ([facility]) [address] [facility] [address]  [phone]             Antipsychotic Medications Passed - 9/10/2019  8:46 AM        Passed - Blood pressure under 140/90 in past 12 months     BP Readings from Last 3 Encounters:   06/03/19 108/70   04/17/19 108/62   02/11/19 126/70                 Passed - Patient is 12 years of age or older        Passed - Lipid panel on file within the past 12 months     Recent Labs   Lab Test 10/17/18  1002   CHOL 200*   TRIG 292*   HDL 41   *   NHDL 159*               Passed - CBC on file in past 12 months     Recent Labs   Lab Test 10/17/18  1002   WBC 8.0   RBC 4.57   HGB 14.7   HCT 42.9                    Passed - Heart Rate on file within past 12 months     Pulse Readings from Last 3 Encounters:   06/03/19 60   04/17/19 60   02/11/19 70               Passed - A1c or Glucose on file in past 12 months     Recent Labs   Lab Test 10/17/18  1002 06/02/17  1607   GLC 73 95   A1C  --  4.9       Please review patients last 3 weights. If a weight gain of >10 lbs exists, you may refill the prescription once after instructing the patient to schedule an appointment within the next 30 days.    Wt Readings from Last 3 Encounters:   06/03/19 116.1 kg (256 lb)   04/17/19 114.8 kg (253 lb)   02/11/19 112.5 kg (248 lb)             Passed - Medication is active on med list        Passed - Recent (6 mo) or future (30 days) visit within the authorizing provider's specialty     Patient had office visit in the last 6 months or has a visit in the next 30 days with authorizing provider or within the authorizing provider's specialty.  See \"Patient Info\" tab in inbasket, or \"Choose Columns\" in Meds & Orders section of the refill encounter.            "

## 2019-09-10 NOTE — TELEPHONE ENCOUNTER
Medication is being filled for 1 time refill only due to:  Patient needs to be seen because patient is due for his 3 month follow up. Letter sent to patient.   Concha Vital RN

## 2019-09-17 DIAGNOSIS — F51.04 CHRONIC INSOMNIA: ICD-10-CM

## 2019-09-17 RX ORDER — ZALEPLON 5 MG/1
CAPSULE ORAL
Qty: 60 CAPSULE | Refills: 0 | Status: SHIPPED | OUTPATIENT
Start: 2019-09-17 | End: 2019-09-20

## 2019-09-17 NOTE — TELEPHONE ENCOUNTER
Routing refill request to provider for review/approval because:  Drug not on the [facility] refill protocol     Faith Bates RN, BS  Clinical Nurse Triage.

## 2019-09-17 NOTE — TELEPHONE ENCOUNTER
Requested Prescriptions   Pending Prescriptions Disp Refills     zaleplon (SONATA) 5 MG capsule [Pharmacy Med Name: ZALEPLON 5MG CAPSULES] 60 capsule 0     Sig: TAKE 2 CAPSULES BY MOUTH EVERY NIGHT AT BEDTIME   Last Written Prescription Date:  6/19/19  Last Fill Quantity: 60,  # refills: 2   Last Office Visit: 6/3/2019 [facility]     Return in about 3 months (around 9/3/2019) for Follow up, Med Check.     Future Office Visit:    Next 5 appointments (look out 90 days)    Sep 20, 2019  4:00 PM CDT  MyChart Short with Eliseo Benitez PA-C  [facility] [address] [place] [address], [facility] [address]  [facility] [address] [phone]   Sep 24, 2019  5:30 PM CDT  Return Visit with Gloria Zamora LP  [facility] ([address] [address] [phone]   Oct 29, 2019  4:30 PM CDT  Return Visit with Gloria Zamora LP  [facility] ([address] [address] [phone]             There is no refill protocol information for this order

## 2019-09-20 ENCOUNTER — OFFICE VISIT (OUTPATIENT)
Dept: FAMILY MEDICINE | Facility: CLINIC | Age: 34
End: 2019-09-20
Payer: COMMERCIAL

## 2019-09-20 VITALS
OXYGEN SATURATION: 99 % | RESPIRATION RATE: 16 BRPM | DIASTOLIC BLOOD PRESSURE: 70 MMHG | SYSTOLIC BLOOD PRESSURE: 110 MMHG | HEART RATE: 82 BPM | WEIGHT: 267 LBS | BODY MASS INDEX: 38.31 KG/M2 | TEMPERATURE: 98.8 F

## 2019-09-20 DIAGNOSIS — F41.9 ANXIETY: ICD-10-CM

## 2019-09-20 DIAGNOSIS — F43.10 PTSD (POST-TRAUMATIC STRESS DISORDER): ICD-10-CM

## 2019-09-20 DIAGNOSIS — F51.04 CHRONIC INSOMNIA: Primary | ICD-10-CM

## 2019-09-20 DIAGNOSIS — Z23 NEED FOR PROPHYLACTIC VACCINATION AND INOCULATION AGAINST INFLUENZA: ICD-10-CM

## 2019-09-20 PROCEDURE — 90686 IIV4 VACC NO PRSV 0.5 ML IM: CPT | Performed by: PHYSICIAN ASSISTANT

## 2019-09-20 PROCEDURE — 90471 IMMUNIZATION ADMIN: CPT | Performed by: PHYSICIAN ASSISTANT

## 2019-09-20 PROCEDURE — 99214 OFFICE O/P EST MOD 30 MIN: CPT | Mod: 25 | Performed by: PHYSICIAN ASSISTANT

## 2019-09-20 RX ORDER — QUETIAPINE FUMARATE 25 MG/1
25 TABLET, FILM COATED ORAL AT BEDTIME
Qty: 90 TABLET | Refills: 1 | Status: SHIPPED | OUTPATIENT
Start: 2019-09-20 | End: 2020-03-25

## 2019-09-20 RX ORDER — ZALEPLON 5 MG/1
CAPSULE ORAL
Qty: 60 CAPSULE | Refills: 3 | Status: SHIPPED | OUTPATIENT
Start: 2019-09-20 | End: 2020-06-11

## 2019-09-20 RX ORDER — SERTRALINE HYDROCHLORIDE 100 MG/1
100 TABLET, FILM COATED ORAL DAILY
Qty: 90 TABLET | Refills: 1 | Status: SHIPPED | OUTPATIENT
Start: 2019-09-20 | End: 2019-12-12

## 2019-09-23 ASSESSMENT — ANXIETY QUESTIONNAIRES
6. BECOMING EASILY ANNOYED OR IRRITABLE: SEVERAL DAYS
5. BEING SO RESTLESS THAT IT IS HARD TO SIT STILL: NOT AT ALL
1. FEELING NERVOUS, ANXIOUS, OR ON EDGE: NOT AT ALL
2. NOT BEING ABLE TO STOP OR CONTROL WORRYING: NOT AT ALL
GAD7 TOTAL SCORE: 2
3. WORRYING TOO MUCH ABOUT DIFFERENT THINGS: NOT AT ALL
7. FEELING AFRAID AS IF SOMETHING AWFUL MIGHT HAPPEN: NOT AT ALL

## 2019-09-23 ASSESSMENT — PATIENT HEALTH QUESTIONNAIRE - PHQ9
5. POOR APPETITE OR OVEREATING: SEVERAL DAYS
SUM OF ALL RESPONSES TO PHQ QUESTIONS 1-9: 2

## 2019-09-24 ASSESSMENT — ANXIETY QUESTIONNAIRES: GAD7 TOTAL SCORE: 2

## 2019-10-06 ENCOUNTER — MYC REFILL (OUTPATIENT)
Dept: FAMILY MEDICINE | Facility: CLINIC | Age: 34
End: 2019-10-06

## 2019-10-06 DIAGNOSIS — F41.9 ANXIETY: ICD-10-CM

## 2019-10-07 NOTE — TELEPHONE ENCOUNTER
Controlled Substance Refill Request for LORazepam (ATIVAN) 1 MG tablet  Problem List Complete:  No     PROVIDER TO CONSIDER COMPLETION OF PROBLEM LIST AND OVERVIEW/CONTROLLED SUBSTANCE AGREEMENT    Last Written Prescription Date:  08/08/19  Last Fill Quantity: 30,   # refills: 0    THE MOST RECENT OFFICE VISIT MUST BE WITHIN THE PAST 3 MONTHS. AT LEAST ONE FACE TO FACE VISIT MUST OCCUR EVERY 6 MONTHS. ADDITIONAL VISITS CAN BE VIRTUAL.  (THIS STATEMENT SHOULD BE DELETED.)    Last Office Visit with [facility] primary care provider: Eliseo Benitez    Future Office visit:   Next 5 appointments (look out 90 days)    Oct 29, 2019  4:30 PM CDT  Return Visit with Gloria Zamora LP  [facility] ([facility]) [address] [place] [address] [phone]          Controlled substance agreement:   Encounter-Level CSA:    There are no encounter-level csa.     Patient-Level CSA:    There are no patient-level csa.         Last Urine Drug Screen: No results found for: CDAUT, No results found for: COMDAT, No results found for: THC13, PCP13, COC13, MAMP13, OPI13, AMP13, BZO13, TCA13, MTD13, BAR13, OXY13, PPX13, BUP13     [facility]  [facility]     [email or web address]       checked in past 3 months?  No, route to RN

## 2019-10-08 NOTE — TELEPHONE ENCOUNTER
Last OV 9.20.19  Routing refill request to provider for review/approval because:  Drug not on the [facility] refill protocol

## 2019-10-09 RX ORDER — LORAZEPAM 1 MG/1
1 TABLET ORAL EVERY 8 HOURS PRN
Qty: 30 TABLET | Refills: 0 | Status: SHIPPED | OUTPATIENT
Start: 2019-10-09 | End: 2019-11-25

## 2019-10-14 DIAGNOSIS — F43.10 PTSD (POST-TRAUMATIC STRESS DISORDER): ICD-10-CM

## 2019-10-14 DIAGNOSIS — F41.9 ANXIETY: ICD-10-CM

## 2019-10-14 DIAGNOSIS — G47.00 INSOMNIA, UNSPECIFIED TYPE: ICD-10-CM

## 2019-10-14 DIAGNOSIS — F51.04 CHRONIC INSOMNIA: ICD-10-CM

## 2019-10-14 NOTE — TELEPHONE ENCOUNTER
"Requested Prescriptions   Pending Prescriptions Disp Refills     sertraline (ZOLOFT) 50 MG tablet [Pharmacy Med Name: SERTRALINE 50MG TABLETS] 30 tablet 0     Sig: TAKE ONE-HALF TABLET BY MOUTH DAILY FOR 1 TO 2 WEEKS, THEN INCREASE TO 1 TABLET BY MOUTH DAILY THEREAFTER.   Last Written Prescription Date:  9/20/19  Last Fill Quantity: 90,  # refills: 1   Last Office Visit: 9/20/2019 [facility]      Return in about 6 months (around 3/20/2020) for Med Check, Follow up.     Future Office Visit:    Next 5 appointments (look out 90 days)    Oct 29, 2019  4:30 PM CDT  Return Visit with Gloria Zamora LP  [facility] ([facility]) [address] [place] [address] [phone]             SSRIs Protocol Passed - 10/14/2019  9:33 AM   PHQ-9 SCORE 4/17/2019 6/18/2019 9/23/2019   PHQ-9 Total Score - - -   PHQ-9 Total Score 2 4 2     ELY-7 SCORE 4/17/2019 6/18/2019 9/23/2019   Total Score - - -   Total Score 14 8 2          Passed - Recent (12 mo) or future (30 days) visit within the authorizing provider's specialty     Patient has had an office visit with the authorizing provider or a provider within the authorizing providers department within the previous 12 mos or has a future within next 30 days. See \"Patient Info\" tab in inbasket, or \"Choose Columns\" in Meds & Orders section of the refill encounter.              Passed - Medication is active on med list        Passed - Patient is age 18 or older        "

## 2019-10-14 NOTE — TELEPHONE ENCOUNTER
"Requested Prescriptions   Pending Prescriptions Disp Refills   . mirtazapine (REMERON) 15 MG tablet [Pharmacy Med Name: MIRTAZAPINE 15MG  Last Written Prescription Date:  NA  Last Fill Quantity: NA,  # refills: NA   Last Office Visit: 9/20/2019   Future Office Visit:    Next 5 appointments (look out 90 days)    Oct 29, 2019  4:30 PM CDT  Return Visit with Gloria Zamora LP  [place] [address] [facility] [address] [phone]          TABLETS] 90 tablet 0     Sig: TAKE 1 TABLET(15 MG) BY MOUTH AT BEDTIME       Atypical Antidepressants Protocol Failed - 10/14/2019  3:25 AM        Failed - Medication active on med list        Passed - Recent (12 mo) or future (30 days) visit within the authorizing provider's specialty     Patient has had an office visit with the authorizing provider or a provider within the authorizing providers department within the previous 12 mos or has a future within next 30 days. See \"Patient Info\" tab in inbasket, or \"Choose Columns\" in Meds & Orders section of the refill encounter.              Passed - Patient is age 18 or older        prazosin (MINIPRESS) 1 MG capsule [Pharmacy Med Name: PRAZOSIN 1MG  Last Written Prescription Date:  na  Last Fill Quantity: na,  # refills: na   Last Office Visit: 9/20/2019   Future Office Visit:    Next 5 appointments (look out 90 days)    Oct 29, 2019  4:30 PM CDT  Return Visit with Gloria Zamora LP  [facility] [address] [facility] [address] [phone]          CAPSULES] 90 capsule 0     Sig: TAKE 1 CAPSULE(1 MG) BY MOUTH AT BEDTIME       Alpha Blockers Failed - 10/14/2019  3:25 AM        Failed - Medication is active on med list        Passed - Blood pressure under 140/90 in past 12 months     BP Readings from Last 3 Encounters:   09/20/19 110/70   06/03/19 108/70   04/17/19 108/62           Passed - Recent (12 mo) or future (30 " "days) visit within the authorizing provider's specialty     Patient has had an office visit with the authorizing provider or a provider within the authorizing providers department within the previous 12 mos or has a future within next 30 days. See \"Patient Info\" tab in inbasket, or \"Choose Columns\" in Meds & Orders section of the refill encounter.           Neither of these meds are on patients drug list.    "

## 2019-10-15 NOTE — TELEPHONE ENCOUNTER
"1.  OV notes from 6/3/2019 with Eliseo Benitez:    Says he tried the Prazosin again and \"fell out of bed\".  Likely an orthostatic BP drop.  Does not want to take this medication.    2.  Patient has not refilled Mirtazapine 15mg since 3/6/2019.  OV notes from 4/17/2019 with Eliseo Benitez:    Remeron-increases appetite and resulted in weight gain.    Assume patient is no longer taking these medications?    Concha Vital RN    "

## 2019-10-16 DIAGNOSIS — F43.10 PTSD (POST-TRAUMATIC STRESS DISORDER): ICD-10-CM

## 2019-10-16 DIAGNOSIS — F41.9 ANXIETY: ICD-10-CM

## 2019-10-16 RX ORDER — SERTRALINE HYDROCHLORIDE 100 MG/1
TABLET, FILM COATED ORAL
Qty: 30 TABLET | Refills: 0 | OUTPATIENT
Start: 2019-10-16

## 2019-10-16 NOTE — TELEPHONE ENCOUNTER
"Requested Prescriptions   Pending Prescriptions Disp Refills     sertraline (ZOLOFT) 100 MG tablet [Pharmacy Med Name: SERTRALINE 100MG TABLETS] 30 tablet 0     Sig: TAKE 1 TABLET(100 MG) BY MOUTH DAILY   Last Written Prescription Date:  9/20/19  Last Fill Quantity: 90,  # refills: 1   Last Office Visit: 9/20/2019 [facility]     Return in about 6 months (around 3/20/2020) for Med Check, Follow up.     Future Office Visit:    Next 5 appointments (look out 90 days)    Oct 29, 2019  4:30 PM CDT  Return Visit with Gloria Zamora LP  [facility] ([facility]) [address] [facility] [address] [phone]             SSRIs Protocol Passed - 10/16/2019  3:25 AM   PHQ-9 SCORE 4/17/2019 6/18/2019 9/23/2019   PHQ-9 Total Score - - -   PHQ-9 Total Score 2 4 2     ELY-7 SCORE 4/17/2019 6/18/2019 9/23/2019   Total Score - - -   Total Score 14 8 2          Passed - Recent (12 mo) or future (30 days) visit within the authorizing provider's specialty     Patient has had an office visit with the authorizing provider or a provider within the authorizing providers department within the previous 12 mos or has a future within next 30 days. See \"Patient Info\" tab in inbasket, or \"Choose Columns\" in Meds & Orders section of the refill encounter.              Passed - Medication is active on med list        Passed - Patient is age 18 or older        "

## 2019-10-16 NOTE — TELEPHONE ENCOUNTER
Kwaku is not on any of these medications.  At least I don't think.  And [place]  Where did these refills come from??

## 2019-10-20 RX ORDER — MIRTAZAPINE 15 MG/1
TABLET, FILM COATED ORAL
Qty: 30 TABLET | Refills: 0 | OUTPATIENT
Start: 2019-10-20

## 2019-10-20 RX ORDER — PRAZOSIN HYDROCHLORIDE 1 MG/1
CAPSULE ORAL
Qty: 90 CAPSULE | Refills: 0 | OUTPATIENT
Start: 2019-10-20

## 2019-10-20 RX ORDER — MIRTAZAPINE 15 MG/1
TABLET, FILM COATED ORAL
Qty: 90 TABLET | Refills: 0 | OUTPATIENT
Start: 2019-10-20

## 2019-10-21 NOTE — TELEPHONE ENCOUNTER
You are correct.  He never wants to take prazosin again :-) and is not currently taking mirtazapine.     Probably a pharmacy request?      Can we please tell them that he will not be needing these until further notice?      I denied the requests. Thanks.    kj

## 2019-10-29 ENCOUNTER — OFFICE VISIT (OUTPATIENT)
Dept: PSYCHOLOGY | Facility: CLINIC | Age: 34
End: 2019-10-29
Payer: COMMERCIAL

## 2019-10-29 DIAGNOSIS — F43.10 PTSD (POST-TRAUMATIC STRESS DISORDER): Primary | ICD-10-CM

## 2019-10-29 DIAGNOSIS — F41.1 GAD (GENERALIZED ANXIETY DISORDER): ICD-10-CM

## 2019-10-29 PROCEDURE — 90834 PSYTX W PT 45 MINUTES: CPT | Performed by: PSYCHOLOGIST

## 2019-10-29 NOTE — PROGRESS NOTES
Progress Note    Patient Name: Harrison Khan  Date: 10-29-19         Service Type: Individual  Video Visit: No     Session Start Time: 4:30  Session End Time: 5:20   Session Length: 38- 52 min    Session #: 5    Attendees: Client     Treatment Plan Last Reviewed: today (90 days = 2-2020)  PHQ-9 / ELY-7 : see emr  CGI= 4/4 on 6-18-19  ( logged on 6-20-19)  CGI 4/2 on 10-19-19    CSSI completed on 6-18-19      DATA  Interactive Complexity: No  Crisis: No       Progress Since Last Session (Related to Symptoms / Goals / Homework):   Symptoms: stable    Homework: Completed in session      Episode of Care Goals: Satisfactory progress - ACTION (Actively working towards change); Intervened by reinforcing change plan / affirming steps taken     Current / Ongoing Stressors and Concerns:    anxiety, insomnia     Treatment Objective(s) Addressed in This Session:   Review stress and symptoms       Intervention:  Client reports self care: exercise/  Has had a vacation- felt off duty and relaxed.    Looked a to the hx of insomia. Onset age 20   Has a past hx of an ADD dx- not sure what meds as a child.  Sleep apena ruled out . Remains on a sleep aid.    Is enjoying his new role at work. No flashbacks  But is on call at times.    Explored sleep hygeine. Has had some success with  A post work routine.  Generated additional self care options: acupuncture, electrolytes, cueing, schedule time off,  Next day off  If a number of sleepless nits occur. Has support from his boss.    Processed health  fears around not falling asleep.           ASSESSMENT: Current Emotional / Mental Status (status of significant symptoms):   Risk status (Self / Other harm or suicidal ideation)   Patient denies current fears or concerns for personal safety.   Patient denies current or recent suicidal ideation or behaviors.   Patientdenies current or recent homicidal ideation or behaviors.   Patient denies current  or recent self injurious behavior or ideation.   Patient denies other safety concerns.   Patient Patient reports there has been no change in risk factors since their last session.     PatientPatient reports there has been no change in protective factors since their last session.     Recommended that patient call 911 or go to the local ED should there be a change in any of these risk factors.     Appearance:   Appropriate    Eye Contact:   Good    Psychomotor Behavior: Normal    Attitude:   Cooperative    Orientation:   All   Speech    Rate / Production: Normal     Volume:  Normal    Mood:    Anxious  Normal Sad    Affect:    Appropriate  Worrisome    Thought Content:  Clear    Thought Form:  Coherent  Logical    Insight:    Good      Medication Review:   No current psychiatric medications prescribed     Medication Compliance:   NA     Changes in Health Issues:   None reported     Chemical Use Review:   Substance Use: Chemical use reviewed, no active concerns identified      Tobacco Use: No current tobacco use.      Diagnosis:  1. PTSD (post-traumatic stress disorder)    2. ELY (generalized anxiety disorder)        Collateral Reports Completed:   Routed note to PCP as needed    PLAN: (Patient Tasks / Therapist Tasks / Other)  Consider self care- acupuncture.  figure out thresholds around # of days of poor sleep and slowed cog performance  to make decison about self care  Past hw  Monitoring threshold of stress via changes in self care.  Reads about ACT fir next time  Client has access to: Peer support group.Debriefing       Past hw  practice as mind body technique . Use in the pm.   Monitor blood sugar levels and breathing  Past hw  practice EFT        Gloria Zamora LP                                                         ______________________________________________________________________    Treatment Plan    Patient's Name: Harrison Khan  YOB: 1985      DSM5 Diagnoses: PTSD, ELY  Psychosocial  "/ Contextual Factors:   WHODAS: see emr    Referral / Collaboration:  Referral to another professional/service is not indicated at this time..    Anticipated number of session or this episode of care: quarterly      MeasurableTreatment Goal(s) related to diagnosis / functional impairment(s)  Goal 1: Patient will report 50% reduced anxiety, reduced physio arousal levels.     I will know I've met my goal when\"  I can let go of thoughts and images in shorter amount of time.      Objective #A (Patient Action)    Patient will understand PTSD and learn 4 skills to assist wtih managing symptoms   Status:  Update 10-29-19 switched role- less triggering. 0 flashbacks.  Insomnia and busy mind at bedtime continues. Behavior plan is to do word find to distract, empty out thoughts on paper,   Consider acupuncture, electrolytes, cueing, schedule time off-Next day off  If a number of sleepless nits occur    Intervention(s)  Therapist will  Teach mind body , thought chaining , CBT and coping skills to assist wtih managing symptoms          Gloria Zamora LP    "

## 2019-11-25 ENCOUNTER — MYC MEDICAL ADVICE (OUTPATIENT)
Dept: FAMILY MEDICINE | Facility: CLINIC | Age: 34
End: 2019-11-25

## 2019-11-25 ENCOUNTER — MYC REFILL (OUTPATIENT)
Dept: FAMILY MEDICINE | Facility: CLINIC | Age: 34
End: 2019-11-25

## 2019-11-25 DIAGNOSIS — F41.9 ANXIETY: ICD-10-CM

## 2019-11-25 RX ORDER — LORAZEPAM 1 MG/1
1 TABLET ORAL EVERY 8 HOURS PRN
Qty: 30 TABLET | Refills: 0 | Status: SHIPPED | OUTPATIENT
Start: 2019-11-25 | End: 2020-04-17

## 2019-12-11 ENCOUNTER — MYC MEDICAL ADVICE (OUTPATIENT)
Dept: FAMILY MEDICINE | Facility: CLINIC | Age: 34
End: 2019-12-11

## 2019-12-12 ENCOUNTER — OFFICE VISIT (OUTPATIENT)
Dept: FAMILY MEDICINE | Facility: CLINIC | Age: 34
End: 2019-12-12
Payer: COMMERCIAL

## 2019-12-12 VITALS
WEIGHT: 285.5 LBS | HEART RATE: 76 BPM | SYSTOLIC BLOOD PRESSURE: 138 MMHG | TEMPERATURE: 97.9 F | RESPIRATION RATE: 16 BRPM | DIASTOLIC BLOOD PRESSURE: 88 MMHG | BODY MASS INDEX: 40.96 KG/M2

## 2019-12-12 DIAGNOSIS — J20.9 ACUTE BRONCHITIS WITH SYMPTOMS > 10 DAYS: ICD-10-CM

## 2019-12-12 DIAGNOSIS — F41.9 ANXIETY: Primary | ICD-10-CM

## 2019-12-12 PROCEDURE — 99214 OFFICE O/P EST MOD 30 MIN: CPT | Performed by: PHYSICIAN ASSISTANT

## 2019-12-12 RX ORDER — ALBUTEROL SULFATE 90 UG/1
2 AEROSOL, METERED RESPIRATORY (INHALATION) EVERY 6 HOURS PRN
Qty: 1 INHALER | Refills: 0 | Status: SHIPPED | OUTPATIENT
Start: 2019-12-12 | End: 2020-10-13

## 2019-12-12 RX ORDER — CITALOPRAM HYDROBROMIDE 10 MG/1
TABLET ORAL
Qty: 90 TABLET | Refills: 0 | Status: SHIPPED | OUTPATIENT
Start: 2019-12-12 | End: 2020-02-03

## 2019-12-12 RX ORDER — AZITHROMYCIN 250 MG/1
TABLET, FILM COATED ORAL
Qty: 6 TABLET | Refills: 0 | Status: SHIPPED | OUTPATIENT
Start: 2019-12-12 | End: 2020-06-11

## 2019-12-12 ASSESSMENT — PATIENT HEALTH QUESTIONNAIRE - PHQ9
10. IF YOU CHECKED OFF ANY PROBLEMS, HOW DIFFICULT HAVE THESE PROBLEMS MADE IT FOR YOU TO DO YOUR WORK, TAKE CARE OF THINGS AT HOME, OR GET ALONG WITH OTHER PEOPLE: SOMEWHAT DIFFICULT
SUM OF ALL RESPONSES TO PHQ QUESTIONS 1-9: 5
SUM OF ALL RESPONSES TO PHQ QUESTIONS 1-9: 5

## 2019-12-12 ASSESSMENT — ANXIETY QUESTIONNAIRES
6. BECOMING EASILY ANNOYED OR IRRITABLE: SEVERAL DAYS
GAD7 TOTAL SCORE: 7
2. NOT BEING ABLE TO STOP OR CONTROL WORRYING: SEVERAL DAYS
7. FEELING AFRAID AS IF SOMETHING AWFUL MIGHT HAPPEN: SEVERAL DAYS
5. BEING SO RESTLESS THAT IT IS HARD TO SIT STILL: SEVERAL DAYS
3. WORRYING TOO MUCH ABOUT DIFFERENT THINGS: SEVERAL DAYS
GAD7 TOTAL SCORE: 7
GAD7 TOTAL SCORE: 7
4. TROUBLE RELAXING: SEVERAL DAYS
1. FEELING NERVOUS, ANXIOUS, OR ON EDGE: SEVERAL DAYS
7. FEELING AFRAID AS IF SOMETHING AWFUL MIGHT HAPPEN: SEVERAL DAYS

## 2019-12-12 NOTE — NURSING NOTE
"Chief Complaint   Patient presents with     Recheck Medication     URI     Initial /88 (BP Location: Right arm, Patient Position: Sitting, Cuff Size: Adult Large)   Pulse 76   Temp 97.9  F (36.6  C) (Oral)   Resp 16   Wt 129.5 kg (285 lb 8 oz)   BMI 40.96 kg/m   Estimated body mass index is 40.96 kg/m  as calculated from the following:    Height as of 2/4/19: 1.778 m (5' 10\").    Weight as of this encounter: 129.5 kg (285 lb 8 oz).  BP completed using cuff size large right arm    Lisa Magill, CMA    "

## 2019-12-12 NOTE — PROGRESS NOTES
Subjective     Harrison Khan is a 34 year old male who presents to clinic today for the following health issues:    History of Present Illness        Mental Health Follow-up:  Patient presents to follow-up on Anxiety.    Patient's anxiety since last visit has been:  Medium  The patient is having other symptoms associated with anxiety.  Any significant life events: other  Patient is feeling anxious or having panic attacks.  Patient has no concerns about alcohol or drug use.     Social History  Tobacco Use    Smoking status: Former Smoker      Types: Cigarettes    Smokeless tobacco: Former User      Types: Chew      Quit date: 4/17/2018  Alcohol use: Yes    Alcohol/week: 0.0 standard drinks    Comment: occasional  Drug use: No      Today's PHQ-9         PHQ-9 Total Score:     (P) 5   PHQ-9 Q9 Thoughts of better off dead/self-harm past 2 weeks :   (P) Not at all   Thoughts of suicide or self harm:      Self-harm Plan:        Self-harm Action:          Safety concerns for self or others:           He eats 2-3 servings of fruits and vegetables daily.He consumes 0 sweetened beverage(s) daily.He is missing 2 dose(s) of medications per week.  He is not taking prescribed medications regularly due to side effects.    Kwaku is here to discuss his Zoloft he is taking for anxiety.  He has been on this for a while now but lately has been noticing weight gain, hot flashes, sexual side effects.    He recently became engaged to be .  Notes stress but hopefully good stress.      Has a cold right now.      Acute Illness   Acute illness concerns: cough and sinus   Onset: 1 week     Fever: no    Chills/Sweats: YES    Headache (location?): YES    Sinus Pressure:YES-Post nasal drainage     Conjunctivitis:  no    Ear Pain: YES: both-no pain, but feel plugged     Rhinorrhea: YES    Congestion: YES- chest     Sore Throat: YES     Cough: YES-productive of yellow sputum, productive of green sputum    Wheeze: YES    Decreased  Appetite: YES    Nausea: no    Vomiting: no    Diarrhea:  YES    Dysuria/Freq.: no    Fatigue/Achiness: YES    Sick/Strep Exposure: no     Therapies Tried and outcome: mucinex and nasocort nasospray and ibuprofen.        Reviewed and updated as needed this visit by Provider         Review of Systems   ROS COMP: Constitutional, HEENT, cardiovascular, pulmonary, gi and gu systems are negative, except as otherwise noted.      Objective    /88 (BP Location: Right arm, Patient Position: Sitting, Cuff Size: Adult Large)   Pulse 76   Temp 97.9  F (36.6  C) (Oral)   Resp 16   Wt 129.5 kg (285 lb 8 oz)   BMI 40.96 kg/m    Body mass index is 40.96 kg/m .  Physical Exam   GENERAL: healthy, alert and no distress  HENT: normal cephalic/atraumatic, ear canals and TM's normal, nose and mouth without ulcers or lesions, oropharynx clear, oral mucous membranes moist and sinuses: maxillary tenderness on both sides  NECK: no adenopathy, no asymmetry, masses, or scars and thyroid normal to palpation  RESP: lungs clear to auscultation - no rales, rhonchi or wheezes  SKIN: no suspicious lesions or rashes  PSYCH: mentation appears normal, affect normal/bright    Diagnostic Test Results:  none         Assessment & Plan     1. Anxiety  He has been on Celexa before-- would like to switch from zoloft.  Follow up one month, sooner prn.  - citalopram (CELEXA) 10 MG tablet; Start with one tab daily for 1 week then increase to two tabs. After 30 days can take 30mg (3 tabs).  Dispense: 90 tablet; Refill: 0    2. Acute bronchitis with symptoms > 10 days  Recommended supportive cares including warm salt water gargles, Tylenol/Ibuprofen as directed OTC, rest, humidifier.  Follow-up if symptoms are worsening or not improving as expected/discussed.    - VENTOLIN  (90 Base) MCG/ACT inhaler; Inhale 2 puffs into the lungs every 6 hours as needed for shortness of breath / dyspnea or wheezing  Dispense: 1 Inhaler; Refill: 0  - azithromycin  "(ZITHROMAX) 250 MG tablet; Take 2 tablets (500 mg) by mouth daily for 1 day, THEN 1 tablet (250 mg) daily for 4 days.  Dispense: 6 tablet; Refill: 0     BMI:   Estimated body mass index is 40.96 kg/m  as calculated from the following:    Height as of 2/4/19: 1.778 m (5' 10\").    Weight as of this encounter: 129.5 kg (285 lb 8 oz).   Weight management plan: Discussed healthy diet and exercise guidelines      Return in about 1 month (around 1/12/2020) for Follow up, Med Check- evisit is ok.    Eliseo Benitez PA-C  [facility]    "

## 2019-12-13 ASSESSMENT — ANXIETY QUESTIONNAIRES: GAD7 TOTAL SCORE: 7

## 2019-12-15 ENCOUNTER — HEALTH MAINTENANCE LETTER (OUTPATIENT)
Age: 34
End: 2019-12-15

## 2019-12-27 ENCOUNTER — MYC MEDICAL ADVICE (OUTPATIENT)
Dept: FAMILY MEDICINE | Facility: CLINIC | Age: 34
End: 2019-12-27

## 2020-01-03 DIAGNOSIS — J20.9 ACUTE BRONCHITIS WITH SYMPTOMS > 10 DAYS: ICD-10-CM

## 2020-01-04 RX ORDER — ALBUTEROL SULFATE 90 UG/1
AEROSOL, METERED RESPIRATORY (INHALATION)
Qty: 18 G | OUTPATIENT
Start: 2020-01-04

## 2020-01-04 NOTE — TELEPHONE ENCOUNTER
mychart sent to verify if pt used the entire inhaler that was sent on 12/12/19  Janusz Mays RN, BSN

## 2020-01-07 ENCOUNTER — OFFICE VISIT (OUTPATIENT)
Dept: PSYCHOLOGY | Facility: CLINIC | Age: 35
End: 2020-01-07
Payer: COMMERCIAL

## 2020-01-07 DIAGNOSIS — F41.1 GAD (GENERALIZED ANXIETY DISORDER): Primary | ICD-10-CM

## 2020-01-07 DIAGNOSIS — F43.10 PTSD (POST-TRAUMATIC STRESS DISORDER): ICD-10-CM

## 2020-01-07 PROCEDURE — 90834 PSYTX W PT 45 MINUTES: CPT | Performed by: PSYCHOLOGIST

## 2020-01-07 ASSESSMENT — ANXIETY QUESTIONNAIRES
2. NOT BEING ABLE TO STOP OR CONTROL WORRYING: NOT AT ALL
1. FEELING NERVOUS, ANXIOUS, OR ON EDGE: SEVERAL DAYS
7. FEELING AFRAID AS IF SOMETHING AWFUL MIGHT HAPPEN: NOT AT ALL
GAD7 TOTAL SCORE: 2
5. BEING SO RESTLESS THAT IT IS HARD TO SIT STILL: NOT AT ALL
3. WORRYING TOO MUCH ABOUT DIFFERENT THINGS: NOT AT ALL
6. BECOMING EASILY ANNOYED OR IRRITABLE: NOT AT ALL

## 2020-01-07 ASSESSMENT — PATIENT HEALTH QUESTIONNAIRE - PHQ9
SUM OF ALL RESPONSES TO PHQ QUESTIONS 1-9: 2
5. POOR APPETITE OR OVEREATING: SEVERAL DAYS

## 2020-01-07 NOTE — PROGRESS NOTES
"                                           Progress Note    Patient Name: Harrison Khan  Date: 1-07-20         Service Type: Individual  Video Visit: No     Session Start Time: 4:38  Session End Time: 5:25   Session Length: 38- 52 min    Session #: 6    Attendees: Client     Treatment Plan Last Reviewed: today (90 days = 2-2020)  PHQ-9 / ELY-7 : see emr  CGI= 4/4 on 6-18-19  ( logged on 6-20-19)  CGI 4/2 on 10-19-19  CGI = 4/2 on 1-07-20    CSSI completed on 6-18-19      DATA  Interactive Complexity: No  Crisis: No       Progress Since Last Session (Related to Symptoms / Goals / Homework):   Symptoms: stable    Homework: Completed in session      Episode of Care Goals: Satisfactory progress - ACTION (Actively working towards change); Intervened by reinforcing change plan / affirming steps taken     Current / Ongoing Stressors and Concerns:    anxiety, insomnia, over eating     Treatment Objective(s) Addressed in This Session:   Review stress, symptoms       Intervention:  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 Shares that he has worked   through it.    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   Has gained 30 pounds. ( some from his meds he thinks0    Getting  in 2020. 3 big planning details are completed.    Intermittent insomnia - can not fall asleep.  Using george chi to help fall asleep.  Reviewed patterns: realized tonight that he stopped  taking his antihistamine / correlated with getting a dog.    Explored food messages growing up: \"clean plate\"  Italian heritage- food in love.  Is bored at times.   Does share that he eats fast.  Explored action steps: chew fully, slow down,occupy time           ASSESSMENT: Current Emotional / Mental Status (status of significant symptoms):   Risk status (Self / Other harm or suicidal ideation)   Patient denies current fears or concerns for personal safety.   Patient denies current or recent suicidal ideation or behaviors.   Patientdenies " current or recent homicidal ideation or behaviors.   Patient denies current or recent self injurious behavior or ideation.   Patient denies other safety concerns.   Patient Patient reports there has been no change in risk factors since their last session.     PatientPatient reports there has been no change in protective factors since their last session.     Recommended that patient call 911 or go to the local ED should there be a change in any of these risk factors.     Appearance:   Appropriate    Eye Contact:   Good    Psychomotor Behavior: Normal    Attitude:   Cooperative    Orientation:   All   Speech    Rate / Production: Normal     Volume:  Normal    Mood:    Anxious  Normal Sad    Affect:    Appropriate  Worrisome    Thought Content:  Clear    Thought Form:  Coherent  Logical    Insight:    Good      Medication Review:   Med change - low to no side effects     Medication Compliance:   NA     Changes in Health Issues:   None reported     Chemical Use Review:   Substance Use: Chemical use reviewed, no active concerns identified      Tobacco Use: No current tobacco use.      Diagnosis:  1. ELY (generalized anxiety disorder)    2. PTSD (post-traumatic stress disorder)        Collateral Reports Completed:   Routed note to PCP as needed    PLAN: (Patient Tasks / Therapist Tasks / Other)  1) Explored action steps: chew fully, slow down,occupy time   2) Committing to the process.      Consider self care- acupuncture.  figure out thresholds around # of days of poor sleep and slowed cog performance  to make decison about self care  Past hw  Monitoring threshold of stress via changes in self care.  Reads about ACT fir next time  Client has access to: Peer support group.Debriefing       Past hw  practice as mind body technique . Use in the pm.   Monitor blood sugar levels and breathing  Past hw  practice EFT        Gloria Zamora LP                                                      "    ______________________________________________________________________    Treatment Plan    Patient's Name: Harrison Khan  YOB: 1985      DSM5 Diagnoses: PTSD, ELY  Psychosocial / Contextual Factors:   WHODAS: see emr    Referral / Collaboration:  Referral to another professional/service is not indicated at this time..    Anticipated number of session or this episode of care: quarterly      MeasurableTreatment Goal(s) related to diagnosis / functional impairment(s)  Goal 1: Patient will report 50% reduced anxiety, reduced physio arousal levels.     I will know I've met my goal when\"  I can let go of thoughts and images in shorter amount of time.      Objective #A (Patient Action)    Patient will understand PTSD and learn 4 skills to assist wtih managing symptoms   Status:  Update 10-29-19 switched role- less triggering. 0 flashbacks.  Insomnia and busy mind at bedtime continues. Behavior plan is to do word find to distract, empty out thoughts on paper,   Consider acupuncture, electrolytes, cueing, schedule time off-Next day off  If a number of sleepless nits occur    Intervention(s)  Therapist will  Teach mind body , thought chaining , CBT and coping skills to assist wtih managing symptoms          Gloria Zamora LP    "

## 2020-01-08 ASSESSMENT — ANXIETY QUESTIONNAIRES: GAD7 TOTAL SCORE: 2

## 2020-01-17 ENCOUNTER — E-VISIT (OUTPATIENT)
Dept: FAMILY MEDICINE | Facility: CLINIC | Age: 35
End: 2020-01-17
Payer: COMMERCIAL

## 2020-01-17 DIAGNOSIS — J30.81 ALLERGIC RHINITIS DUE TO ANIMALS: Primary | ICD-10-CM

## 2020-01-17 PROCEDURE — 99421 OL DIG E/M SVC 5-10 MIN: CPT | Performed by: FAMILY MEDICINE

## 2020-01-17 RX ORDER — MOMETASONE FUROATE MONOHYDRATE 50 UG/1
2 SPRAY, METERED NASAL DAILY
Qty: 1 BOX | Refills: 11 | Status: SHIPPED | OUTPATIENT
Start: 2020-01-17 | End: 2020-10-13

## 2020-01-21 ENCOUNTER — TELEPHONE (OUTPATIENT)
Dept: FAMILY MEDICINE | Facility: CLINIC | Age: 35
End: 2020-01-21

## 2020-01-21 DIAGNOSIS — J30.81 ALLERGIC RHINITIS DUE TO ANIMALS: Primary | ICD-10-CM

## 2020-01-21 NOTE — TELEPHONE ENCOUNTER
Prior Authorization Retail Medication Request    Medication/Dose: mometasone (NASONEX) 50 MCG/ACT nasal spray  ICD code (if different than what is on RX):    Previously Tried and Failed:  FLONASE and NASACORT   Rationale:  Pt is taking zyrtec, not effective enough.    Insurance Name:  Unknown  Insurance ID:  [id number]      Pharmacy Information (if different than what is on RX)  Name:    Phone:

## 2020-01-23 NOTE — TELEPHONE ENCOUNTER
Central Prior Authorization Team  Phone: [phone]    PA Initiation    Medication: mometasone (NASONEX) 50 MCG/ACT nasal spray  Insurance Company: [employer] - Phone [phone] Fax [phone]  Pharmacy Filling the Rx: [id number] [place], [place] - [address] [place] [facility] [place] [address]  Filling Pharmacy Phone: [phone]  Filling Pharmacy Fax:    Start Date: 1/23/2020

## 2020-01-26 DIAGNOSIS — F41.9 ANXIETY: ICD-10-CM

## 2020-01-27 NOTE — TELEPHONE ENCOUNTER
PRIOR AUTHORIZATION DENIED    Medication: mometasone (NASONEX) 50 MCG/ACT nasal spray- DENIED     Denial Date: 1/27/2020    Denial Rational: Patient must have a history of trial & failure to ALL formulary alternatives or have a contraindication or intolerance to the formulary alternatives:    - flunisolide nasal, triamcinolone nasal spray, and fluticasone nasal spray        Appeal Information: If provider would like to appeal please provide a letter of medical necessity.

## 2020-01-27 NOTE — TELEPHONE ENCOUNTER
PHQ-9 SCORE 9/23/2019 12/12/2019 1/7/2020   PHQ-9 Total Score - - -   PHQ-9 Total Score MyChart - 5 (Mild depression) -   PHQ-9 Total Score 2 5 2     ELY-7 SCORE 9/23/2019 12/12/2019 1/7/2020   Total Score - - -   Total Score - 7 (mild anxiety) -   Total Score 2 7 2     MyChart message sent to patient to see how he is doing on this medication and how much of this medication he is currently taking.    Concha Vital RN

## 2020-01-28 RX ORDER — TRIAMCINOLONE ACETONIDE 55 UG/1
2 SPRAY, METERED NASAL DAILY
Qty: 1 BOTTLE | Refills: 11 | Status: SHIPPED | OUTPATIENT
Start: 2020-01-28 | End: 2020-06-11

## 2020-01-28 NOTE — TELEPHONE ENCOUNTER
Return MyChart message from patient:    I have tried all of the nasal sprays and I am open to try which ever might be covered by insurance.     Concha Vital RN

## 2020-01-28 NOTE — TELEPHONE ENCOUNTER
Do we know what else he has tried?  Flonase is OTC as is Nasacort.  I like Nasacort.  That one tends to work well and be less irritating.    Eliseo

## 2020-01-29 NOTE — TELEPHONE ENCOUNTER
Sig: Start with one tab daily for 1 week then increase to two tabs. After 30 days can take 30mg (3 tabs).  Is he taking 1, 2 or 3 tabs?

## 2020-02-03 ENCOUNTER — TELEPHONE (OUTPATIENT)
Dept: FAMILY MEDICINE | Facility: CLINIC | Age: 35
End: 2020-02-03

## 2020-02-03 RX ORDER — CITALOPRAM HYDROBROMIDE 10 MG/1
TABLET ORAL
Qty: 60 TABLET | Refills: 3 | Status: SHIPPED | OUTPATIENT
Start: 2020-02-03 | End: 2020-06-26

## 2020-02-03 NOTE — TELEPHONE ENCOUNTER
2/3/20    Pt called needing a refill on Rx Celexa. Pt stated that he has 1 pill left    Pt contact [phone] ok to     Pharmacy: [facility] #[id number] [place] [address]    citalopram (CELEXA) 10 MG tablet  0 ordered         Start: 12/12/2019  Ord/Sold: 12/12/2019 (O)  Report  Adh:   Taking:   Long-term:   Pharmacy: [facility] #[id number] [place] [address] [address] [place] [facility] [place] [address]  Med Dose History  Change       Summary: Start with one tab daily for 1 week then increase to two tabs. After 30 days can take 30mg (3 tabs)., Disp-90 tablet, R-0, E-Prescribe       Patient Sig: Start with one tab daily for 1 week then increase to two tabs. After 30 days can take 30mg (3 tabs).       Ordered on: 12/12/2019       Authorized by: FATUMA MENDOZA       Dispense: 90 tablet       Admin Instructions: Start with one tab daily for 1 week then increase to two tabs. After 30 days can take 30mg (3 tabs).

## 2020-02-03 NOTE — TELEPHONE ENCOUNTER
I think I replied to this last week, but I noticed the rx has not be refilled. I only have one pill left. I am currently taking 2, 10 mg pills daily of the Celexa and I am doing well on it.     Thank you,     Kwaku      Refilled for 2 tabs a day, still has option to increase to 3 tabs if needed with current directions    Prescription approved per [facility] [place]  Faith Bates RN BS

## 2020-03-03 ENCOUNTER — MYC REFILL (OUTPATIENT)
Dept: FAMILY MEDICINE | Facility: CLINIC | Age: 35
End: 2020-03-03

## 2020-03-03 DIAGNOSIS — F51.04 CHRONIC INSOMNIA: ICD-10-CM

## 2020-03-04 NOTE — TELEPHONE ENCOUNTER
Routing refill request to provider for review/approval because:  Drug not on the [facility] refill protocol     Concha Vital RN

## 2020-03-05 RX ORDER — ZALEPLON 5 MG/1
CAPSULE ORAL
Qty: 90 CAPSULE | Refills: 3 | Status: SHIPPED | OUTPATIENT
Start: 2020-03-05 | End: 2020-06-22

## 2020-03-25 ENCOUNTER — MYC REFILL (OUTPATIENT)
Dept: FAMILY MEDICINE | Facility: CLINIC | Age: 35
End: 2020-03-25

## 2020-03-25 DIAGNOSIS — F51.04 CHRONIC INSOMNIA: ICD-10-CM

## 2020-03-26 RX ORDER — QUETIAPINE FUMARATE 25 MG/1
25 TABLET, FILM COATED ORAL AT BEDTIME
Qty: 90 TABLET | Refills: 1 | Status: SHIPPED | OUTPATIENT
Start: 2020-03-26 | End: 2020-09-08

## 2020-03-26 NOTE — TELEPHONE ENCOUNTER
Routing refill request to provider for review/approval because:  Labs not current:  See protocol    Nelda Wu, RN

## 2020-04-07 DIAGNOSIS — F51.04 CHRONIC INSOMNIA: ICD-10-CM

## 2020-04-07 RX ORDER — QUETIAPINE FUMARATE 25 MG/1
TABLET, FILM COATED ORAL
Qty: 90 TABLET | Refills: 1 | OUTPATIENT
Start: 2020-04-07

## 2020-04-07 NOTE — TELEPHONE ENCOUNTER
Should have refills on file E-Prescribing Status: Receipt confirmed by pharmacy (3/26/2020  3:09 PM CDT)

## 2020-04-17 ENCOUNTER — MYC REFILL (OUTPATIENT)
Dept: FAMILY MEDICINE | Facility: CLINIC | Age: 35
End: 2020-04-17

## 2020-04-17 DIAGNOSIS — F41.9 ANXIETY: ICD-10-CM

## 2020-04-17 RX ORDER — LORAZEPAM 1 MG/1
1 TABLET ORAL EVERY 8 HOURS PRN
Qty: 30 TABLET | Refills: 0 | Status: SHIPPED | OUTPATIENT
Start: 2020-04-17 | End: 2020-06-16

## 2020-04-17 NOTE — TELEPHONE ENCOUNTER
LORazepam (ATIVAN) 1 MG tablet       Last Written Prescription Date:  11/25/2019  Last Fill Quantity: 30,   # refills: 0  Last Office Visit: 12/12/2019  Future Office visit:       Routing refill request to provider for review/approval because:  Drug not on the [facility], [facility] or [facility] refill protocol or controlled substance    Concha Vital RN

## 2020-04-27 ENCOUNTER — VIRTUAL VISIT (OUTPATIENT)
Dept: PSYCHOLOGY | Facility: CLINIC | Age: 35
End: 2020-04-27
Payer: COMMERCIAL

## 2020-04-27 DIAGNOSIS — F41.1 GAD (GENERALIZED ANXIETY DISORDER): Primary | ICD-10-CM

## 2020-04-27 DIAGNOSIS — F43.10 PTSD (POST-TRAUMATIC STRESS DISORDER): ICD-10-CM

## 2020-04-27 PROCEDURE — 90834 PSYTX W PT 45 MINUTES: CPT | Mod: GT | Performed by: PSYCHOLOGIST

## 2020-04-27 NOTE — PROGRESS NOTES
Progress Note    Patient Name: Harrison Khan  Date: 4-         Service Type: Individual  Video Visit: yes - doxy.    Telemedicine Visit: The patient's condition can be safely assessed and treated via synchronous audio and visual telemedicine encounter.       Reason for Telemedicine Visit:  telehealth visit due to covid-19     Originating Site (Patient Location): Patient's home     Distant Site (Provider Location): Provider Remote Setting     Consent:  The patient/guardian has verbally consented to: the potential risks and benefits of telemedicine (video visit) versus in person care; bill my insurance or make self-payment for services provided; and responsibility for payment of non-covered services.      Mode of Communication:  Video Conference via doxy     As the provider I attest to compliance with applicable laws and regulations related to telemedicine.           Session Start Time: 4:38  Session End Time: 5:28   Session Length: 45+ minutes    Session #: 2 in 2020,    7 overall    Attendees: Client     Treatment Plan Last Reviewed: today (90 days = 7-2020)  PHQ-9 / ELY-7 : see emr  CGI= 4/4 on 6-18-19  ( logged on 6-20-19)  CGI 4/2 on 10-19-19  CGI = 4/2 on 1-07-20  CGI= 4/4 on 4-    CSSI completed on 6-18-19      DATA  Interactive Complexity: No  Crisis: No      Diagnosis:  ELY, PTSD       Progress Since Last Session (Related to Symptoms / Goals / Homework):   Symptoms: stable    Homework: Completed in session      Episode of Care Goals: Satisfactory progress - ACTION (Actively working towards change); Intervened by reinforcing change plan / affirming steps taken     Current / Ongoing Stressors and Concerns:    anxiety, insomnia, intrusive images     Treatment Objective(s) Addressed in This Session:   Review stressors, symptoms and skills used       Intervention:  Client verifies that she is in a private space to ensure confidentiality  and consents to  this visit.  There are no concerns about medication refills.  They feel safe in their neighborhood.   Reports 0 SI/SIB.             Client reports that he had been doing well   until he came upon a tragic accident for a youth- in his work role.  Now is having intrusive images and concerns about the family.  Identified and processed emotions.    Constructed and practiced EFT scripts to assist with managing images.             ASSESSMENT: Current Emotional / Mental Status (status of significant symptoms):   Risk status (Self / Other harm or suicidal ideation)   Patient denies current fears or concerns for personal safety.   Patient denies current or recent suicidal ideation or behaviors.   Patientdenies current or recent homicidal ideation or behaviors.   Patient denies current or recent self injurious behavior or ideation.   Patient denies other safety concerns.   Patient Patient reports there has been no change in risk factors since their last session.     PatientPatient reports there has been no change in protective factors since their last session.     Recommended that patient call 911 or go to the local ED should there be a change in any of these risk factors.     Appearance:   Appropriate    Eye Contact:   Good    Psychomotor Behavior: Normal    Attitude:   Cooperative    Orientation:   All   Speech    Rate / Production: Normal     Volume:  Normal    Mood:    Anxious  Normal Sad    Affect:    Appropriate  Worrisome    Thought Content:  Clear    Thought Form:  Coherent  Logical    Insight:    Good      Medication Review:   Med change - low to no side effects     Medication Compliance:   NA     Changes in Health Issues:   None reported     Chemical Use Review:   Substance Use: Chemical use reviewed, no active concerns identified      Tobacco Use: No current tobacco use.        Collateral Reports Completed:   Routed note to PCP as needed    PLAN: (Patient Tasks / Therapist Tasks / Other)  Practice EFT and deep  "breathing   Consider a small personal  memorial to honor the life lost.        Gloria Zamora, LP                                                         ______________________________________________________________________    Treatment Plan    Patient's Name: Harrison Khan  YOB: 1985      DSM5 Diagnoses: PTSD, ELY  Psychosocial / Contextual Factors:   WHODAS: see emr    Referral / Collaboration:  Referral to another professional/service is not indicated at this time..    Anticipated number of session or this episode of care: quarterly or sooner as needed      MeasurableTreatment Goal(s) related to diagnosis / functional impairment(s)  Goal 1: Patient will report 50% reduced anxiety, reduced physio arousal levels.     I will know I've met my goal when\"  I can let go of thoughts and images in shorter amount of time.      Objective #A (Patient Action)    Patient will understand PTSD and learn 4 skills to assist wtih managing symptoms       Status:  Update 4- plan for the next 90 days us to:  Practice EFT and deep breathing   Consider a small personal  memorial to honor the life lost.  Engage in healthy distractions ,  light hearted activities, begin to enjoy their  future  again. watching sports,  Music, reading. Tune into circles where he can feel connected.  Re-invest in these tasks;   Consider self care- acupuncture.  figure out thresholds around # of days of poor sleep and slowed cog performance  Increase daily  self care- nutrition / exercise.  Monitoring threshold of stress via changes in self care.  Reads about ACT pick 2 skills and gain consistency,  Access peer support group and or debriefing   Practice as mind body technique . Use in the pm.   Monitor blood sugar levels and breathing        Status:  Update 10-29-19 switched role- less triggering. 0 flashbacks.  Insomnia and busy mind at bedtime continues. Behavior plan is to do word find to distract, empty out " thoughts on paper,   Consider acupuncture, electrolytes, cueing, schedule time off-Next day off  If a number of sleepless nits occur    Intervention(s)  Therapist will  Teach mind body , thought chaining , CBT and coping skills to assist wtih managing symptoms          Gloria Zamora LP

## 2020-04-28 ASSESSMENT — ANXIETY QUESTIONNAIRES
1. FEELING NERVOUS, ANXIOUS, OR ON EDGE: SEVERAL DAYS
GAD7 TOTAL SCORE: 3
2. NOT BEING ABLE TO STOP OR CONTROL WORRYING: SEVERAL DAYS
3. WORRYING TOO MUCH ABOUT DIFFERENT THINGS: NOT AT ALL
7. FEELING AFRAID AS IF SOMETHING AWFUL MIGHT HAPPEN: NOT AT ALL
5. BEING SO RESTLESS THAT IT IS HARD TO SIT STILL: NOT AT ALL
IF YOU CHECKED OFF ANY PROBLEMS ON THIS QUESTIONNAIRE, HOW DIFFICULT HAVE THESE PROBLEMS MADE IT FOR YOU TO DO YOUR WORK, TAKE CARE OF THINGS AT HOME, OR GET ALONG WITH OTHER PEOPLE: SOMEWHAT DIFFICULT
6. BECOMING EASILY ANNOYED OR IRRITABLE: NOT AT ALL

## 2020-04-28 ASSESSMENT — PATIENT HEALTH QUESTIONNAIRE - PHQ9
SUM OF ALL RESPONSES TO PHQ QUESTIONS 1-9: 5
5. POOR APPETITE OR OVEREATING: SEVERAL DAYS

## 2020-04-28 ASSESSMENT — COLUMBIA-SUICIDE SEVERITY RATING SCALE - C-SSRS
2. HAVE YOU ACTUALLY HAD ANY THOUGHTS OF KILLING YOURSELF LIFETIME?: NO
2. HAVE YOU ACTUALLY HAD ANY THOUGHTS OF KILLING YOURSELF?: NO
1. IN THE PAST MONTH, HAVE YOU WISHED YOU WERE DEAD OR WISHED YOU COULD GO TO SLEEP AND NOT WAKE UP?: NO
1. IN THE PAST MONTH, HAVE YOU WISHED YOU WERE DEAD OR WISHED YOU COULD GO TO SLEEP AND NOT WAKE UP?: NO

## 2020-04-29 ASSESSMENT — ANXIETY QUESTIONNAIRES: GAD7 TOTAL SCORE: 3

## 2020-05-07 ENCOUNTER — VIRTUAL VISIT (OUTPATIENT)
Dept: PSYCHOLOGY | Facility: CLINIC | Age: 35
End: 2020-05-07
Payer: COMMERCIAL

## 2020-05-07 DIAGNOSIS — F41.1 GAD (GENERALIZED ANXIETY DISORDER): Primary | ICD-10-CM

## 2020-05-07 DIAGNOSIS — F43.10 PTSD (POST-TRAUMATIC STRESS DISORDER): ICD-10-CM

## 2020-05-07 PROCEDURE — 90834 PSYTX W PT 45 MINUTES: CPT | Mod: GT | Performed by: PSYCHOLOGIST

## 2020-05-07 NOTE — PROGRESS NOTES
Progress Note    Patient Name: Harrison Khan  Date :  5-         Service Type: Individual  Video Visit: yes - doxy.    Telemedicine Visit: The patient's condition can be safely assessed and treated via synchronous audio and visual telemedicine encounter.       Reason for Telemedicine Visit:  telehealth visit due to covid-19     Originating Site (Patient Location): Patient's home     Distant Site (Provider Location): Provider Remote Setting     Consent:  The patient/guardian has verbally consented to: the potential risks and benefits of telemedicine (video visit) versus in person care; bill my insurance or make self-payment for services provided; and responsibility for payment of non-covered services.      Mode of Communication:  Video Conference via doxy     As the provider I attest to compliance with applicable laws and regulations related to telemedicine.           Session Start Time: 3:05  Session End Time: 3:55   Session Length: 50 minutes    Session #: 3 in 2020,    8 overall    Attendees: Client     Treatment Plan Last Reviewed: today (90 days = 7-2020)  PHQ-9 / ELY-7 : see emr  CGI= 4/4 on 6-18-19  ( logged on 6-20-19)  CGI 4/2 on 10-19-19  CGI = 4/2 on 1-07-20  CGI= 4/4 on 4-    CSSI completed on 6-18-19      DATA  Interactive Complexity: No  Crisis: No      Diagnosis:  ELY, PTSD       Progress Since Last Session (Related to Symptoms / Goals / Homework):   Symptoms: stable    Homework: Completed in session      Episode of Care Goals: Satisfactory progress - ACTION (Actively working towards change); Intervened by reinforcing change plan / affirming steps taken     Current / Ongoing Stressors and Concerns:    anxiety, insomnia, intrusive images     Treatment Objective(s) Addressed in This Session:   Review stressors, symptoms and skills used       Intervention:  Client verifies that she is in a private space to ensure confidentiality  and consents  to this visit.  There are no concerns about medication refills.  They feel safe in their neighborhood.   Reports 0 SI/SIB.     Client reports that he practice EFT  tapping.  Has less intrusive images.  Did plant a small herb garden to honor the youth.    Self -care: Intermediate fasting and is attending to nutrition.  Has trouble falling asleep.( since age  20) Is on Seroquel and Sonota .  Uses a magnesium supplement- discussed Kaylen Wallace as a resource.    Has been feeling out of sort- explored unwanted changes:  Trip cancelled.  Wedding planned for Oct 16th in [place] - not sure if it will happen.  Explored options for improving mood..      Identified and processed emotions.            ASSESSMENT: Current Emotional / Mental Status (status of significant symptoms):   Risk status (Self / Other harm or suicidal ideation)   Patient denies current fears or concerns for personal safety.   Patient denies current or recent suicidal ideation or behaviors.   Patientdenies current or recent homicidal ideation or behaviors.   Patient denies current or recent self injurious behavior or ideation.   Patient denies other safety concerns.   Patient Patient reports there has been no change in risk factors since their last session.     PatientPatient reports there has been no change in protective factors since their last session.     Recommended that patient call 911 or go to the local ED should there be a change in any of these risk factors.     Appearance:   Appropriate    Eye Contact:   Good    Psychomotor Behavior: Normal    Attitude:   Cooperative    Orientation:   All   Speech    Rate / Production: Normal     Volume:  Normal    Mood:    Anxious  Normal Sad    Affect:    Appropriate  Worrisome    Thought Content:  Clear    Thought Form:  Coherent  Logical    Insight:    Good      Medication Review:   Med change - low to no side effects     Medication Compliance:   NA     Changes in Health Issues:   None reported     Chemical  "Use Review:   Substance Use: Chemical use reviewed, no active concerns identified      Tobacco Use: No current tobacco use.        Collateral Reports Completed:   Routed note to PCP as needed    PLAN: (Patient Tasks / Therapist Tasks / Other)  [place] spinning class /weight lifting for stress management and improving mood  Past hw  Practice EFT and deep breathing   Consider a small personal  memorial to honor the life lost.        Gloria Zamora, LP                                                         ______________________________________________________________________    Treatment Plan    Patient's Name: Harrison Khan  YOB: 1985      DSM5 Diagnoses: PTSD, ELY  Psychosocial / Contextual Factors:   WHODAS: see emr    Referral / Collaboration:  Referral to another professional/service is not indicated at this time..    Anticipated number of session or this episode of care: quarterly or sooner as needed      MeasurableTreatment Goal(s) related to diagnosis / functional impairment(s)  Goal 1: Patient will report 50% reduced anxiety, reduced physio arousal levels.     I will know I've met my goal when\"  I can let go of thoughts and images in shorter amount of time.      Objective #A (Patient Action)    Patient will understand PTSD and learn 4 skills to assist wtih managing symptoms       Status:  Update 4- plan for the next 90 days us to:  Practice EFT and deep breathing   Consider a small personal  memorial to honor the life lost.  Engage in healthy distractions ,  light hearted activities, begin to enjoy their  future  again. watching sports,  Music, reading. Tune into circles where he can feel connected.  Re-invest in these tasks;   Consider self care- acupuncture.  figure out thresholds around # of days of poor sleep and slowed cog performance  Increase daily  self care- nutrition / exercise.  Monitoring threshold of stress via changes in self care.  Reads about ACT pick 2 " skills and gain consistency,  Access peer support group and or debriefing   Practice as mind body technique . Use in the pm.   Monitor blood sugar levels and breathing        Status:  Update 10-29-19 switched role- less triggering. 0 flashbacks.  Insomnia and busy mind at bedtime continues. Behavior plan is to do word find to distract, empty out thoughts on paper,   Consider acupuncture, electrolytes, cueing, schedule time off-Next day off  If a number of sleepless nits occur    Intervention(s)  Therapist will  Teach mind body , thought chaining , CBT and coping skills to assist [facility] managing symptoms          Gloria Zamora LP

## 2020-06-11 ENCOUNTER — OFFICE VISIT (OUTPATIENT)
Dept: FAMILY MEDICINE | Facility: CLINIC | Age: 35
End: 2020-06-11
Payer: COMMERCIAL

## 2020-06-11 VITALS
WEIGHT: 273 LBS | SYSTOLIC BLOOD PRESSURE: 136 MMHG | TEMPERATURE: 98.8 F | HEIGHT: 70 IN | DIASTOLIC BLOOD PRESSURE: 81 MMHG | BODY MASS INDEX: 39.08 KG/M2 | HEART RATE: 71 BPM

## 2020-06-11 DIAGNOSIS — R19.5 LOOSE STOOLS: Primary | ICD-10-CM

## 2020-06-11 PROCEDURE — 87177 OVA AND PARASITES SMEARS: CPT | Performed by: FAMILY MEDICINE

## 2020-06-11 PROCEDURE — 99214 OFFICE O/P EST MOD 30 MIN: CPT | Performed by: FAMILY MEDICINE

## 2020-06-11 PROCEDURE — 87493 C DIFF AMPLIFIED PROBE: CPT | Performed by: FAMILY MEDICINE

## 2020-06-11 PROCEDURE — 87209 SMEAR COMPLEX STAIN: CPT | Performed by: FAMILY MEDICINE

## 2020-06-11 RX ORDER — CIPROFLOXACIN 500 MG/1
500 TABLET, FILM COATED ORAL 2 TIMES DAILY
Qty: 6 TABLET | Refills: 0 | Status: SHIPPED | OUTPATIENT
Start: 2020-06-11 | End: 2020-06-14

## 2020-06-11 ASSESSMENT — MIFFLIN-ST. JEOR: SCORE: 2186.63

## 2020-06-11 NOTE — PROGRESS NOTES
Subjective     Harrison Khan is a 34 year old male who presents to clinic today for the following health issues:    HPI     Diarrhea x 2 weeks    Same today as it was 2 weeks ago.   Cramping before stooling, then subsides.   Has had 5 episodes already today.  Yellow stool, foul odor.  No mucous or blood.    No new meds, supplements.   No new foods.   No recent travel.     No history of similar.   No GI issues in family.       Patient Active Problem List   Diagnosis     CARDIOVASCULAR SCREENING; LDL GOAL LESS THAN 160     GERD (gastroesophageal reflux disease)     Moderate major depression (H)     Generalized anxiety disorder     Health Care Home     Panic disorder without agoraphobia     Chronic insomnia     Hemiplegic migraine     Obesity     Morbid obesity (H)     Past Surgical History:   Procedure Laterality Date     ARTHROSCOPY SHOULDER RT/LT      RT x 2       Social History     Tobacco Use     Smoking status: Former Smoker     Types: Cigarettes     Smokeless tobacco: Former User     Types: Chew     Quit date: 4/17/2018   Substance Use Topics     Alcohol use: Yes     Alcohol/week: 0.0 standard drinks     Comment: occasional     Family History   Problem Relation Age of Onset     Hypertension Mother      Hypertension Father      Cardiovascular Maternal Grandmother      Cardiovascular Maternal Grandfather      Cardiovascular Paternal Grandmother      Cardiovascular Paternal Grandfather          Current Outpatient Medications   Medication Sig Dispense Refill     ciprofloxacin (CIPRO) 500 MG tablet Take 1 tablet (500 mg) by mouth 2 times daily for 3 days 6 tablet 0     citalopram (CELEXA) 10 MG tablet START WITH 1 TABLET BY MOUTH DAILY FOR 1 WEEK, THEN INCREASE TO 2 TABLETS BY MOUTH DAILY. AFTER 30 DAYS, CAN INCREASE TO 3 TABLETS DAILY. 60 tablet 3     LORazepam (ATIVAN) 1 MG tablet Take 1 tablet (1 mg) by mouth every 8 hours as needed for anxiety or sleep 30 tablet 0     mometasone (NASONEX) 50 MCG/ACT nasal  "spray Spray 2 sprays into both nostrils daily 1 Box 11     nicotine polacrilex (NICORETTE) 2 MG gum Place 1 each (2 mg) inside cheek as needed for smoking cessation 100 tablet 1     QUEtiapine (SEROQUEL) 25 MG tablet Take 1 tablet (25 mg) by mouth At Bedtime 90 tablet 1     VENTOLIN  (90 Base) MCG/ACT inhaler Inhale 2 puffs into the lungs every 6 hours as needed for shortness of breath / dyspnea or wheezing 1 Inhaler 0     zaleplon (SONATA) 5 MG capsule Take two capsules at bedtime qhs.  Patient can take a third tablet 4 hours later if he wakes. 90 capsule 3       Reviewed and updated as needed this visit by Provider         Review of Systems   Constitutional, HEENT, cardiovascular, pulmonary, gi and gu systems are negative, except as otherwise noted.      Objective    /81 (BP Location: Right arm, Patient Position: Sitting, Cuff Size: Adult Large)   Pulse 71   Temp 98.8  F (37.1  C) (Oral)   Ht 1.765 m (5' 9.5\")   Wt 123.8 kg (273 lb)   BMI 39.74 kg/m    Body mass index is 39.74 kg/m .  Physical Exam   GENERAL: healthy, alert and no distress  RESP: lungs clear to auscultation - no rales, rhonchi or wheezes  CV: regular rate and rhythm, normal S1 S2, no S3 or S4, no murmur, click or rub  ABDOMEN: soft, mildly tender in the LLQ, no hepatosplenomegaly, no masses and bowel sounds normal    Diagnostic Test Results:  none         Assessment & Plan     1. Loose stools - discussed symptoms most c/w infection, will test given 2 weeks of symptoms with no improvement. He agrees. Advised if pain worsens or becomes more consistent, needs to be seen urgently.   - Clostridium difficile Toxin B PCR; Future  - Enteric Bacteria and Virus Panel by EMELINA Stool; Future  - Ova and Parasite Exam Routine; Future  - Fecal Lactoferrin; Future  - ciprofloxacin (CIPRO) 500 MG tablet; Take 1 tablet (500 mg) by mouth 2 times daily for 3 days  Dispense: 6 tablet; Refill: 0       Return in about 2 weeks (around 6/25/2020) for if " symptoms fail to improve or worsen.    Georgiana Chapman MD  [facility]

## 2020-06-12 DIAGNOSIS — R19.5 LOOSE STOOLS: ICD-10-CM

## 2020-06-12 LAB
C COLI+JEJUNI+LARI FUSA STL QL NAA+PROBE: ABNORMAL
C DIFF TOX B STL QL: NEGATIVE
EC STX1 GENE STL QL NAA+PROBE: ABNORMAL
EC STX2 GENE STL QL NAA+PROBE: ABNORMAL
ENTERIC PATHOGEN COMMENT: ABNORMAL
LACTOFERRIN STL QL IA: NORMAL
NOROV GI+II ORF1-ORF2 JNC STL QL NAA+PR: ABNORMAL
RVA NSP5 STL QL NAA+PROBE: ABNORMAL
SALMONELLA SP RPOD STL QL NAA+PROBE: ABNORMAL
SHIGELLA SP+EIEC IPAH STL QL NAA+PROBE: ABNORMAL
SPECIMEN SOURCE: NORMAL
V CHOL+PARA RFBL+TRKH+TNAA STL QL NAA+PR: ABNORMAL
Y ENTERO RECN STL QL NAA+PROBE: ABNORMAL

## 2020-06-15 ENCOUNTER — VIRTUAL VISIT (OUTPATIENT)
Dept: PSYCHOLOGY | Facility: CLINIC | Age: 35
End: 2020-06-15
Payer: COMMERCIAL

## 2020-06-15 DIAGNOSIS — F43.10 PTSD (POST-TRAUMATIC STRESS DISORDER): ICD-10-CM

## 2020-06-15 DIAGNOSIS — F41.1 GAD (GENERALIZED ANXIETY DISORDER): Primary | ICD-10-CM

## 2020-06-15 LAB
O+P STL MICRO: NORMAL
O+P STL MICRO: NORMAL
SPECIMEN SOURCE: NORMAL

## 2020-06-15 PROCEDURE — 90834 PSYTX W PT 45 MINUTES: CPT | Mod: GT | Performed by: PSYCHOLOGIST

## 2020-06-15 NOTE — PROGRESS NOTES
Progress Note    Patient Name: Harrison Khan  Date :  6-         Service Type: Individual Video Visit - doxy.    Telemedicine Visit: The patient's condition can be safely assessed and treated via synchronous audio and visual telemedicine encounter.       Reason for Telemedicine Visit:  telehealth visit due to covid-19     Originating Site (Patient Location): Patient's home     Distant Site (Provider Location): Provider Remote Setting     Consent:  The patient/guardian has verbally consented to: the potential risks and benefits of telemedicine (video visit) versus in person care; bill my insurance or make self-payment for services provided; and responsibility for payment of non-covered services.      Mode of Communication:  Video Conference via doxy     As the provider I attest to compliance with applicable laws and regulations related to telemedicine.           Session Start Time: 12:35  Session End Time: 1:25   Session Length: 45 minutes    Session #: 4 in 2020,    9 overall    Attendees: Client     Treatment Plan Last Reviewed: today (90 days = 7-2020)  PHQ-9 / ELY-7 : see emr  CGI= 4/4 on 6-18-19  ( logged on 6-20-19)  CGI 4/2 on 10-19-19  CGI = 4/2 on 1-07-20  CGI= 4/4 on 4-    CSSI completed on 6-18-19 ( not showing in EMR will investigate- Epic software [employer])      DATA  Interactive Complexity: No  Crisis: No      Diagnosis:  ELY, PTSD       Progress Since Last Session (Related to Symptoms / Goals / Homework):   Symptoms: stable    Homework: Completed in session      Episode of Care Goals: Satisfactory progress - ACTION (Actively working towards change); Intervened by reinforcing change plan / affirming steps taken     Current / Ongoing Stressors and Concerns:   Work stress      Treatment Objective(s) Addressed in This Session:   Support.Review stressors, symptoms and skills used       Intervention:  Client verifies that she is in a private space  to ensure confidentiality  and consents to this visit.  There are no concerns about medication refills.  They feel safe in their neighborhood.   Reports 0 SI/SIB.     Client reports that he no longer has intrusive images  from a work related duty. 9 worked through in previous sessions)    Self -care: limiting media viewing.  Attending a listening session at work.  Time with family, and dog. Exercise.    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  Has a few moments of wondering if he should continue in this line of work.    Identified and processed emotions.            ASSESSMENT: Current Emotional / Mental Status (status of significant symptoms):   Risk status (Self / Other harm or suicidal ideation)   Patient denies current fears or concerns for personal safety.   Patient denies current or recent suicidal ideation or behaviors.   Patientdenies current or recent homicidal ideation or behaviors.   Patient denies current or recent self injurious behavior or ideation.   Patient denies other safety concerns.   Patient Patient reports there has been no change in risk factors since their last session.     PatientPatient reports there has been no change in protective factors since their last session.     Recommended that patient call 911 or go to the local ED should there be a change in any of these risk factors.     Appearance:   Appropriate    Eye Contact:   Good    Psychomotor Behavior: Normal    Attitude:   Cooperative    Orientation:   All   Speech    Rate / Production: Normal     Volume:  Normal    Mood:    Anxious  Normal Sad    Affect:    Appropriate  Worrisome    Thought Content:  Clear    Thought Form:  Coherent  Logical    Insight:    Good      Medication Review:   Med change - low to no side effects     Medication Compliance:   NA     Changes in Health Issues:   None reported     Chemical Use Review:   Substance Use: Chemical use  "reviewed, no active concerns identified      Tobacco Use: No current tobacco use.        Collateral Reports Completed:   Routed note to PCP as needed    PLAN: (Patient Tasks / Therapist Tasks / Other)  Use of ongoing boundaries wHen people want to talk about current event  Past hw  [place] spinning class /weight lifting for stress management and improving mood  Past hw  Practice EFT and deep breathing   Consider a small personal  memorial to honor the life lost.        Gloria Zamora, LP                                                         ______________________________________________________________________    Treatment Plan    Patient's Name: Harrison Khan  YOB: 1985      DSM5 Diagnoses: PTSD, ELY  Psychosocial / Contextual Factors:   WHODAS: see emr    Referral / Collaboration:  Referral to another professional/service is not indicated at this time..    Anticipated number of session or this episode of care: quarterly or sooner as needed      MeasurableTreatment Goal(s) related to diagnosis / functional impairment(s)  Goal 1: Patient will report 50% reduced anxiety, reduced physio arousal levels.     I will know I've met my goal when\"  I can let go of thoughts and images in shorter amount of time.      Objective #A (Patient Action)    Patient will understand PTSD and learn 4 skills to assist wtih managing symptoms       Status:  Update 4- plan for the next 90 days us to:  Practice EFT and deep breathing   Consider a small personal  memorial to honor the life lost.  Engage in healthy distractions ,  light hearted activities, begin to enjoy their  future  again. watching sports,  Music, reading. Tune into circles where he can feel connected.  Re-invest in these tasks;   Consider self care- acupuncture.  figure out thresholds around # of days of poor sleep and slowed cog performance  Increase daily  self care- nutrition / exercise.  Monitoring threshold of stress via " changes in self care.  Reads about ACT pick 2 skills and gain consistency,  Access peer support group and or debriefing   Practice as mind body technique . Use in the pm.   Monitor blood sugar levels and breathing        Status:  Update 10-29-19 switched role- less triggering. 0 flashbacks.  Insomnia and busy mind at bedtime continues. Behavior plan is to do word find to distract, empty out thoughts on paper,   Consider acupuncture, electrolytes, cueing, schedule time off-Next day off  If a number of sleepless nits occur    Intervention(s)  Therapist will  Teach mind body , thought chaining , CBT and coping skills to assist [facility] managing symptoms          Gloria Zamora LP

## 2020-06-16 ENCOUNTER — MYC REFILL (OUTPATIENT)
Dept: FAMILY MEDICINE | Facility: CLINIC | Age: 35
End: 2020-06-16

## 2020-06-16 DIAGNOSIS — F41.9 ANXIETY: ICD-10-CM

## 2020-06-18 RX ORDER — LORAZEPAM 1 MG/1
1 TABLET ORAL EVERY 8 HOURS PRN
Qty: 30 TABLET | Refills: 0 | Status: SHIPPED | OUTPATIENT
Start: 2020-06-18 | End: 2020-09-18

## 2020-06-22 ENCOUNTER — MYC REFILL (OUTPATIENT)
Dept: FAMILY MEDICINE | Facility: CLINIC | Age: 35
End: 2020-06-22

## 2020-06-22 DIAGNOSIS — F51.04 CHRONIC INSOMNIA: ICD-10-CM

## 2020-06-24 RX ORDER — ZALEPLON 5 MG/1
CAPSULE ORAL
Qty: 90 CAPSULE | Refills: 3 | Status: SHIPPED | OUTPATIENT
Start: 2020-06-24 | End: 2020-10-11

## 2020-07-13 ENCOUNTER — VIRTUAL VISIT (OUTPATIENT)
Dept: PSYCHOLOGY | Facility: CLINIC | Age: 35
End: 2020-07-13
Payer: COMMERCIAL

## 2020-07-13 DIAGNOSIS — F41.1 GAD (GENERALIZED ANXIETY DISORDER): Primary | ICD-10-CM

## 2020-07-13 DIAGNOSIS — F43.10 PTSD (POST-TRAUMATIC STRESS DISORDER): ICD-10-CM

## 2020-07-13 PROCEDURE — 90834 PSYTX W PT 45 MINUTES: CPT | Mod: GT | Performed by: PSYCHOLOGIST

## 2020-07-13 NOTE — PROGRESS NOTES
Progress Note    Patient Name: Harrison Khan  Date :  7-         Service Type: Individual Video Visit - doxy.    Telemedicine Visit: The patient's condition can be safely assessed and treated via synchronous audio and visual telemedicine encounter.       Reason for Telemedicine Visit:  telehealth visit due to covid-19     Originating Site (Patient Location): Patient's home     Distant Site (Provider Location): Provider Remote Setting     Consent:  The patient/guardian has verbally consented to: the potential risks and benefits of telemedicine (video visit) versus in person care; bill my insurance or make self-payment for services provided; and responsibility for payment of non-covered services.      Mode of Communication:  Video Conference via doxy     As the provider I attest to compliance with applicable laws and regulations related to telemedicine.           Session Start Time: 12:35  Session End Time: 1:25   Session Length: 45 minutes    Session #: 5 in 2020,    10 overall    Attendees: Client     Treatment Plan Last Reviewed: today (90 days = 7-)  PHQ-9 / ELY-7 : see emr  CGI= 4/4 on 6-18-19  ( logged on 6-20-19)  CGI 4/2 on 10-19-19  CGI = 4/2 on 1-07-20  CGI= 4/4 on 4-    CSSI completed on 6-18-19 ( not showing in EMR - Epic known software [employer])      DATA  Interactive Complexity: No  Crisis: No      Diagnosis:  ELY, PTSD       Progress Since Last Session (Related to Symptoms / Goals / Homework):   Symptoms: stable    Homework: Completed in session      Episode of Care Goals: Satisfactory progress - ACTION (Actively working towards change); Intervened by reinforcing change plan / affirming steps taken     Current / Ongoing Stressors and Concerns:   Interpersonal distress - wedding plans   Work stress      Treatment Objective(s) Addressed in This Session:   Support.  ACT  Review stressors, symptoms and skills  used       Intervention:  Client verifies that she is in a private space to ensure confidentiality  and consents to this visit.  There are no concerns about medication refills.  They feel safe in their neighborhood.   Reports 0 SI/SIB.     Client reports that poor sleep and increased anxiety.  As his weeding nears, has 130 responses from guests   but only 40 are now allowed at the venue.  Sees his mother stressed as well.    Work stress and the social climate are ever present.  Tries to stay off social media, tries to limit exposure to news.    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  Explored using ACT.    Identified and processed emotions.            ASSESSMENT: Current Emotional / Mental Status (status of significant symptoms):   Risk status (Self / Other harm or suicidal ideation)   Patient denies current fears or concerns for personal safety.   Patient denies current or recent suicidal ideation or behaviors.   Patientdenies current or recent homicidal ideation or behaviors.   Patient denies current or recent self injurious behavior or ideation.   Patient denies other safety concerns.   Patient Patient reports there has been no change in risk factors since their last session.     PatientPatient reports there has been no change in protective factors since their last session.     Recommended that patient call 911 or go to the local ED should there be a change in any of these risk factors.     Appearance:   Appropriate    Eye Contact:   Good    Psychomotor Behavior: Normal    Attitude:   Cooperative    Orientation:   All   Speech    Rate / Production: Normal     Volume:  Normal    Mood:    Anxious  Normal Sad    Affect:    Appropriate  Worrisome    Thought Content:  Clear    Thought Form:  Coherent  Logical    Insight:    Good      Medication Review:   Med change - low to no side effects     Medication Compliance:   NA     Changes in Health Issues:   None reported     Chemical Use  "Review:   Substance Use: Chemical use reviewed, no active concerns identified      Tobacco Use: No current tobacco use.        Collateral Reports Completed:   Routed note to PCP as needed    PLAN: (Patient Tasks / Therapist Tasks / Other)  Review session note around values and contribution.  Past hw  Use of ongoing boundaries wHen people want to talk about current event  Past hw  [place] spinning class /weight lifting for stress management and improving mood  Past hw  Practice EFT and deep breathing   Consider a small personal  memorial to honor the life lost.        Gloria Zamora, HILDA                                                         ______________________________________________________________________    Treatment Plan    Patient's Name: Harrison Khan  YOB: 1985      DSM5 Diagnoses: PTSD, ELY  Psychosocial / Contextual Factors:   WHODAS: see emr    Referral / Collaboration:  Referral to another professional/service is not indicated at this time..    Anticipated number of session or this episode of care: quarterly or sooner as needed      MeasurableTreatment Goal(s) related to diagnosis / functional impairment(s)  Goal 1: Patient will report 50% reduced anxiety, reduced physio arousal levels.     I will know I've met my goal when\"  I can let go of thoughts and images in shorter amount of time.      Objective #A (Patient Action)    Patient will understand PTSD and learn 4 skills to assist wtih managing symptoms       Status:  Update 4- plan for the next 90 days us to:  Practice EFT and deep breathing   Consider a small personal  memorial to honor the life lost.  Engage in healthy distractions ,  light hearted activities, begin to enjoy their  future  again. watching sports,  Music, reading. Tune into circles where he can feel connected.  Re-invest in these tasks;   Consider self care- acupuncture.  figure out thresholds around # of days of poor sleep and slowed cog " performance  Increase daily  self care- nutrition / exercise.  Monitoring threshold of stress via changes in self care.  Reads about ACT pick 2 skills and gain consistency,  Access peer support group and or debriefing   Practice as mind body technique . Use in the pm.   Monitor blood sugar levels and breathing        Status:  Update 10-29-19 switched role- less triggering. 0 flashbacks.  Insomnia and busy mind at bedtime continues. Behavior plan is to do word find to distract, empty out thoughts on paper,   Consider acupuncture, electrolytes, cueing, schedule time off-Next day off  If a number of sleepless nits occur    Intervention(s)  Therapist will  Teach mind body , thought chaining , CBT and coping skills to assist [facility] managing symptoms          Gloria Zamora, LP

## 2020-09-08 ENCOUNTER — VIRTUAL VISIT (OUTPATIENT)
Dept: FAMILY MEDICINE | Facility: CLINIC | Age: 35
End: 2020-09-08
Payer: COMMERCIAL

## 2020-09-08 DIAGNOSIS — F51.04 CHRONIC INSOMNIA: Primary | ICD-10-CM

## 2020-09-08 PROCEDURE — 99213 OFFICE O/P EST LOW 20 MIN: CPT | Mod: 95 | Performed by: PHYSICIAN ASSISTANT

## 2020-09-08 RX ORDER — QUETIAPINE FUMARATE 50 MG/1
50 TABLET, FILM COATED ORAL AT BEDTIME
Qty: 90 TABLET | Refills: 1 | Status: SHIPPED | OUTPATIENT
Start: 2020-09-08 | End: 2021-02-17

## 2020-09-08 NOTE — PROGRESS NOTES
"Harrison Khan is a 35 year old male who is being evaluated via a billable video visit.      The patient has been notified of following:     \"This video visit will be conducted via a call between you and your physician/provider. We have found that certain health care needs can be provided without the need for an in-person physical exam.  This service lets us provide the care you need with a video conversation.  If a prescription is necessary we can send it directly to your pharmacy.  If lab work is needed we can place an order for that and you can then stop by our lab to have the test done at a later time.    Video visits are billed at different rates depending on your insurance coverage.  Please reach out to your insurance provider with any questions.    If during the course of the call the physician/provider feels a video visit is not appropriate, you will not be charged for this service.\"    Patient has given verbal consent for Video visit? Yes  How would you like to obtain your AVS? MyChart  If you are dropped from the video visit, the video invite should be resent to: Text to cell phone: [phone]  Will anyone else be joining your video visit? No    Subjective     Harrison Khan is a 35 year old male who presents today via video visit for the following health issues:    History of Present Illness        He eats 0-1 servings of fruits and vegetables daily.He consumes 1 sweetened beverage(s) daily.He exercises with enough effort to increase his heart rate 60 or more minutes per day.  He exercises with enough effort to increase his heart rate 5 days per week.   He is taking medications regularly.      Medication Followup of Sonata, Seroquel    Taking Medication as prescribed: yes    Side Effects:  Get tired    Medication Helping Symptoms:  Yes and NO, getting MIDnight snack attacks     Kwaku is calling in today via video call to discuss sleep.  The last month things are getting worse again.  He is still " taking Sonata as prescribed and also Seroquel once at night but he is having trouble falling asleep and once he does he still wakes on many nights and has trouble falling back to sleep.  Things are going well overall.  He and his fiance are still planning a wedding.  COVID has made this more stressful but it is on for Oct.     PTSD is doing well.  Not having night terrors anymore.  Seeing Gloria B monthly which he enjoys and finds helpful.  Still taking Citalopram- two tablets daily (Rx in chart says three).    Video Start Time: 2:39 PM      Review of Systems   Constitutional, HEENT, cardiovascular, pulmonary, gi and gu systems are negative, except as otherwise noted.      Objective           Vitals:  No vitals were obtained today due to virtual visit.    Physical Exam     GENERAL: Healthy, alert and no distress  EYES: Eyes grossly normal to inspection.  No discharge or erythema, or obvious scleral/conjunctival abnormalities.  RESP: No audible wheeze, cough, or visible cyanosis.  No visible retractions or increased work of breathing.    SKIN: Visible skin clear. No significant rash, abnormal pigmentation or lesions.  NEURO: Cranial nerves grossly intact.  Mentation and speech appropriate for age.  PSYCH: Mentation appears normal, affect normal/bright, judgement and insight intact, normal speech and appearance well-groomed.            Assessment & Plan     Chronic insomnia  For now we decided to increase Seroquel to 50mg at night along with the same Sonata dosing.  Continue with citalopram and therapy with Gloria.  In 2 weeks or so, if still not sleeping well he can follow up with [employer] message and we can discuss.  If we need more discussion he should schedule another video call.  - QUEtiapine (SEROQUEL) 50 MG tablet; Take 1 tablet (50 mg) by mouth At Bedtime      Return in about 2 weeks (around 9/22/2020) for sleep- , virtual ok, evisit ok.    Eliseo Benitez PA-C  [facility]  [facility]      Video-Visit Details    Type of service:  Video Visit    Video End Time:3:00PM    Originating Location (pt. Location): Home    Distant Location (provider location):  [facility]     Platform used for Video Visit: Khari

## 2020-09-10 ENCOUNTER — VIRTUAL VISIT (OUTPATIENT)
Dept: PSYCHOLOGY | Facility: CLINIC | Age: 35
End: 2020-09-10
Payer: COMMERCIAL

## 2020-09-10 DIAGNOSIS — F41.1 GAD (GENERALIZED ANXIETY DISORDER): Primary | ICD-10-CM

## 2020-09-10 PROCEDURE — 90834 PSYTX W PT 45 MINUTES: CPT | Mod: GT | Performed by: PSYCHOLOGIST

## 2020-09-10 NOTE — PROGRESS NOTES
Progress Note    Patient Name: Harrison Khan  Date :  9-         Service Type: Individual Video Visit - doxy.    Telemedicine Visit: The patient's condition can be safely assessed and treated via synchronous audio and visual telemedicine encounter.       Reason for Telemedicine Visit:  telehealth visit due to covid-19     Originating Site (Patient Location): Patient's home     Distant Site (Provider Location): Provider Remote Setting     Consent:  The patient/guardian has verbally consented to: the potential risks and benefits of telemedicine (video visit) versus in person care; bill my insurance or make self-payment for services provided; and responsibility for payment of non-covered services.      Mode of Communication:  Video Conference via doxy     As the provider I attest to compliance with applicable laws and regulations related to telemedicine.           Session Start Time: 4:00  Session End Time: 4:50   Session Length: 45 minutes    Session #: 7 in 2020,    12 overall    Attendees: Client     Treatment Plan Last Reviewed: today (90 days = 10-)  PHQ-9 / ELY-7 : see emr  CGI= 4/4 on 6-18-19  ( logged on 6-20-19)  CGI 4/2 on 10-19-19  CGI = 4/2 on 1-07-20  CGI= 4/4 on 4-  CGI= 3/2 on 8-    CSSI completed on 6-18-19 ( not showing in EMR - Epic known software [employer]ango)      DATA  Interactive Complexity: No  Crisis: No      Diagnosis:  ELY, PTSD       Progress Since Last Session (Related to Symptoms / Goals / Homework):   Symptoms: stable    Homework: Completed in session      Episode of Care Goals: Satisfactory progress - ACTION (Actively working towards change); Intervened by reinforcing change plan / affirming steps taken     Current / Ongoing Stressors and Concerns:   Sleep disruption   Interpersonal    Work stress      Treatment Objective(s) Addressed in This Session:   Support.  ACT  Review stressors, symptoms and skills  used       Intervention:  Client verifies that he is in a private space to ensure confidentiality  and consents to this visit.  There are no concerns about medication refills.  They feel safe in their neighborhood.   Reports 0 SI/SIB.     Client reports that he has tried some things  to be able to improve sleep.  Not much change.    Reviewed daily stressors.  Has been managing okay.   Has some stress with the social climate   and the public views of law enforcement.  Explored strategies.    Identified and processed emotions.            ASSESSMENT: Current Emotional / Mental Status (status of significant symptoms):   Risk status (Self / Other harm or suicidal ideation)   Patient denies current fears or concerns for personal safety.   Patient denies current or recent suicidal ideation or behaviors.   Patientdenies current or recent homicidal ideation or behaviors.   Patient denies current or recent self injurious behavior or ideation.   Patient denies other safety concerns.   Patient Patient reports there has been no change in risk factors since their last session.     PatientPatient reports there has been no change in protective factors since their last session.     Recommended that patient call 911 or go to the local ED should there be a change in any of these risk factors.     Appearance:   Appropriate    Eye Contact:   Good    Psychomotor Behavior: Normal    Attitude:   Cooperative    Orientation:   All   Speech    Rate / Production: Normal     Volume:  Normal    Mood:    Anxious  Normal Sad    Affect:    Appropriate  Worrisome    Thought Content:  Clear    Thought Form:  Coherent  Logical    Insight:    Good      Medication Review:   Med change - low to no side effects     Medication Compliance:   NA     Changes in Health Issues:   None reported     Chemical Use Review:   Substance Use: Chemical use reviewed, no active concerns identified      Tobacco Use: No current tobacco use.        Collateral Reports  "Completed:   Routed note to PCP as needed    PLAN: (Patient Tasks / Therapist Tasks / Other)  Use mindfulness techniques as needed.  Access support regularly.  Past hw  Client to follow up on acupuncture, and other alternative sleep help  Talk with support system daily, reframe around how to include remote family         Gloria ZamoraHILDA                                                         ______________________________________________________________________    Treatment Plan    Patient's Name: Harrison Khan  YOB: 1985      DSM5 Diagnoses: PTSD, ELY  Psychosocial / Contextual Factors:   WHODAS: see emr    Referral / Collaboration:  Referral to another professional/service is not indicated at this time..    Anticipated number of session or this episode of care: quarterly or sooner as needed ex. monthly      MeasurableTreatment Goal(s) related to diagnosis / functional impairment(s)  Goal 1: Patient will report 50% reduced anxiety, reduced physio arousal levels.     I will know I've met my goal when\"  I can let go of thoughts and images in shorter amount of time.      Objective #A (Patient Action)    Patient will understand PTSD and learn 4 skills to assist wtih managing symptoms     Status:  Update 7- plan for the next 90 days is to enact these actions, both behavioral and emotional:  Review / commit daily to values and contribution.  Use of ongoing boundaries when people want to talk about current events.  [place] spinning class /weight lifting for stress management and improving mood.  Practice EFT and deep breathing .  Consider a small personal  memorial to honor the life lost at his work.      Status:  Update 4- plan for the next 90 days us to:  Practice EFT and deep breathing   Consider a small personal  memorial to honor the life lost.  Engage in healthy distractions ,  light hearted activities, begin to enjoy their  future  again. watching sports,  Music, " reading. Tune into circles where he can feel connected.  Re-invest in these tasks;   Consider self care- acupuncture.  figure out thresholds around # of days of poor sleep and slowed cog performance  Increase daily  self care- nutrition / exercise.  Monitoring threshold of stress via changes in self care.  Reads about ACT pick 2 skills and gain consistency,  Access peer support group and or debriefing   Practice as mind body technique . Use in the pm.   Monitor blood sugar levels and breathing      Intervention(s)  Therapist will  Teach mind body , thought chaining , CBT and coping skills to assist [facility] managing symptoms      Gloria Zamora LP

## 2020-09-15 DIAGNOSIS — F41.9 ANXIETY: ICD-10-CM

## 2020-09-16 NOTE — TELEPHONE ENCOUNTER
LORazepam (ATIVAN) 1 MG tablet       Last Written Prescription Date:  6/18/2020  Last Fill Quantity: 30,   # refills: 0  Last Office Visit: 9/8/2020  VIRTUAL VISIT  Future Office visit:    Next 5 appointments (look out 90 days)    Nov 12, 2020  4:00 PM CST  Return Visit with Gloria Zamora LP  [facility] [address] [place] [id number] [phone]           Routing refill request to provider for review/approval because:  Drug not on the [facility], [facility] or [facility] refill protocol or controlled substance.    Concha Vital RN

## 2020-09-18 RX ORDER — LORAZEPAM 1 MG/1
TABLET ORAL
Qty: 30 TABLET | Refills: 0 | Status: SHIPPED | OUTPATIENT
Start: 2020-09-18 | End: 2020-11-09

## 2020-09-30 ENCOUNTER — MYC MEDICAL ADVICE (OUTPATIENT)
Dept: FAMILY MEDICINE | Facility: CLINIC | Age: 35
End: 2020-09-30

## 2020-10-01 NOTE — TELEPHONE ENCOUNTER
Replied to patient via [employer]. Encouraged phone or video visit with Eliseo on Monday.    Roc Lawrence PA-C on 10/1/2020 at 2:38 PM

## 2020-10-11 ENCOUNTER — MYC REFILL (OUTPATIENT)
Dept: FAMILY MEDICINE | Facility: CLINIC | Age: 35
End: 2020-10-11

## 2020-10-11 DIAGNOSIS — F51.04 CHRONIC INSOMNIA: ICD-10-CM

## 2020-10-13 RX ORDER — ZALEPLON 5 MG/1
CAPSULE ORAL
Qty: 90 CAPSULE | Refills: 3 | Status: SHIPPED | OUTPATIENT
Start: 2020-10-13 | End: 2021-01-05

## 2020-10-20 ENCOUNTER — VIRTUAL VISIT (OUTPATIENT)
Dept: FAMILY MEDICINE | Facility: CLINIC | Age: 35
End: 2020-10-20
Payer: COMMERCIAL

## 2020-10-20 DIAGNOSIS — F51.04 CHRONIC INSOMNIA: Primary | ICD-10-CM

## 2020-10-20 PROCEDURE — 99213 OFFICE O/P EST LOW 20 MIN: CPT | Mod: 95 | Performed by: PHYSICIAN ASSISTANT

## 2020-10-20 RX ORDER — DOXEPIN HYDROCHLORIDE 25 MG/1
25-50 CAPSULE ORAL AT BEDTIME
Qty: 90 CAPSULE | Refills: 0 | Status: SHIPPED | OUTPATIENT
Start: 2020-10-20 | End: 2020-11-23

## 2020-10-20 ASSESSMENT — ANXIETY QUESTIONNAIRES
1. FEELING NERVOUS, ANXIOUS, OR ON EDGE: SEVERAL DAYS
3. WORRYING TOO MUCH ABOUT DIFFERENT THINGS: SEVERAL DAYS
IF YOU CHECKED OFF ANY PROBLEMS ON THIS QUESTIONNAIRE, HOW DIFFICULT HAVE THESE PROBLEMS MADE IT FOR YOU TO DO YOUR WORK, TAKE CARE OF THINGS AT HOME, OR GET ALONG WITH OTHER PEOPLE: SOMEWHAT DIFFICULT
2. NOT BEING ABLE TO STOP OR CONTROL WORRYING: NOT AT ALL
5. BEING SO RESTLESS THAT IT IS HARD TO SIT STILL: NOT AT ALL
6. BECOMING EASILY ANNOYED OR IRRITABLE: NOT AT ALL
7. FEELING AFRAID AS IF SOMETHING AWFUL MIGHT HAPPEN: NOT AT ALL
GAD7 TOTAL SCORE: 4

## 2020-10-20 ASSESSMENT — PATIENT HEALTH QUESTIONNAIRE - PHQ9
SUM OF ALL RESPONSES TO PHQ QUESTIONS 1-9: 9
5. POOR APPETITE OR OVEREATING: MORE THAN HALF THE DAYS

## 2020-10-20 NOTE — PROGRESS NOTES
"Harrison Khan is a 35 year old male who is being evaluated via a billable video visit.      The patient has been notified of following:     \"This video visit will be conducted via a call between you and your physician/provider. We have found that certain health care needs can be provided without the need for an in-person physical exam.  This service lets us provide the care you need with a video conversation.  If a prescription is necessary we can send it directly to your pharmacy.  If lab work is needed we can place an order for that and you can then stop by our lab to have the test done at a later time.    Video visits are billed at different rates depending on your insurance coverage.  Please reach out to your insurance provider with any questions.    If during the course of the call the physician/provider feels a video visit is not appropriate, you will not be charged for this service.\"    Patient has given verbal consent for Video visit? Yes  How would you like to obtain your AVS? MyChart  If you are dropped from the video visit, the video invite should be resent to: Text to cell phone: [phone]  Will anyone else be joining your video visit? No      Subjective     Harrison Khan is a 35 year old male who presents today via video visit for the following health issues:    HPI     Insomnia  Onset/Duration: 2 months   Description:   Frequency of insomnia:  5 times a week  Time to fall asleep (sleep latency): 2 hours  Middle of night awakening:  YES  Early morning awakening:  YES  Progression of Symptoms:  worsening  Accompanying Signs & Symptoms:  Daytime sleepiness/napping: no  Excessive snoring/apnea: no  Restless legs: no  Waking to urinate: YES once a night   Chronic pain:  no  Depression symptoms (if yes, do PHQ9): YES  Anxiety symptoms (if yes, do ELY-7): YES  History:  Prior Insomnia: YES 12 years   New stressful situation: no  Precipitating factors:   Caffeine intake: no  OTC decongestants: " no  Any new medications: no  Alleviating factors:  Self medicating (alcohol, etc.):  no  Stress-reduction (exercise, yoga, meditation etc): Exercise YES 5 -6 times a week   Therapies tried and outcome: Seroquel and Sonata     Kwaku has a longstanding hx of chronic insomnia.  He tends to go in phases of doing well but lately he has had a lot of trouble again.  He has done sleep studies, therapy and has been through multiple medications.  Some of these things have worked for him better than others and some have worked longer than others.    Currently he says that he is falling asleep ok the then wakes, then falls asleep and wakes again- all in a cycle.  He feels exhausted all the time.  Work is suffering.  He is using Sonata and 25mg seroquel. (50mg seroquel makes him anxious)    He just also scheduled to do acupuncture (local location- [place]).    Video Start Time: 10:58 AM      Review of Systems   Constitutional, HEENT, cardiovascular, pulmonary, gi and gu systems are negative, except as otherwise noted.      Objective           Vitals:  No vitals were obtained today due to virtual visit.    Physical Exam     GENERAL: Healthy, alert and no distress  EYES: Eyes grossly normal to inspection.  No discharge or erythema, or obvious scleral/conjunctival abnormalities.  RESP: No audible wheeze, cough, or visible cyanosis.  No visible retractions or increased work of breathing.    SKIN: Visible skin clear. No significant rash, abnormal pigmentation or lesions.  NEURO: Cranial nerves grossly intact.  Mentation and speech appropriate for age.  PSYCH: Mentation appears normal, affect normal/bright, judgement and insight intact, normal speech and appearance well-groomed.              Assessment & Plan     Chronic insomnia  Continue with Sonata and will try switching from seroquel to doxepin.  Considered mirtazapine as alternative but will try doxepin first to see if this is helpful.    - doxepin (SINEQUAN) 25 MG capsule;  "Take 1-2 capsules (25-50 mg) by mouth At Bedtime     BMI:   Estimated body mass index is 39.74 kg/m  as calculated from the following:    Height as of 6/11/20: 1.765 m (5' 9.5\").    Weight as of 6/11/20: 123.8 kg (273 lb).   Weight management plan: Discussed healthy diet and exercise guidelines         Return in about 2 weeks (around 11/3/2020) for if symptoms worsen or fail to improve.    Eliseo Benitez PA-C  [facility]      Video-Visit Details    Type of service:  Video Visit    Video End Time:11:12 AM    Originating Location (pt. Location): Home    Distant Location (provider location):  [facility]     Platform used for Video Visit: Ethel          "

## 2020-10-21 ASSESSMENT — ANXIETY QUESTIONNAIRES: GAD7 TOTAL SCORE: 4

## 2020-11-09 ENCOUNTER — MYC REFILL (OUTPATIENT)
Dept: FAMILY MEDICINE | Facility: CLINIC | Age: 35
End: 2020-11-09

## 2020-11-09 DIAGNOSIS — F41.9 ANXIETY: ICD-10-CM

## 2020-11-10 NOTE — TELEPHONE ENCOUNTER
Routing refill request to provider for review/approval because:  Drug not on the [facility] refill protocol     Joan Warner RN   [facility] -- Triage Nurse

## 2020-11-11 RX ORDER — LORAZEPAM 1 MG/1
1 TABLET ORAL EVERY 8 HOURS PRN
Qty: 30 TABLET | Refills: 0 | Status: SHIPPED | OUTPATIENT
Start: 2020-11-11 | End: 2021-01-01

## 2020-11-12 ENCOUNTER — VIRTUAL VISIT (OUTPATIENT)
Dept: PSYCHOLOGY | Facility: CLINIC | Age: 35
End: 2020-11-12
Payer: COMMERCIAL

## 2020-11-12 DIAGNOSIS — F41.1 GAD (GENERALIZED ANXIETY DISORDER): Primary | ICD-10-CM

## 2020-11-12 DIAGNOSIS — F43.10 PTSD (POST-TRAUMATIC STRESS DISORDER): ICD-10-CM

## 2020-11-12 PROCEDURE — 90834 PSYTX W PT 45 MINUTES: CPT | Mod: GT | Performed by: PSYCHOLOGIST

## 2020-11-12 NOTE — PROGRESS NOTES
"                                           Progress Note    Patient Name: Harrison Khan  Date :  11-         Service Type: Individual video visit       Telemedicine Visit: The patient's condition can be safely assessed and treated via synchronous audio and visual telemedicine encounter.       Reason for Telemedicine Visit:  telehealth visit due to covid-19     Originating Site (Patient Location): Patient's home     Distant Site (Provider Location): Provider Remote Setting     Consent:  The patient/guardian has verbally consented to: the potential risks and benefits of telemedicine (video visit) versus in person care; bill my insurance or make self-payment for services provided; and responsibility for payment of non-covered services.      Mode of Communication:  Video Conference via Doxy.     As the provider I attest to compliance with applicable laws and regulations related to telemedicine.       The patient has been notified of the following:   \"We have found that certain health care needs can be provided without the need for a face to face visit.  This service lets us provide the care you need with a phone conversation.  I will have full access to your [place] medical record during this entire video visit.   I will be taking notes for your medical record. Since this is like an office visit, we will bill your insurance company for this service.  There are potential benefits and risks of video  visits (e.g. limits to patient confidentiality) that differ from in-person visits.?  Confidentiality still applies for telephone services, and nobody will record the visit.  It is important to be in a quiet, private space that is free of distractions (including cell phone or other devices) during the visit.?? If during the course of the call I believe a video video visit is not appropriate, you will not be charged for this service\". Consent has been obtained for this service by care team member. "            Session Start Time: 4:00  Session End Time: 4:45   Session Length: 45 - 50 minutes    Session #: 9 in 2020,    14 overall    Attendees: Client     Treatment Plan Last Reviewed: today (90 days = 1-)  PHQ-9 / ELY-7 : see emr  CGI= 4/4 on 6-18-19  ( logged on 6-20-19)  CGI 4/2 on 10-19-19  CGI = 4/2 on 1-07-20  CGI= 4/4 on 4-  CGI= 3/2 on 8-  CGI= 3/2 on 11-    CSSI completed on 6-18-19 ( not showing in EMR - Epic known software [employer]) need to look in the original visit.      DATA  Interactive Complexity: No  Crisis: No      Diagnosis:  ELY, PTSD       Progress Since Last Session (Related to Symptoms / Goals / Homework):   Symptoms: stable    Homework: Completed in session      Episode of Care Goals: Satisfactory progress - ACTION (Actively working towards change); Intervened by reinforcing change plan / affirming steps taken     Current / Ongoing Stressors and Concerns:   Recent work task that is triggering    Sleep disruption   Interpersonal    Work stress      Treatment Objective(s) Addressed in This Session:   Support.  ACT  Review stressors, symptoms and skills used       Intervention:  Client verifies that he is in a private space to ensure confidentiality  and consents to this visit.  There are no concerns about medication refills.  They feel safe in their neighborhood.   Reports 0 SI/SIB.     Client reports that he has had some bad news-  Dad needs dialysis. Client experiencing increased anxiety.  Is supporting mom who is very anxious.    Did start acupuncture- has had 2 treatments.  Hopes to address insomnia .    Has had a few intrusive images - was on the case for a  bathtub drowning.  Has figured out that the trigger when interacting with family  and seeing the emotions of the family's faces.   Connects it to him finding his grandmother.  Tapping has been working.   Seeing the body is what comes back. Drawn to what the face look like. Or last rites given.  Explored  his grandmothers death scene.  Identified and processed emotions.        ASSESSMENT: Current Emotional / Mental Status (status of significant symptoms):   Risk status (Self / Other harm or suicidal ideation)   Patient denies current fears or concerns for personal safety.   Patient denies current or recent suicidal ideation or behaviors.   Patientdenies current or recent homicidal ideation or behaviors.   Patient denies current or recent self injurious behavior or ideation.   Patient denies other safety concerns.   Patient Patient reports there has been no change in risk factors since their last session.     PatientPatient reports there has been no change in protective factors since their last session.     Recommended that patient call 911 or go to the local ED should there be a change in any of these risk factors.     Appearance:   Appropriate    Eye Contact:   Good    Psychomotor Behavior: Normal    Attitude:   Cooperative    Orientation:   All   Speech    Rate / Production: Normal     Volume:  Normal    Mood:    Anxious  Normal Sad    Affect:    Appropriate  Worrisome    Thought Content:  Clear    Thought Form:  Coherent  Logical    Insight:    Good      Medication Review:   Med change - low to no side effects     Medication Compliance:   NA     Changes in Health Issues:   None reported     Chemical Use Review:   Substance Use: Chemical use reviewed, no active concerns identified      Tobacco Use: No current tobacco use.        Collateral Reports Completed:   Routed note to PCP as needed    PLAN: (Patient Tasks / Therapist Tasks / Other)  Consider having a new way to prepare going into a scene;  I am a caytlast for compassion  Vs having to do it all.  Or wearing a garment or a symbol of not being alone.  Find a way to ground afterward.  Past hw   Use EFT tapping as needed.          Gloria Zamora LP                                                      "    ______________________________________________________________________    Treatment Plan    Patient's Name: Harrison Khan  YOB: 1985      DSM5 Diagnoses: PTSD, ELY  Psychosocial / Contextual Factors:   WHODAS: see emr    Referral / Collaboration:  Referral to another professional/service is not indicated at this time..    Anticipated number of session or this episode of care: quarterly or sooner as needed ex. monthly      MeasurableTreatment Goal(s) related to diagnosis / functional impairment(s)  Goal 1: Patient will report 50% reduced anxiety, reduced physio arousal levels.     I will know I've met my goal when\"  I can let go of thoughts and images in shorter amount of time.      Objective #A (Patient Action)    Patient will understand PTSD and learn 4 skills to assist wtih managing symptoms       Status:  Update 11-  The plan for the next 90 day is continue with these actions and   tasks to develop more consistency / skill proficiency in effort to reduce symptoms.    Grief work- writing around grandmothers death.  Use EFT tapping as needed.  Use mindfulness techniques as needed.  Access support regularly.  Client to follow up on acupuncture, and other alternative sleep help.  Talk with support system daily, reframe around how to include remote family.          Status:  Update 7- plan for the next 90 days is to enact these actions, both behavioral and emotional:  Review / commit daily to values and contribution.  Use of ongoing boundaries when people want to talk about current events.  [place] spinning class /weight lifting for stress management and improving mood.  Practice EFT and deep breathing .  Consider a small personal  memorial to honor the life lost at his work.      Intervention(s)  Therapist will  Teach mind body , thought chaining , CBT and coping skills to assist wtih managing symptoms      Gloria Zamora LP    "

## 2020-11-17 DIAGNOSIS — F41.9 ANXIETY: ICD-10-CM

## 2020-11-19 RX ORDER — CITALOPRAM HYDROBROMIDE 10 MG/1
TABLET ORAL
Qty: 90 TABLET | Refills: 1 | Status: SHIPPED | OUTPATIENT
Start: 2020-11-19 | End: 2021-02-01

## 2020-11-19 NOTE — TELEPHONE ENCOUNTER
Prescription approved per [facility] Refill Protocol.    Shanti OSORIO RN, BSN

## 2020-11-23 ENCOUNTER — MYC REFILL (OUTPATIENT)
Dept: FAMILY MEDICINE | Facility: CLINIC | Age: 35
End: 2020-11-23

## 2020-11-23 DIAGNOSIS — F51.04 CHRONIC INSOMNIA: ICD-10-CM

## 2020-11-24 RX ORDER — DOXEPIN HYDROCHLORIDE 25 MG/1
50-75 CAPSULE ORAL AT BEDTIME
Qty: 90 CAPSULE | Refills: 4 | Status: SHIPPED | OUTPATIENT
Start: 2020-11-24 | End: 2021-05-04

## 2020-11-24 NOTE — TELEPHONE ENCOUNTER
Please adjust sig if appropriate.  Sig states 1-2 but pt states 2-3 was discussed  Janusz Mays RN, BSN

## 2020-12-14 ENCOUNTER — VIRTUAL VISIT (OUTPATIENT)
Dept: PSYCHOLOGY | Facility: CLINIC | Age: 35
End: 2020-12-14
Payer: COMMERCIAL

## 2020-12-14 DIAGNOSIS — F41.1 GAD (GENERALIZED ANXIETY DISORDER): Primary | ICD-10-CM

## 2020-12-14 DIAGNOSIS — F43.10 PTSD (POST-TRAUMATIC STRESS DISORDER): ICD-10-CM

## 2020-12-14 PROCEDURE — 90832 PSYTX W PT 30 MINUTES: CPT | Mod: TEL | Performed by: PSYCHOLOGIST

## 2020-12-14 NOTE — PROGRESS NOTES
"                                           Progress Note    Patient Name: Harrison Khan  Date :  12-         Service Type: Individual video visit       Telemedicine Visit: The patient's condition can be safely assessed and treated via synchronous audio and visual telemedicine encounter.       Reason for Telemedicine Visit:  telehealth visit due to covid-19     Originating Site (Patient Location): Patient's home     Distant Site (Provider Location): Provider Remote Setting     Consent:  The patient/guardian has verbally consented to: the potential risks and benefits of telemedicine (video visit) versus in person care; bill my insurance or make self-payment for services provided; and responsibility for payment of non-covered services.      Mode of Communication:  Video Conference via Doxy.     As the provider I attest to compliance with applicable laws and regulations related to telemedicine.       The patient has been notified of the following:   \"We have found that certain health care needs can be provided without the need for a face to face visit.  This service lets us provide the care you need with a phone conversation.  I will have full access to your [place] medical record during this entire video visit.   I will be taking notes for your medical record. Since this is like an office visit, we will bill your insurance company for this service.  There are potential benefits and risks of video  visits (e.g. limits to patient confidentiality) that differ from in-person visits.?  Confidentiality still applies for telephone services, and nobody will record the visit.  It is important to be in a quiet, private space that is free of distractions (including cell phone or other devices) during the visit.?? If during the course of the call I believe a video video visit is not appropriate, you will not be charged for this service\". Consent has been obtained for this service by care team member. "            Session Start Time: 4:32  Session End Time: 5:22  Session Length: 30 minutes  (16-37 mintes [id number])       Session #: 10 in 2020,    15 overall    Attendees: Client     Treatment Plan Last Reviewed: today (90 days = 1-)  PHQ-9 / ELY-7 : see emr  CGI= 4/4 on 6-18-19  ( logged on 6-20-19)  CGI 4/2 on 10-19-19  CGI = 4/2 on 1-07-20  CGI= 4/4 on 4-  CGI= 3/2 on 8-  CGI= 3/2 on 11- due Sarkis    CSSI completed on 6-18-19 ( not showing in EMR - Epic known software Beezag) need to look in the original visit.      DATA  Interactive Complexity: No  Crisis: No      Diagnosis:  ELY, PTSD       Progress Since Last Session (Related to Symptoms / Goals / Homework):   Symptoms: stable    Homework: Completed in session      Episode of Care Goals: Satisfactory progress - ACTION (Actively working towards change); Intervened by reinforcing change plan / affirming steps taken     Current / Ongoing Stressors and Concerns:   Recent work task that is triggering    Sleep disruption   Interpersonal    Work stress      Treatment Objective(s) Addressed in This Session:   Support.  ACT  Review stressors, symptoms and skills used       Intervention:  Client verifies that he is in a private space to ensure confidentiality  and consents to this visit.  There are no concerns about medication refills.  They feel safe in their neighborhood.   Reports 0 SI/SIB.     Client reports that his dad has had some improvements- dad not needing go on dialysis for 6 months.    Client experiencing increased low motivation.    Will resume acupuncture- after the holidays  (for Insomnia) .  No panic attacks since last session.    Is sometimes bored at work ;  Looked for other sources of meaning.  Identified several pursuits to bring back vigor in his life.    Identified and processed emotions.        ASSESSMENT: Current Emotional / Mental Status (status of significant symptoms):   Risk status (Self / Other harm or suicidal  ideation)   Patient denies current fears or concerns for personal safety.   Patient denies current or recent suicidal ideation or behaviors.   Patientdenies current or recent homicidal ideation or behaviors.   Patient denies current or recent self injurious behavior or ideation.   Patient denies other safety concerns.   Patient Patient reports there has been no change in risk factors since their last session.     PatientPatient reports there has been no change in protective factors since their last session.     Recommended that patient call 911 or go to the local ED should there be a change in any of these risk factors.     Appearance:   Appropriate    Eye Contact:   Good    Psychomotor Behavior: Normal    Attitude:   Cooperative    Orientation:   All   Speech    Rate / Production: Normal     Volume:  Normal    Mood:    Anxious  Normal Sad    Affect:    Appropriate  Worrisome    Thought Content:  Clear    Thought Form:  Coherent  Logical    Insight:    Good      Medication Review:   Med change - low to no side effects     Medication Compliance:   NA     Changes in Health Issues:   None reported     Chemical Use Review:   Substance Use: Chemical use reviewed, no active concerns identified      Tobacco Use: No current tobacco use.        Collateral Reports Completed:   Routed note to PCP as needed    PLAN: (Patient Tasks / Therapist Tasks / Other)  engage in several new pursuits to bring back vigor in life.  Past hw  Consider having a new way to prepare going into a scene;  I am a caytlast for compassion  Vs having to do it all.  Or wearing a garment or a symbol of not being alone.  Find a way to ground afterward.  Past hw   Use EFT tapping as needed.          Gloria Zamora LP                                                         ______________________________________________________________________    Treatment Plan    Patient's Name: Harrison Khan  YOB: 1985      DSM5 Diagnoses: PTSD,  "ELY  Psychosocial / Contextual Factors:   WHODAS: see emr    Referral / Collaboration:  Referral to another professional/service is not indicated at this time..    Anticipated number of session or this episode of care: quarterly or sooner as needed ex. monthly      MeasurableTreatment Goal(s) related to diagnosis / functional impairment(s)  Goal 1: Patient will report 50% reduced anxiety, reduced physio arousal levels.     I will know I've met my goal when\"  I can let go of thoughts and images in shorter amount of time.      Objective #A (Patient Action)    Patient will understand PTSD and learn 4 skills to assist wtih managing symptoms       Status:  Update 11-  The plan for the next 90 day is continue with these actions and   tasks to develop more consistency / skill proficiency in effort to reduce symptoms.    Grief work- writing around grandmothers death.  Use EFT tapping as needed.  Use mindfulness techniques as needed.  Access support regularly.  Client to follow up on acupuncture, and other alternative sleep help.  Talk with support system daily, reframe around how to include remote family.          Status:  Update 7- plan for the next 90 days is to enact these actions, both behavioral and emotional:  Review / commit daily to values and contribution.  Use of ongoing boundaries when people want to talk about current events.  [place] spinning class /weight lifting for stress management and improving mood.  Practice EFT and deep breathing .  Consider a small personal  memorial to honor the life lost at his work.      Intervention(s)  Therapist will  Teach mind body , thought chaining , CBT and coping skills to assist wtih managing symptoms      Gloria Zamora LP    "

## 2020-12-29 DIAGNOSIS — F41.9 ANXIETY: ICD-10-CM

## 2020-12-30 NOTE — TELEPHONE ENCOUNTER
LORazepam (ATIVAN) 1 MG tablet      Last Written Prescription Date:  11/11/2020  Last Fill Quantity: 30,   # refills: 0  Last Office Visit: 10/20/2020  VIRTUAL VISIT  Future Office visit:    Next 5 appointments (look out 90 days)    Jan 11, 2021  4:30 PM  Return Visit with Gloria Zamora LP  [facility] ([facility]) [address]  [facility] [address]  [phone]           Routing refill request to provider for review/approval because:  Drug not on the [facility], [facility] or [facility] refill protocol or controlled substance.    Concha Vital RN

## 2021-01-01 DIAGNOSIS — F51.04 CHRONIC INSOMNIA: ICD-10-CM

## 2021-01-01 RX ORDER — LORAZEPAM 1 MG/1
TABLET ORAL
Qty: 30 TABLET | Refills: 0 | Status: SHIPPED | OUTPATIENT
Start: 2021-01-01 | End: 2021-02-11

## 2021-01-05 RX ORDER — ZALEPLON 5 MG/1
CAPSULE ORAL
Qty: 90 CAPSULE | Refills: 3 | Status: SHIPPED | OUTPATIENT
Start: 2021-01-05 | End: 2021-05-03

## 2021-01-05 NOTE — TELEPHONE ENCOUNTER
zaleplon (SONATA) 5 MG capsule      Last Written Prescription Date:  10/13/2020  Last Fill Quantity: #90,   # refills: 3  Last Office Visit: 10/20/2020  (VIRTUAL VISIT)  Future Office visit:       Routing refill request to provider for review/approval because:  Drug not on the [facility], [facility] or [facility] refill protocol or controlled substance.    Concha Vital RN

## 2021-01-07 ENCOUNTER — E-VISIT (OUTPATIENT)
Dept: URGENT CARE | Facility: CLINIC | Age: 36
End: 2021-01-07

## 2021-01-07 ENCOUNTER — VIRTUAL VISIT (OUTPATIENT)
Dept: URGENT CARE | Facility: CLINIC | Age: 36
End: 2021-01-07
Payer: COMMERCIAL

## 2021-01-07 DIAGNOSIS — Z20.822 SUSPECTED COVID-19 VIRUS INFECTION: Primary | ICD-10-CM

## 2021-01-07 DIAGNOSIS — Z53.9 NO SHOW: Primary | ICD-10-CM

## 2021-01-07 DIAGNOSIS — Z20.822 SUSPECTED COVID-19 VIRUS INFECTION: ICD-10-CM

## 2021-01-07 LAB
SARS-COV-2 RNA RESP QL NAA+PROBE: NOT DETECTED
SPECIMEN SOURCE: NORMAL

## 2021-01-07 PROCEDURE — U0003 INFECTIOUS AGENT DETECTION BY NUCLEIC ACID (DNA OR RNA); SEVERE ACUTE RESPIRATORY SYNDROME CORONAVIRUS 2 (SARS-COV-2) (CORONAVIRUS DISEASE [COVID-19]), AMPLIFIED PROBE TECHNIQUE, MAKING USE OF HIGH THROUGHPUT TECHNOLOGIES AS DESCRIBED BY CMS-2020-01-R: HCPCS | Performed by: PHYSICIAN ASSISTANT

## 2021-01-07 PROCEDURE — 99421 OL DIG E/M SVC 5-10 MIN: CPT | Performed by: PHYSICIAN ASSISTANT

## 2021-01-07 PROCEDURE — U0005 INFEC AGEN DETEC AMPLI PROBE: HCPCS | Performed by: PHYSICIAN ASSISTANT

## 2021-01-07 NOTE — PATIENT INSTRUCTIONS
Dear Harrison Khan,    Your symptoms show that you may have coronavirus (COVID-19). This illness can cause fever, cough and trouble breathing. Many people get a mild case and get better on their own. Some people can get very sick.    Will I be tested for COVID-19?  We would like to test you for Covid-19 virus. I have placed orders for this test.     To schedule: go to your [facility] home page and scroll down to the section that says  You have an appointment that needs to be scheduled  and click the large green button that says  Schedule Now  and follow the steps to find the next available openings.    If you are unable to complete these [facility] scheduling steps, please call [phone] to schedule your testing.     Return to work/school/ guidance:  Please let your workplace manager and staffing office know when your quarantine ends     We can t give you an exact date as it depends on the above. You can calculate this on your own or work with your manager/staffing office to calculate this. (For example if you were exposed on 10/4, you would have to quarantine for 14 full days. That would be through 10/18. You could return on 10/19.)      If you receive a positive COVID-19 test result, follow the guidance of the those who are giving you the results. Usually the return to work is 10 (or in some cases 20 days from symptom onset.) If you work at [facility], you must also be cleared by Employee Occupational Health and Safety to return to work.        If you receive a negative COVID-19 test result and did not have a high risk exposure to someone with a known positive COVID-19 test, you can return to work once you're free of fever for 24 hours without fever-reducing medication and your symptoms are improving or resolved.      If you receive a negative COVID-19 test and If you had a high risk exposure to someone who has tested positive for COVID-19 then you can return to work 14 days after your last  contact with the positive individual    Note: If you have ongoing exposure to the covid positive person, this quarantine period may be more than 14 days. (For example, if you are continued to be exposed to your child who tested positive and cannot isolate from them, then the quarantine of 7-14 days can't start until your child is no longer contagious. This is typically 10 days from onset of the child's symptoms. So the total duration may be 17-24 days in this case.)    Sign up for [employer].   We know it's scary to hear that you might have COVID-19. We want to track your symptoms to make sure you're okay over the next 2 weeks. Please look for an email from [employer]--this is a free, online program that we'll use to keep in touch. To sign up, follow the link in the email you will receive. Learn more at [email or web address]    How can I take care of myself?    Get lots of rest. Drink extra fluids (unless a doctor has told you not to)    Take Tylenol (acetaminophen) or ibuprofen for fever or pain. If you have liver or kidney problems, ask your family doctor if it's okay to take Tylenol o ibuprofen    If you have other health problems (like cancer, heart failure, an organ transplant or severe kidney disease): Call your specialty clinic if you don't feel better in the next 2 days.    Know when to call 911. Emergency warning signs include:  o Trouble breathing or shortness of breath  o Pain or pressure in the chest that doesn't go away  o Feeling confused like you haven't felt before, or not being able to wake up  o Bluish-colored lips or face    Where can I get more information?  [facility] - About COVID-19:   [email or web address]    CDC - What to Do If You're Sick:   www.cdc.gov/coronavirus/2019-ncov/about/steps-when-sick.html    Dear Harrison Khan,    Your symptoms show that you may have coronavirus (COVID-19). This illness can cause fever, cough and trouble breathing. Many  people get a mild case and get better on their own. Some people can get very sick.    Will I be tested for COVID-19?  We would like to test you for Covid-19 virus. I have placed orders for this test.     For all employees or close contacts (except [place] and [employer] - see below), go to your [employer] home page and scroll down to the section that says  You have an appointment that needs to be scheduled  and click the large green button that says  Schedule Now  and follow the steps to find the next available opening.     If you are unable to complete these steps or if you cannot find any available times, please call [phone] to schedule employee testing.     [place] employees or close contacts, please call [phone].   [place]) employees or close contacts call [phone].    Return to work/school/ guidance:  Please let your workplace manager and staffing office know when your quarantine ends     We can t give you an exact date as it depends on the above. You can calculate this on your own or work with your manager/staffing office to calculate this. (For example if you were exposed on 10/4, you would have to quarantine for 14 full days. That would be through 10/18. You could return on 10/19.)      If you receive a positive COVID-19 test result, follow the guidance of the those who are giving you the results. Usually the return to work is 10 (or in some cases 20 days from symptom onset.) If you work at [facility] [employer], you must also be cleared by Employee Occupational Health and Safety to return to work.        If you receive a negative COVID-19 test result and did not have a high risk exposure to someone with a known positive COVID-19 test, you can return to work once you're free of fever for 24 hours without fever-reducing medication and your symptoms are improving or resolved.      If you receive a negative COVID-19 test and If you had a high risk exposure to someone who has tested  positive for COVID-19 then you can return to work 14 days after your last contact with the positive individual    Note: If you have ongoing exposure to the covid positive person, this quarantine period may be more than 14 days. (For example, if you are continued to be exposed to your child who tested positive and cannot isolate from them, then the quarantine of 7-14 days can't start until your child is no longer contagious. This is typically 10 days from onset of the child's symptoms. So the total duration may be 17-24 days in this case.)    Sign up for [employer].   We know it's scary to hear that you might have COVID-19. We want to track your symptoms to make sure you're okay over the next 2 weeks. Please look for an email from [employer]--this is a free, online program that we'll use to keep in touch. To sign up, follow the link in the email you will receive. Learn more at [email or web address]    How can I take care of myself?    Get lots of rest. Drink extra fluids (unless a doctor has told you not to)    Take Tylenol (acetaminophen) or ibuprofen for fever or pain. If you have liver or kidney problems, ask your family doctor if it's okay to take Tylenol o ibuprofen    If you have other health problems (like cancer, heart failure, an organ transplant or severe kidney disease): Call your specialty clinic if you don't feel better in the next 2 days.    Know when to call 911. Emergency warning signs include:  o Trouble breathing or shortness of breath  o Pain or pressure in the chest that doesn't go away  o Feeling confused like you haven't felt before, or not being able to wake up  o Bluish-colored lips or face    Where can I get more information?   [facility] - About COVID-19:   [email or web address]    CDC - What to Do If You're Sick:   www.cdc.gov/coronavirus/2019-ncov/about/steps-when-sick.html    January 7, 2021  RE:  Harrison Khan                                                                                                                   [facility] [address] [facility] [address]      To whom it may concern:    I evaluated Harrison Khan on January 7, 2021. Harrison Khan should be excused from work/school.     They should let their workplace manager and staffing office know when their quarantine ends.    We can not give an exact date as it depends on the information below. They can calculate this on their own or work with their manager/staffing office to calculate this. (For example if they were exposed on 10/04, they would have to quarantine for 14 full days. That would be through 10/18. They could return on 10/19.)    Quarantine Guidelines:      If patient receives a positive COVID-19 test result, they should follow the guidance of those who are giving the results. Usually the return to work is 10 (or in some cases 20 days from symptom onset.) If they work at [employer], they must be cleared by Employee Occupational Health and Safety to return to work.        If patient receives a negative COVID-19 test result and did not have a high risk exposure to someone with a known positive COVID-19 test, they can return to work once they're free of fever for 24 hours without fever-reducing medication and their symptoms are improving or resolved.      If patient receives a negative COVID-19 test and if they had a high risk exposure to someone who has tested positive for COVID-19 then they can return to work 14 days after their last contact with the positive individual    Note: If there is ongoing exposure to the covid positive person, this quarantine period may be longer than 14 days. (For example, if they are continually exposed to their child, who tested positive and cannot isolate from them, then the quarantine of 7-14 days can't start until their child is no longer contagious. This is typically 10 days from onset to the child's symptoms. So the total duration may be 17-24  days in this case.)     Sincerely,  Mallika Garcia PA-C

## 2021-01-15 ENCOUNTER — HEALTH MAINTENANCE LETTER (OUTPATIENT)
Age: 36
End: 2021-01-15

## 2021-01-29 DIAGNOSIS — F41.9 ANXIETY: ICD-10-CM

## 2021-01-29 NOTE — TELEPHONE ENCOUNTER
Citalopram 10mg    Routing refill request to provider for review/approval because:  PHQ-9 > 4.    PHQ 1/7/2020 4/28/2020 10/20/2020   PHQ-9 Total Score 2 5 9   Q9: Thoughts of better off dead/self-harm past 2 weeks Not at all Not at all Not at all     ELY-7 SCORE 1/7/2020 4/28/2020 10/20/2020   Total Score - - -   Total Score - - -   Total Score 2 3 4     Notes from OV:  Still taking Citalopram- two tablets daily (Rx in chart says three).    Concha Vital RN

## 2021-01-31 NOTE — CONFIDENTIAL NOTE
What dose is he taking?  And he needs a PHQ update please.    I can fill when these things are done.    Thanks!  Eliseo        PHQ 4/28/2020 10/20/2020 2/1/2021   PHQ-9 Total Score 5 9 5   Q9: Thoughts of better off dead/self-harm past 2 weeks Not at all Not at all Not at all

## 2021-02-01 RX ORDER — CITALOPRAM HYDROBROMIDE 10 MG/1
10 TABLET ORAL DAILY
Qty: 90 TABLET | Refills: 0 | Status: SHIPPED | OUTPATIENT
Start: 2021-02-01 | End: 2021-04-08

## 2021-02-01 ASSESSMENT — PATIENT HEALTH QUESTIONNAIRE - PHQ9: SUM OF ALL RESPONSES TO PHQ QUESTIONS 1-9: 5

## 2021-02-01 NOTE — TELEPHONE ENCOUNTER
Called the pt.  Advised of below.  He is taking 10 mg citalopram - 1 tablet.  He tried to go up, but it made him feel weird.  He is doing good on 10 mg.    Updated phq9.

## 2021-02-08 ENCOUNTER — VIRTUAL VISIT (OUTPATIENT)
Dept: PSYCHOLOGY | Facility: CLINIC | Age: 36
End: 2021-02-08
Payer: COMMERCIAL

## 2021-02-08 DIAGNOSIS — F43.10 PTSD (POST-TRAUMATIC STRESS DISORDER): ICD-10-CM

## 2021-02-08 DIAGNOSIS — F41.1 GAD (GENERALIZED ANXIETY DISORDER): Primary | ICD-10-CM

## 2021-02-08 PROCEDURE — 90834 PSYTX W PT 45 MINUTES: CPT | Mod: GT | Performed by: PSYCHOLOGIST

## 2021-02-08 NOTE — PROGRESS NOTES
"                                           Progress Note    Patient Name: Harrison Khan  Date :  2-         Service Type: Individual video visit       Telemedicine Visit: The patient's condition can be safely assessed and treated via synchronous audio and visual telemedicine encounter.       Reason for Telemedicine Visit:  telehealth visit due to covid-19     Originating Site (Patient Location): Patient's home     Distant Site (Provider Location): Provider Remote Setting     Consent:  The patient/guardian has verbally consented to: the potential risks and benefits of telemedicine (video visit) versus in person care; bill my insurance or make self-payment for services provided; and responsibility for payment of non-covered services.      Mode of Communication:  Video Conference via Doxy.     As the provider I attest to compliance with applicable laws and regulations related to telemedicine.       The patient has been notified of the following:   \"We have found that certain health care needs can be provided without the need for a face to face visit.  This service lets us provide the care you need with a phone conversation.  I will have full access to your [facility] medical record during this entire video visit.   I will be taking notes for your medical record. Since this is like an office visit, we will bill your insurance company for this service.  There are potential benefits and risks of video  visits (e.g. limits to patient confidentiality) that differ from in-person visits.?  Confidentiality still applies for telephone services, and nobody will record the visit.  It is important to be in a quiet, private space that is free of distractions (including cell phone or other devices) during the visit.?? If during the course of the call I believe a video video visit is not appropriate, you will not be charged for this service\". Consent has been obtained for this service by care team member.            Session " Start Time: 4:32  Session End Time: 5:22  Session Length: 38-52 minutes       Session #: 3 in 2021    Attendees: Client     Treatment Plan Last Reviewed: today (90 days = due 5-2021)  PHQ-9 / ELY-7 : see emr  CGI= 4/4 on 6-18-19  ( logged on 6-20-19)  CGI 4/2 on 10-19-19  CGI = 4/2 on 1-07-20  CGI= 4/4 on 4-  CGI= 3/2 on 8-  CGI= 3/2 on 11-  CGI= 3/2 on 2-    CSSI completed on 6-18-19 ( not showing in EMR - Epic known software [employer]) need to look in the original visit.      DATA  Interactive Complexity: No  Crisis: No      Diagnosis:  ELY, PTSD       Progress Since Last Session (Related to Symptoms / Goals / Homework):   Symptoms: stable    Homework: Completed in session      Episode of Care Goals: Satisfactory progress - ACTION (Actively working towards change); Intervened by reinforcing change plan / affirming steps taken     Current / Ongoing Stressors and Concerns:      Sleep disruption - chronic   Interpersonal    Work stress      Treatment Objective(s) Addressed in This Session:   Support.  ACT  Review stressors, symptoms and skills used       Intervention:  Client verifies that he is in a private space to ensure confidentiality  and consents to this visit.  There are no concerns about medication refills.  They feel safe in their neighborhood.   Reports 0 SI/SIB.     Client reports that he has been in a funk-low interest, low motivation.  Recognizes that he misses his family.   Has resumed acupuncture-helps a bit .  No panic attacks since last session.  Has been doing a Peloton exercise regiment- likes it .     Explored transition needs.  Reports that he has 2 interesting committees at work.  Discussed mentoring role- is interested; could bring meaning.  Identified several themes - know he needs consitent breaks.  Knows he needs career exploration.    Identified and processed emotions.        ASSESSMENT: Current Emotional / Mental Status (status of significant symptoms):   Risk  status (Self / Other harm or suicidal ideation)   Patient denies current fears or concerns for personal safety.   Patient denies current or recent suicidal ideation or behaviors.   Patientdenies current or recent homicidal ideation or behaviors.   Patient denies current or recent self injurious behavior or ideation.   Patient denies other safety concerns.   Patient Patient reports there has been no change in risk factors since their last session.     PatientPatient reports there has been no change in protective factors since their last session.     Recommended that patient call 911 or go to the local ED should there be a change in any of these risk factors.     Appearance:   Appropriate    Eye Contact:   Good    Psychomotor Behavior: Normal    Attitude:   Cooperative    Orientation:   All   Speech    Rate / Production: Normal     Volume:  Normal    Mood:    Anxious  Normal Sad    Affect:    Appropriate  Worrisome    Thought Content:  Clear    Thought Form:  Coherent  Logical    Insight:    Good      Medication Review:   Med change - low to no side effects     Medication Compliance:   NA     Changes in Health Issues:   None reported     Chemical Use Review:   Substance Use: Chemical use reviewed, no active concerns identified      Tobacco Use: No current tobacco use.        Collateral Reports Completed:   Routed note to PCP as needed    PLAN: (Patient Tasks / Therapist Tasks / Other)  Chose self talk that enhances theme=mes on meaning.  Find some new conversations- about adjunct career options:  call his uncle in next 1 week.    Gloria Zamora LP                                                         ______________________________________________________________________    Treatment Plan    Patient's Name: Harrison Khan  YOB: 1985      DSM5 Diagnoses: PTSD, ELY  Psychosocial / Contextual Factors:   WHODAS: see emr    Referral / Collaboration:  Referral to another  "professional/service is not indicated at this time..    Anticipated number of session or this episode of care: quarterly or sooner as needed ex. monthly      MeasurableTreatment Goal(s) related to diagnosis / functional impairment(s)  Goal 1: Patient will report 50% reduced anxiety, reduced physio arousal levels.     I will know I've met my goal when\"  I can let go of thoughts and images in shorter amount of time.      Objective #A (Patient Action)    Patient will understand PTSD and learn 4 skills to assist wtih managing symptoms     Status:  Update 2-  The plan for the next 90 day is continue with these actions and   tasks to develop more consistency / skill proficiency in effort to reduce symptoms.    Engage in several new pursuits to bring back vigor in life.  Consider having a new way to prepare going into a scene;  I am a caytlast for compassion  Vs having to do it all.  Or wearing a garment or a symbol of not being alone.  Find a way to ground afterward.  use EFT tapping as needed.      Status:  Update 11-  The plan for the next 90 day is continue with these actions and   tasks to develop more consistency / skill proficiency in effort to reduce symptoms.    Grief work- writing around grandmothers death.  Use EFT tapping as needed.  Use mindfulness techniques as needed.  Access support regularly.  Client to follow up on acupuncture, and other alternative sleep help.  Talk with support system daily, reframe around how to include remote family.          Status:  Update 7- plan for the next 90 days is to enact these actions, both behavioral and emotional:  Review / commit daily to values and contribution.  Use of ongoing boundaries when people want to talk about current events.  [place] spinning class /weight lifting for stress management and improving mood.  Practice EFT and deep breathing .  Consider a small personal  memorial to honor the life lost at his " work.      Intervention(s)  Therapist will  Teach mind body , thought chaining , CBT and coping skills to assist wtih managing symptoms      Gloria Zamora LP

## 2021-02-11 ENCOUNTER — MYC REFILL (OUTPATIENT)
Dept: FAMILY MEDICINE | Facility: CLINIC | Age: 36
End: 2021-02-11

## 2021-02-11 DIAGNOSIS — F41.9 ANXIETY: ICD-10-CM

## 2021-02-11 NOTE — TELEPHONE ENCOUNTER
LORazepam (ATIVAN) 1 MG tablet       Last Written Prescription Date:  1/1/2021  Last Fill Quantity: 30,   # refills: 0  Last Office Visit: 10/20/2020  (VIRTUAL VISIT)  Future Office visit:       Routing refill request to provider for review/approval because:  Drug not on the [facility], P or [facility] refill protocol or controlled substance.    Concha Vital RN

## 2021-02-11 NOTE — LETTER
February 23, 2021      Harrison Khan  [address] [facility] [address]        Dear Mr. Harrison Khan,    We recently received a call from your pharmacy requesting a refill of your medication Lorazepam.    We are contacting you today to notify you that you are due for a medication check for further refills.    We have authorized one refill of your medication to allow time for you to schedule your appointment.    This appointment can be in clinic or virtual by either telephone, video.    Please call [phone] to schedule an appointment or if you have MyChart you can schedule with your provider as well.    Taking care of your health is important to us, an ongoing visits with your provider are vital to your care. We look forward to seeing you in the near future.    Thank you for using [facility] for your Medical Needs.          Sincerely,     Eliseo Benitez PA-C

## 2021-02-14 RX ORDER — LORAZEPAM 1 MG/1
1 TABLET ORAL EVERY 8 HOURS PRN
Qty: 10 TABLET | Refills: 0 | Status: SHIPPED | OUTPATIENT
Start: 2021-02-14 | End: 2021-03-09

## 2021-02-14 NOTE — TELEPHONE ENCOUNTER
Please call to see if he will set up an appointment with me.  Virtual is fine.  He had a refill of this about 6 weeks ago.  Want to discuss and see if we can figure out additional solutions.    Thanks!  I will fill small supply now.    Eliseo

## 2021-02-17 ENCOUNTER — VIRTUAL VISIT (OUTPATIENT)
Dept: FAMILY MEDICINE | Facility: CLINIC | Age: 36
End: 2021-02-17
Payer: COMMERCIAL

## 2021-02-17 DIAGNOSIS — E66.01 MORBID OBESITY (H): ICD-10-CM

## 2021-02-17 DIAGNOSIS — G47.00 INSOMNIA, UNSPECIFIED TYPE: Primary | ICD-10-CM

## 2021-02-17 DIAGNOSIS — F32.1 MODERATE MAJOR DEPRESSION (H): ICD-10-CM

## 2021-02-17 PROCEDURE — 99214 OFFICE O/P EST MOD 30 MIN: CPT | Mod: 95 | Performed by: PHYSICIAN ASSISTANT

## 2021-02-17 PROCEDURE — 96127 BRIEF EMOTIONAL/BEHAV ASSMT: CPT | Performed by: PHYSICIAN ASSISTANT

## 2021-02-17 ASSESSMENT — ANXIETY QUESTIONNAIRES
6. BECOMING EASILY ANNOYED OR IRRITABLE: SEVERAL DAYS
2. NOT BEING ABLE TO STOP OR CONTROL WORRYING: NOT AT ALL
IF YOU CHECKED OFF ANY PROBLEMS ON THIS QUESTIONNAIRE, HOW DIFFICULT HAVE THESE PROBLEMS MADE IT FOR YOU TO DO YOUR WORK, TAKE CARE OF THINGS AT HOME, OR GET ALONG WITH OTHER PEOPLE: SOMEWHAT DIFFICULT
1. FEELING NERVOUS, ANXIOUS, OR ON EDGE: SEVERAL DAYS
7. FEELING AFRAID AS IF SOMETHING AWFUL MIGHT HAPPEN: NOT AT ALL
3. WORRYING TOO MUCH ABOUT DIFFERENT THINGS: SEVERAL DAYS
GAD7 TOTAL SCORE: 4
5. BEING SO RESTLESS THAT IT IS HARD TO SIT STILL: NOT AT ALL

## 2021-02-17 ASSESSMENT — PATIENT HEALTH QUESTIONNAIRE - PHQ9
5. POOR APPETITE OR OVEREATING: SEVERAL DAYS
SUM OF ALL RESPONSES TO PHQ QUESTIONS 1-9: 6

## 2021-02-17 NOTE — Clinical Note
Lance Forrester- I cannot close this chart.  It says you still have something unfinished.  Can you look to see what it might be and close it?  Thank you .

## 2021-02-17 NOTE — PROGRESS NOTES
"Kwaku is a 35 year old who is being evaluated via a billable video visit.      How would you like to obtain your AVS? MyChart  If the video visit is dropped, the invitation should be resent by: Text to cell phone: [phone]  Will anyone else be joining your video visit? No      Assessment & Plan     Insomnia, unspecified type  Chronic, ongoing.  Continue counseling and other supportive treatments.  OK to request small amounts of Ativan for when sleeplessness causes him anxiety.    Moderate major depression (H)  Mood is relatively controlled with 10mg Celexa- does not tolerate higher doses.  PHQ 10/20/2020 2/1/2021 2/17/2021   PHQ-9 Total Score 9 5 6   Q9: Thoughts of better off dead/self-harm past 2 weeks Not at all Not at all Not at all     Follow up 6 months.    Morbid obesity (H)    Estimated body mass index is 39.74 kg/m  as calculated from the following:    Height as of 6/11/20: 1.765 m (5' 9.5\").    Weight as of 6/11/20: 123.8 kg (273 lb).    Complete \"Weight Managment Plan\" in the progress note from the Adult Preventative or Medicare smartsets, use phrase .WEIGHTPLAN, or choose an option from Weight Management Resources smartset below.      Return in about 6 months (around 8/17/2021) for depression/anxiety med check.    ANGELIQUE Phan [facility] [place]    Subjective   Kwaku is a 35 year old who presents for the following health issues:    HPI       Depression and Anxiety Follow-Up    How are you doing with your depression since your last visit? Improved     How are you doing with your anxiety since your last visit?  Worsened IN the last couple of weeks     Are you having other symptoms that might be associated with depression or anxiety? No    Have you had a significant life event? No     Do you have any concerns with your use of alcohol or other drugs? No    Social History     Tobacco Use     Smoking status: Former Smoker     Packs/day: 0.00     Years: 0.00     Pack years: 0.00 "     Types: Cigarettes     Smokeless tobacco: Former User     Types: Chew     Quit date: 4/17/2018   Substance Use Topics     Alcohol use: Yes     Alcohol/week: 0.0 standard drinks     Comment: occasional     Drug use: No     PHQ 10/20/2020 2/1/2021 2/17/2021   PHQ-9 Total Score 9 5 6   Q9: Thoughts of better off dead/self-harm past 2 weeks Not at all Not at all Not at all     ELY-7 SCORE 4/28/2020 10/20/2020 2/17/2021   Total Score - - -   Total Score - - -   Total Score 3 4 4     Last PHQ-9 2/17/2021   1.  Little interest or pleasure in doing things 0   2.  Feeling down, depressed, or hopeless 1   3.  Trouble falling or staying asleep, or sleeping too much 3   4.  Feeling tired or having little energy 1   5.  Poor appetite or overeating 0   6.  Feeling bad about yourself 0   7.  Trouble concentrating 1   8.  Moving slowly or restless 0   Q9: Thoughts of better off dead/self-harm past 2 weeks 0   PHQ-9 Total Score 6   Difficulty at work, home, or with people Somewhat difficult     ELY-7  2/17/2021   1. Feeling nervous, anxious, or on edge 1   2. Not being able to stop or control worrying 0   3. Worrying too much about different things 1   4. Trouble relaxing 1   5. Being so restless that it is hard to sit still 0   6. Becoming easily annoyed or irritable 1   7. Feeling afraid, as if something awful might happen 0   ELY-7 Total Score 4   If you checked any problems, how difficult have they made it for you to do your work, take care of things at home, or get along with other people? Somewhat difficult     Just taking 10mg Celexa.  When he went up to 20mg he did not feel like himself and had sexual side effects.      How many servings of fruits and vegetables do you eat daily?  2-3    On average, how many sweetened beverages do you drink each day (Examples: soda, juice, sweet tea, etc.  Do NOT count diet or artificially sweetened beverages)?   0    How many days per week do you exercise enough to make your heart beat  "faster? 6    How many minutes a day do you exercise enough to make your heart beat faster? 60 or more    How many days per week do you miss taking your medication? 0    Insomnia- still struggling with sleep.  Waking with night terrors again.  Does not fall asleep for 2-3 hours then often wakes after a short while and cannot get to sleep.  I asked for this visit because he requested ativan two months in a row and had not done that for a while.  This was due to recent increased difficulty with sleep again.  He takes doxepin and two sonata before bed.  Then if wakes he does take another sonata and lately will need that ativan due to anxiety regarding not being able to sleep.  This is a very ongoing and chronic issue for Kwaku.  He has been doing accupuncture for 1.5 months without improvement in sleep so far.      Review of Systems   Constitutional, HEENT, cardiovascular, pulmonary, gi and gu systems are negative, except as otherwise noted.      Objective    Vitals - Patient Reported  Weight (Patient Reported): 124.7 kg (275 lb)  Height (Patient Reported): 172.7 cm (5' 8\")  BMI (Based on Pt Reported Ht/Wt): 41.81  Pulse (Patient Reported): 72      Vitals:  No vitals were obtained today due to virtual visit.    Physical Exam   GENERAL: Healthy, alert and no distress  RESP: No audible wheeze, cough, or visible cyanosis.  No visible retractions or increased work of breathing.    PSYCH: Mentation appears normal, affect normal/bright, judgement and insight intact, normal speech and appearance well-groomed.          Video-Visit Details    Type of service:  Video Visit    Call End Time:10:00 AM    Originating Location (pt. Location): Home    Distant Location (provider location):  [facility] [place]     Platform used for Video Visit: Unable to complete video visit  "

## 2021-02-17 NOTE — PROGRESS NOTES
"Kwaku is a 35 year old who is being evaluated via a billable telephone visit.      What phone number would you like to be contacted at? ***  How would you like to obtain your AVS? {AVS Preference:627810}    {PROVIDER CHARTING PREFERENCE:588331}    Subjective   Kwaku is a 35 year old who presents for the following health issues {ACCOMPANIED BY STATEMENT (Optional):560975}    HPI       {SUPERLIST (Optional):649898}  {additonal problems for provider to add (Optional):515706}    Review of Systems   {ROS COMP (Optional):348551}      Objective    Vitals - Patient Reported  Weight (Patient Reported): 124.7 kg (275 lb)  Height (Patient Reported): 172.7 cm (5' 8\")  BMI (Based on Pt Reported Ht/Wt): 41.81  Pulse (Patient Reported): 72        Physical Exam   {GENERAL APPEARANCE:50::\"healthy\",\"alert\",\"no distress\"}  PSYCH: Alert and oriented times 3; coherent speech, normal   rate and volume, able to articulate logical thoughts, able   to abstract reason, no tangential thoughts, no hallucinations   or delusions  His affect is { :7441601::\"normal\"}  RESP: No cough, no audible wheezing, able to talk in full sentences  Remainder of exam unable to be completed due to telephone visits    {Diagnostic Test Results (Optional):973847}    {AMBULATORY ATTESTATION (Optional):388661}        Phone call duration: *** minutes  "

## 2021-02-18 ASSESSMENT — ANXIETY QUESTIONNAIRES: GAD7 TOTAL SCORE: 4

## 2021-03-09 ENCOUNTER — MYC REFILL (OUTPATIENT)
Dept: FAMILY MEDICINE | Facility: CLINIC | Age: 36
End: 2021-03-09

## 2021-03-09 DIAGNOSIS — F41.9 ANXIETY: ICD-10-CM

## 2021-03-10 NOTE — TELEPHONE ENCOUNTER
LORazepam (ATIVAN) 1 MG tablet       Last Written Prescription Date:  2/14/2021  Last Fill Quantity: 10,   # refills: 0  Last Office Visit: 2/17/2021  (VIRTUAL VISIT)  Future Office visit:    Next 5 appointments (look out 90 days)    Apr 09, 2021  5:00 PM  Return Visit with Gloria Zamora LP  [facility] ([facility] ) [address] [place] [address] [address]  [facility] [id number] [phone]           Routing refill request to provider for review/approval because:  Drug not on the [facility], P or [facility] refill protocol or controlled substance.    Concha Vital RN

## 2021-03-11 RX ORDER — LORAZEPAM 1 MG/1
1 TABLET ORAL EVERY 8 HOURS PRN
Qty: 30 TABLET | Refills: 0 | Status: SHIPPED | OUTPATIENT
Start: 2021-03-11 | End: 2021-04-08

## 2021-03-18 ENCOUNTER — DOCUMENTATION ONLY (OUTPATIENT)
Dept: FAMILY MEDICINE | Facility: CLINIC | Age: 36
End: 2021-03-18

## 2021-04-06 ENCOUNTER — VIRTUAL VISIT (OUTPATIENT)
Dept: PSYCHOLOGY | Facility: CLINIC | Age: 36
End: 2021-04-06
Payer: COMMERCIAL

## 2021-04-06 DIAGNOSIS — F41.1 GAD (GENERALIZED ANXIETY DISORDER): Primary | ICD-10-CM

## 2021-04-06 DIAGNOSIS — F43.10 PTSD (POST-TRAUMATIC STRESS DISORDER): ICD-10-CM

## 2021-04-06 PROCEDURE — 90834 PSYTX W PT 45 MINUTES: CPT | Mod: GT | Performed by: PSYCHOLOGIST

## 2021-04-06 NOTE — PROGRESS NOTES
"                                           Progress Note    Patient Name: Harrison Khan  Date :  4-         Service Type: Individual video visit       Telemedicine Visit: The patient's condition can be safely assessed and treated via synchronous audio and visual telemedicine encounter.       Reason for Telemedicine Visit:  telehealth visit due to covid-19     Originating Site (Patient Location): Patient's home     Distant Site (Provider Location): Provider Remote Setting     Consent:  The patient/guardian has verbally consented to: the potential risks and benefits of telemedicine (video visit) versus in person care; bill my insurance or make self-payment for services provided; and responsibility for payment of non-covered services.      Mode of Communication:  Video Conference via Doxy.     As the provider I attest to compliance with applicable laws and regulations related to telemedicine.       The patient has been notified of the following:   \"We have found that certain health care needs can be provided without the need for a face to face visit.  This service lets us provide the care you need with a phone conversation.  I will have full access to your [facility] medical record during this entire video visit.   I will be taking notes for your medical record. Since this is like an office visit, we will bill your insurance company for this service.  There are potential benefits and risks of video  visits (e.g. limits to patient confidentiality) that differ from in-person visits.?  Confidentiality still applies for telephone services, and nobody will record the visit.  It is important to be in a quiet, private space that is free of distractions (including cell phone or other devices) during the visit.?? If during the course of the call I believe a video video visit is not appropriate, you will not be charged for this service\". Consent has been obtained for this service by care team member.            Session " "Start Time: 3:15  Session End Time: 4:30  Session Length: 38-52 minutes       Session #: 4 in 2021    Attendees: Client     Treatment Plan Last Reviewed: today (90 days = due 5-2021)  PHQ-9 / ELY-7 : see emr  CGI= 4/4 on 6-18-19  ( logged on 6-20-19)  CGI 4/2 on 10-19-19  CGI = 4/2 on 1-07-20  CGI= 4/4 on 4-  CGI= 3/2 on 8-  CGI= 3/2 on 11-  CGI= 3/2 on 2- due in May    CSSI completed on 6-18-19 ( not showing in EMR - Epic known software [employer]) need to look in the original visit.      DATA  Interactive Complexity: No  Crisis: No      Diagnosis:  ELY, PTSD       Progress Since Last Session (Related to Symptoms / Goals / Homework):   Symptoms: stable    Homework: Completed in session      Episode of Care Goals: Satisfactory progress - ACTION (Actively working towards change); Intervened by reinforcing change plan / affirming steps taken     Current / Ongoing Stressors and Concerns:      Sleep disruption - chronic   Interpersonal    Work stress      Treatment Objective(s) Addressed in This Session:   Support.  ACT  Review stressors, symptoms and skills used       Intervention:  Client verifies that he is in a private space to ensure confidentiality  and consents to this visit.  There are no concerns about medication refills.  They feel safe in their neighborhood.   Reports 0 SI/SIB.     Client reports that he has been having night terrors.  Anxiety is up.  Insomnia too.  Panic attack all day today- had to take the day off today.  Has been a break and saw family in [place].  Nervous about going back to work.  \"feel like I am always on\". Feels meds / skills don't work at this level of symptomology.  Family said he has changed as a person, not as engaged.  Had good conversation with uncle retired law enforcement.  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    Describes a heavy case  work load- feeling overwhelmed.  Racing thoughts before bed or seeing flashbacks of  dead people.  \"I " "feel like cant get out of fight / flight\". And at other times- flat.  Has bad acid reflux.   Some alcohol use / binge eating.    Identified and processed emotions.        ASSESSMENT: Current Emotional / Mental Status (status of significant symptoms):   Risk status (Self / Other harm or suicidal ideation)   Patient denies current fears or concerns for personal safety.   Patient denies current or recent suicidal ideation or behaviors.   Patientdenies current or recent homicidal ideation or behaviors.   Patient denies current or recent self injurious behavior or ideation.   Patient denies other safety concerns.   Patient Patient reports there has been no change in risk factors since their last session.     PatientPatient reports there has been no change in protective factors since their last session.     Recommended that patient call 911 or go to the local ED should there be a change in any of these risk factors.     Appearance:   Appropriate    Eye Contact:   Good    Psychomotor Behavior: Normal    Attitude:   Cooperative    Orientation:   All   Speech    Rate / Production: Normal     Volume:  Normal    Mood:    Anxious  Normal Sad    Affect:    Appropriate  Worrisome    Thought Content:  Clear    Thought Form:  Coherent  Logical    Insight:    Good      Medication Review:   Med change - low to no side effects     Medication Compliance:   NA     Changes in Health Issues:   None reported     Chemical Use Review:   Substance Use: Chemical use reviewed, no active concerns identified      Tobacco Use: No current tobacco use.        Collateral Reports Completed:   Routed note to PCP as needed    PLAN: (Patient Tasks / Therapist Tasks / Other)  make a career transition plan  Discuss sleep medication change   Reduce any nicotine / alcohol use / binge eating  Askfor intermittent FMLA form PCP  Past hw  Chose self talk that enhances themes on meaning.  Find some new conversations- about adjunct career options:  call his uncle " "in next 1 week.    Gloria Zamora, HILDA                                                         ______________________________________________________________________    Treatment Plan    Patient's Name: Harrison Khan  YOB: 1985      DSM5 Diagnoses: PTSD, ELY  Psychosocial / Contextual Factors:   WHODAS: see emr    Referral / Collaboration:  Referral to another professional/service is not indicated at this time..    Anticipated number of session or this episode of care: quarterly or sooner as needed ex. monthly      MeasurableTreatment Goal(s) related to diagnosis / functional impairment(s)  Goal 1: Patient will report 50% reduced anxiety, reduced physio arousal levels.     I will know I've met my goal when\"  I can let go of thoughts and images in shorter amount of time.      Objective #A (Patient Action)    Patient will understand PTSD and learn 4 skills to assist wtih managing symptoms     Status:  Update 2-  The plan for the next 90 day is continue with these actions and   tasks to develop more consistency / skill proficiency in effort to reduce symptoms.    Engage in several new pursuits to bring back vigor in life.  Consider having a new way to prepare going into a scene;  I am a caytlast for compassion  Vs having to do it all.  Or wearing a garment or a symbol of not being alone.  Find a way to ground afterward.  use EFT tapping as needed.      Status:  Update 11-  The plan for the next 90 day is continue with these actions and   tasks to develop more consistency / skill proficiency in effort to reduce symptoms.    Grief work- writing around grandmothers death.  Use EFT tapping as needed.  Use mindfulness techniques as needed.  Access support regularly.  Client to follow up on acupuncture, and other alternative sleep help.  Talk with support system daily, reframe around how to include remote family.        Intervention(s)  Therapist will  Teach mind body , thought " chaining , CBT and coping skills to assist wtih managing symptoms      Gloria Zamora LP

## 2021-04-08 ENCOUNTER — VIRTUAL VISIT (OUTPATIENT)
Dept: FAMILY MEDICINE | Facility: CLINIC | Age: 36
End: 2021-04-08
Payer: COMMERCIAL

## 2021-04-08 DIAGNOSIS — F43.10 PTSD (POST-TRAUMATIC STRESS DISORDER): Primary | ICD-10-CM

## 2021-04-08 DIAGNOSIS — F41.9 ANXIETY: ICD-10-CM

## 2021-04-08 PROCEDURE — 99214 OFFICE O/P EST MOD 30 MIN: CPT | Mod: 95 | Performed by: PHYSICIAN ASSISTANT

## 2021-04-08 RX ORDER — CITALOPRAM HYDROBROMIDE 20 MG/1
20 TABLET ORAL DAILY
Qty: 90 TABLET | Refills: 1 | Status: SHIPPED | OUTPATIENT
Start: 2021-04-08 | End: 2021-07-21

## 2021-04-08 RX ORDER — LORAZEPAM 1 MG/1
1 TABLET ORAL EVERY 8 HOURS PRN
Qty: 30 TABLET | Refills: 0 | Status: SHIPPED | OUTPATIENT
Start: 2021-04-08 | End: 2021-05-17

## 2021-04-08 ASSESSMENT — ANXIETY QUESTIONNAIRES
5. BEING SO RESTLESS THAT IT IS HARD TO SIT STILL: NOT AT ALL
IF YOU CHECKED OFF ANY PROBLEMS ON THIS QUESTIONNAIRE, HOW DIFFICULT HAVE THESE PROBLEMS MADE IT FOR YOU TO DO YOUR WORK, TAKE CARE OF THINGS AT HOME, OR GET ALONG WITH OTHER PEOPLE: EXTREMELY DIFFICULT
1. FEELING NERVOUS, ANXIOUS, OR ON EDGE: NEARLY EVERY DAY
2. NOT BEING ABLE TO STOP OR CONTROL WORRYING: NEARLY EVERY DAY
7. FEELING AFRAID AS IF SOMETHING AWFUL MIGHT HAPPEN: SEVERAL DAYS
3. WORRYING TOO MUCH ABOUT DIFFERENT THINGS: NEARLY EVERY DAY
GAD7 TOTAL SCORE: 16
6. BECOMING EASILY ANNOYED OR IRRITABLE: NEARLY EVERY DAY

## 2021-04-08 ASSESSMENT — PATIENT HEALTH QUESTIONNAIRE - PHQ9
SUM OF ALL RESPONSES TO PHQ QUESTIONS 1-9: 15
5. POOR APPETITE OR OVEREATING: NEARLY EVERY DAY

## 2021-04-08 NOTE — PROGRESS NOTES
Kwaku is a 35 year old who is being evaluated via a billable video visit.      How would you like to obtain your AVS? MyChart  If the video visit is dropped, the invitation should be resent by: Text to cell phone: [phone]  Will anyone else be joining your video visit? No    Video Start Time: 9:56 AM    Assessment & Plan     PTSD (post-traumatic stress disorder)  Kwaku is having a difficult time right now.  I suspect that his job is triggering this current episode for him and he seems to have the same thoughts.  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    Anxiety  Increase Celexa to 20mg- if not tolerating, can go to 1/2 tab.  Refill Ativan for now as well.  Will fill out CSA for #30 monthly.  He has been using more, it is not consistent with daily use but some days needs even 2-3 tabs.  - citalopram (CELEXA) 20 MG tablet; Take 1 tablet (20 mg) by mouth daily  - LORazepam (ATIVAN) 1 MG tablet; Take 1 tablet (1 mg) by mouth every 8 hours as needed for anxiety or sleep       Depression Screening Follow Up    PHQ 4/8/2021   PHQ-9 Total Score 15   Q9: Thoughts of better off dead/self-harm past 2 weeks Several days         Follow Up      Follow Up Actions Taken  Referred patient back to mental health provider  Patient to follow up with PCP.  Clinic staff to schedule appointment if able.    Discussed the following ways the patient can remain in a safe environment:  remove alcohol, remove drugs, remove access to firearms:   and be around others      No follow-ups on file.    Eliseo Benitez PA-C  [facility]   Kwaku is a 35 year old who presents for the following health issues    HPI     Anxiety Follow-Up    How are you doing with your anxiety since your last visit? Worsened having PTSD symptoms past month. Unable to go to work due to  anxiety being so high.     Are you having other symptoms that might be associated with anxiety? Yes:  panic attacks, night terrors, flash backs of dead people, fearful that will have a breakdown if needs to go to another call. not sleeping well, drinking excessively..    Have you had a significant life event? No     Are you feeling depressed? Yes:  see above    Do you have any concerns with your use of alcohol or other drugs? Yes:  alcohol use.    Social History     Tobacco Use     Smoking status: Former Smoker     Packs/day: 0.00     Years: 0.00     Pack years: 0.00     Types: Cigarettes     Smokeless tobacco: Former User     Types: Chew     Quit date: 4/17/2018   Substance Use Topics     Alcohol use: Yes     Alcohol/week: 0.0 standard drinks     Comment: occasional     Drug use: No     ELY-7 SCORE 10/20/2020 2/17/2021 4/8/2021   Total Score - - -   Total Score - - -   Total Score 4 4 16     PHQ 2/1/2021 2/17/2021 4/8/2021   PHQ-9 Total Score 5 6 15   Q9: Thoughts of better off dead/self-harm past 2 weeks Not at all Not at all Several days     Kwaku has had a return of anxiety, depression and PTSD symptoms.  He has been missing work due to this.  Was seen by his therapist this week and he is wondering about taking some FMLA time off to sort things out.    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      Objective           Vitals:  No vitals were obtained today due to virtual visit.    Physical Exam   GENERAL: Healthy, alert and no distress  EYES: Eyes grossly normal to inspection.  No discharge or erythema, or obvious scleral/conjunctival abnormalities.  RESP: No audible wheeze, cough, or visible cyanosis.  No visible retractions or increased work of breathing.    SKIN: Visible skin clear. No significant rash, abnormal pigmentation or lesions.  NEURO: Cranial  nerves grossly intact.  Mentation and speech appropriate for age.  PSYCH: Mentation appears normal, affect normal/bright, judgement and insight intact, normal speech and appearance well-groomed.          Video-Visit Details    Type of service:  Video Visit    Video End Time:1020a    Originating Location (pt. Location): Home    [facility]     Platform used for Video Visit: [employer]

## 2021-04-08 NOTE — Clinical Note
Lance Savage- errol Ames!  I feel for him.  I will fill out FMLA for what ever length he decides.  Even 3 months which is a typical longest length allowed I think for continuous.  He likes therapy with you.  Not sure how often you are seeing him?  Maybe increase if you can?  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    Best,  Eliseo

## 2021-04-08 NOTE — LETTER
April 8, 2021        RE: Harrison Khan        To Whom it May Concern-      Harrison Khan is under my professional care.  Please excuse him from work for the dates of April 6, 2021 through April 13, 2021 due to medical reasons.        Thank you,            Eliseo Benitez PA-C

## 2021-04-09 ASSESSMENT — ANXIETY QUESTIONNAIRES: GAD7 TOTAL SCORE: 16

## 2021-04-14 ENCOUNTER — VIRTUAL VISIT (OUTPATIENT)
Dept: FAMILY MEDICINE | Facility: CLINIC | Age: 36
End: 2021-04-14
Payer: COMMERCIAL

## 2021-04-14 DIAGNOSIS — F43.10 PTSD (POST-TRAUMATIC STRESS DISORDER): Primary | ICD-10-CM

## 2021-04-14 PROCEDURE — 99214 OFFICE O/P EST MOD 30 MIN: CPT | Mod: 95 | Performed by: PHYSICIAN ASSISTANT

## 2021-04-14 ASSESSMENT — ANXIETY QUESTIONNAIRES
GAD7 TOTAL SCORE: 16
2. NOT BEING ABLE TO STOP OR CONTROL WORRYING: NEARLY EVERY DAY
IF YOU CHECKED OFF ANY PROBLEMS ON THIS QUESTIONNAIRE, HOW DIFFICULT HAVE THESE PROBLEMS MADE IT FOR YOU TO DO YOUR WORK, TAKE CARE OF THINGS AT HOME, OR GET ALONG WITH OTHER PEOPLE: EXTREMELY DIFFICULT
5. BEING SO RESTLESS THAT IT IS HARD TO SIT STILL: NOT AT ALL
1. FEELING NERVOUS, ANXIOUS, OR ON EDGE: NEARLY EVERY DAY
3. WORRYING TOO MUCH ABOUT DIFFERENT THINGS: NEARLY EVERY DAY
7. FEELING AFRAID AS IF SOMETHING AWFUL MIGHT HAPPEN: SEVERAL DAYS
6. BECOMING EASILY ANNOYED OR IRRITABLE: NEARLY EVERY DAY

## 2021-04-14 ASSESSMENT — PATIENT HEALTH QUESTIONNAIRE - PHQ9
5. POOR APPETITE OR OVEREATING: NEARLY EVERY DAY
SUM OF ALL RESPONSES TO PHQ QUESTIONS 1-9: 16

## 2021-04-14 NOTE — PROGRESS NOTES
Kwaku is a 35 year old who is being evaluated via a billable video visit.      How would you like to obtain your AVS? MyChart  If the video visit is dropped, the invitation should be resent by: Text to cell phone: [phone]  Will anyone else be joining your video visit? 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  Stay at 20mg Celexa and ok to continue Ativan for now.  Will plan to decrease hopefully candace when these things start helping.  Follow up one month.    Depression Screening Follow Up    PHQ 4/14/2021   PHQ-9 Total Score 16   Q9: Thoughts of better off dead/self-harm past 2 weeks Several days         Follow Up  Follow Up Actions Taken  Crisis resource information provided in the After Visit Summary  pt will be seeing therapist also    Discussed the following ways the patient can remain in a safe environment:  remove access to firearms: they are locked right now and he will remove them from the home if needed and be around others      Return in about 1 month (around 5/14/2021) for depression/anxiety med check.    Eliseo Benitez PA-C  [facility]   Kwaku is a 35 year old who presents for the following health issues     HPI     Depression and Anxiety Follow-Up    How are you doing with your depression since your last visit? No change    How are you doing with your anxiety since your last visit?  No change    Are you having other symptoms that might be associated with depression or anxiety? Yes:  problems sleeping    Have you had a significant life event? No     Do you have any concerns with your use of alcohol or other drugs? No    Social History     Tobacco Use     Smoking status: Former Smoker     Packs/day: 0.00     Years: 0.00     Pack years: 0.00     Types: Cigarettes      Smokeless tobacco: Former User     Types: Chew     Quit date: 4/17/2018   Substance Use Topics     Alcohol use: Yes     Alcohol/week: 0.0 standard drinks     Comment: occasional     Drug use: No     PHQ 2/17/2021 4/8/2021 4/14/2021   PHQ-9 Total Score 6 15 16   Q9: Thoughts of better off dead/self-harm past 2 weeks Not at all Several days Several days     ELY-7 SCORE 2/17/2021 4/8/2021 4/14/2021   Total Score - - -   Total Score - - -   Total Score 4 16 16     Last PHQ-9 4/14/2021   1.  Little interest or pleasure in doing things 3   2.  Feeling down, depressed, or hopeless 3   3.  Trouble falling or staying asleep, or sleeping too much 3   4.  Feeling tired or having little energy 3   5.  Poor appetite or overeating 1   6.  Feeling bad about yourself 1   7.  Trouble concentrating 1   8.  Moving slowly or restless 0   Q9: Thoughts of better off dead/self-harm past 2 weeks 1   PHQ-9 Total Score 16   Difficulty at work, home, or with people Extremely dIfficult     ELY-7  4/14/2021   1. Feeling nervous, anxious, or on edge 3   2. Not being able to stop or control worrying 3   3. Worrying too much about different things 3   4. Trouble relaxing 3   5. Being so restless that it is hard to sit still 0   6. Becoming easily annoyed or irritable 3   7. Feeling afraid, as if something awful might happen 1   ELY-7 Total Score 16   If you checked any problems, how difficult have they made it for you to do your work, take care of things at home, or get along with other people? Extremely difficult       One week check in.  Finishing FMLA papers.  Update: therapy appointment today.  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      Review of Systems   Constitutional, HEENT, cardiovascular, pulmonary, gi and gu systems are negative, except as otherwise noted.     "  Objective    Vitals - Patient Reported  Weight (Patient Reported): 129.3 kg (285 lb)  Height (Patient Reported): 177.8 cm (5' 10\")  BMI (Based on Pt Reported Ht/Wt): 40.89  Pulse (Patient Reported): 72      Vitals:  No vitals were obtained today due to virtual visit.    Physical Exam   GENERAL: Healthy, alert and no distress  PSYCH: Mentation appears normal, affect normal/bright, judgement and insight intact, normal speech.            Video-Visit Details    Type of service:  Video Visit    Total Time:12 minutes    Originating Location (pt. Location): Home    Distant Location (provider location):  [facility]     Platform used for Video Visit: Unable to complete video visit     12 minutes  "

## 2021-04-15 ASSESSMENT — ANXIETY QUESTIONNAIRES: GAD7 TOTAL SCORE: 16

## 2021-04-28 ENCOUNTER — MYC REFILL (OUTPATIENT)
Dept: FAMILY MEDICINE | Facility: CLINIC | Age: 36
End: 2021-04-28

## 2021-04-28 DIAGNOSIS — F41.9 ANXIETY: ICD-10-CM

## 2021-04-28 DIAGNOSIS — F41.0 PANIC DISORDER WITHOUT AGORAPHOBIA: Primary | ICD-10-CM

## 2021-04-29 RX ORDER — CLONAZEPAM 1 MG/1
.5-1 TABLET ORAL 2 TIMES DAILY
Qty: 60 TABLET | Refills: 0 | Status: SHIPPED | OUTPATIENT
Start: 2021-04-29 | End: 2021-04-30

## 2021-04-29 RX ORDER — LORAZEPAM 1 MG/1
1 TABLET ORAL EVERY 8 HOURS PRN
Qty: 30 TABLET | Refills: 0 | OUTPATIENT
Start: 2021-04-29

## 2021-04-29 NOTE — TELEPHONE ENCOUNTER
LORazepam (ATIVAN) 1 MG tablet  Last Written Prescription Date:  4/8/21  Last Fill Quantity: 30,   # refills: 0  Last Office Visit: 4/14/21  Future Office visit:       Routing refill request to provider for review/approval because:  Drug not on the [facility], [facility] or [facility] refill protocol or controlled substance      Patient comment: Luis Alberto Gomes I know we had talk about this med, but I am having full blown anxiety attacks at least twice a day. I have a flash back or nightmare... I have really bad anxiety attacks that last several hours    Vinita Pollard RN on 4/29/2021 at 9:00 AM

## 2021-04-30 ENCOUNTER — TELEPHONE (OUTPATIENT)
Dept: NURSING | Facility: CLINIC | Age: 36
End: 2021-04-30

## 2021-04-30 DIAGNOSIS — F41.9 ANXIETY: ICD-10-CM

## 2021-04-30 DIAGNOSIS — F41.0 PANIC DISORDER WITHOUT AGORAPHOBIA: ICD-10-CM

## 2021-04-30 RX ORDER — CLONAZEPAM 1 MG/1
.5-1 TABLET ORAL 2 TIMES DAILY
Qty: 60 TABLET | Refills: 0 | Status: SHIPPED | OUTPATIENT
Start: 2021-04-30 | End: 2021-06-02

## 2021-04-30 NOTE — TELEPHONE ENCOUNTER
Patient calling, states [facility] notified that they are unable to fill the clonazepam prescription sent in yesterday by Eliseo Benitez because there is a problem with the NAVA number being incorrect or inactive. Please call him when this is resolved.  Mala Petty RN  [facility]     No

## 2021-04-30 NOTE — TELEPHONE ENCOUNTER
I can order it, her last appt note says stay on ativan for now. Ativan was given 4/8/2021, 30 tabs.  He then called back with twice daily panic attacks and PTSD, Eliseo then offered twice daily clonazepam 60tabs which is a higher dose and a different med.  Will order. Follow up as planned.   checked as well

## 2021-04-30 NOTE — TELEPHONE ENCOUNTER
Called pharmacy, explained that Eliseo VICK is up to date.  They tried to run Rx again, still NOT going through. I asked them if they could use Dr. Bhavna Viera's NAVA instead.  They said we need to send a NEW prescription because it is a controlled substance.      Dr. Viera would you please send a NEW prescription over to [facility] for this patient? Please advise.    Lindsay Castro MA      Rx T'd up for provider to sign.

## 2021-05-03 ENCOUNTER — TRANSFERRED RECORDS (OUTPATIENT)
Dept: HEALTH INFORMATION MANAGEMENT | Facility: CLINIC | Age: 36
End: 2021-05-03

## 2021-05-03 DIAGNOSIS — F41.9 ANXIETY: ICD-10-CM

## 2021-05-03 DIAGNOSIS — F51.04 CHRONIC INSOMNIA: ICD-10-CM

## 2021-05-03 RX ORDER — ZALEPLON 5 MG/1
CAPSULE ORAL
Qty: 90 CAPSULE | Refills: 3 | Status: SHIPPED | OUTPATIENT
Start: 2021-05-03 | End: 2021-06-02

## 2021-05-03 RX ORDER — CITALOPRAM HYDROBROMIDE 10 MG/1
TABLET ORAL
Qty: 90 TABLET | Refills: 0 | OUTPATIENT
Start: 2021-05-03

## 2021-05-03 NOTE — TELEPHONE ENCOUNTER
zaleplon (SONATA) 5 MG capsule      Last Written Prescription Date:  1/5/2021  Last Fill Quantity: 90,   # refills: 3  Last Office Visit: 4/14/2021  (VIRTUAL VISIT)  Future Office visit:       Routing refill request to provider for review/approval because:  Drug not on the [facility], [facility] or [facility] refill protocol or controlled substance,    Concha Vital RN

## 2021-05-04 RX ORDER — DOXEPIN HYDROCHLORIDE 25 MG/1
CAPSULE ORAL
Qty: 180 CAPSULE | Refills: 2 | Status: SHIPPED | OUTPATIENT
Start: 2021-05-04 | End: 2021-09-27

## 2021-05-17 ENCOUNTER — VIRTUAL VISIT (OUTPATIENT)
Dept: FAMILY MEDICINE | Facility: CLINIC | Age: 36
End: 2021-05-17
Payer: COMMERCIAL

## 2021-05-17 DIAGNOSIS — F43.10 PTSD (POST-TRAUMATIC STRESS DISORDER): Primary | ICD-10-CM

## 2021-05-17 DIAGNOSIS — F10.20 UNCOMPLICATED ALCOHOL DEPENDENCE (H): ICD-10-CM

## 2021-05-17 PROBLEM — J30.2 SEASONAL ALLERGIC RHINITIS: Status: ACTIVE | Noted: 2019-12-19

## 2021-05-17 PROCEDURE — 96127 BRIEF EMOTIONAL/BEHAV ASSMT: CPT | Mod: 95 | Performed by: PHYSICIAN ASSISTANT

## 2021-05-17 PROCEDURE — 99214 OFFICE O/P EST MOD 30 MIN: CPT | Mod: 95 | Performed by: PHYSICIAN ASSISTANT

## 2021-05-17 RX ORDER — NALTREXONE HYDROCHLORIDE 50 MG/1
50 TABLET, FILM COATED ORAL DAILY
Qty: 90 TABLET | Refills: 0 | Status: SHIPPED | OUTPATIENT
Start: 2021-05-17 | End: 2021-07-21 | Stop reason: SINTOL

## 2021-05-17 ASSESSMENT — PATIENT HEALTH QUESTIONNAIRE - PHQ9
SUM OF ALL RESPONSES TO PHQ QUESTIONS 1-9: 13
5. POOR APPETITE OR OVEREATING: NEARLY EVERY DAY

## 2021-05-17 ASSESSMENT — ANXIETY QUESTIONNAIRES
GAD7 TOTAL SCORE: 15
IF YOU CHECKED OFF ANY PROBLEMS ON THIS QUESTIONNAIRE, HOW DIFFICULT HAVE THESE PROBLEMS MADE IT FOR YOU TO DO YOUR WORK, TAKE CARE OF THINGS AT HOME, OR GET ALONG WITH OTHER PEOPLE: VERY DIFFICULT
6. BECOMING EASILY ANNOYED OR IRRITABLE: NEARLY EVERY DAY
7. FEELING AFRAID AS IF SOMETHING AWFUL MIGHT HAPPEN: SEVERAL DAYS
1. FEELING NERVOUS, ANXIOUS, OR ON EDGE: NEARLY EVERY DAY
3. WORRYING TOO MUCH ABOUT DIFFERENT THINGS: NEARLY EVERY DAY
5. BEING SO RESTLESS THAT IT IS HARD TO SIT STILL: SEVERAL DAYS
2. NOT BEING ABLE TO STOP OR CONTROL WORRYING: SEVERAL DAYS

## 2021-05-17 NOTE — PROGRESS NOTES
"Kwaku is a 35 year old who is being evaluated via a billable telephone visit.      What phone number would you like to be contacted at? [phone]  How would you like to obtain your AVS? 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  He is currently managing this with a therapist and medications.  He is feeling stable right now.  Will follow up on this in a month- and fill out any needed forms for his YOGESH.    Uncomplicated alcohol dependence (H)  To try for alcohol use.  He has a plan in place with the therapist as well.  Discussed mechanism of action of medication, proper dosing, potential side effects and appropriate follow up.    - naltrexone (DEPADE/REVIA) 50 MG tablet; Take 1 tablet (50 mg) by mouth daily       BMI:   Estimated body mass index is 39.74 kg/m  as calculated from the following:    Height as of 6/11/20: 1.765 m (5' 9.5\").    Weight as of 6/11/20: 123.8 kg (273 lb).   Weight management plan: Discussed healthy diet and exercise guidelines    Depression Screening Follow Up    PHQ 5/17/2021   PHQ-9 Total Score 13   Q9: Thoughts of better off dead/self-harm past 2 weeks Several days         Follow Up      Follow Up Actions Taken  Referred patient back to mental health provider  Patient to follow up with PCP.  Clinic staff to schedule appointment if able.    Discussed the following ways the patient can remain in a safe environment:  remove alcohol, remove access to firearms:   and be around others      Return in about 1 month (around 6/17/2021) for depression/anxiety med check.    Eliseo Benitez PA-C  [facility]    Subjective   Kwaku is a 35 year old who presents for the following health issues     HPI     Depression and Anxiety Follow-Up    How are you doing with your depression since your last visit? No change- same    How are you doing with your anxiety since your " last visit?  No change- same    Are you having other symptoms that might be associated with depression or anxiety? No    Have you had a significant life event? No     Do you have any concerns with your use of alcohol or other drugs? Yes:  alcohol    Social History     Tobacco Use     Smoking status: Former Smoker     Packs/day: 0.00     Years: 0.00     Pack years: 0.00     Types: Cigarettes     Smokeless tobacco: Former User     Types: Chew     Quit date: 4/17/2018   Substance Use Topics     Alcohol use: Yes     Alcohol/week: 0.0 standard drinks     Comment: daily-5-6 mix drinks per day.      Drug use: No     PHQ 4/8/2021 4/14/2021 5/17/2021   PHQ-9 Total Score 15 16 13   Q9: Thoughts of better off dead/self-harm past 2 weeks Several days Several days Several days     ELY-7 SCORE 4/8/2021 4/14/2021 5/17/2021   Total Score - - -   Total Score - - -   Total Score 16 16 15     Clonazepam is working better than ativan.  Taking once daily only.  Working with trauma therapist once per week.  Working also on PTSD longterm with a - did an eval with their psychologist and was dx with PTSD.   may need to contact us (MD needs to sign however).    Drinking 5-6 mixed drinks per day.  This has been going on for some time now.  Working with the therapist on how to stop drinking.      Suicide Assessment Five-step Evaluation and Treatment (SAFE-T)        How many servings of fruits and vegetables do you eat daily?  2-3    On average, how many sweetened beverages do you drink each day (Examples: soda, juice, sweet tea, etc.  Do NOT count diet or artificially sweetened beverages)?   0    How many days per week do you exercise enough to make your heart beat faster? 7    How many minutes a day do you exercise enough to make your heart beat faster? 60 or more    How many days per week do you miss taking your medication? 0        Review of Systems   Constitutional, HEENT, cardiovascular, pulmonary, gi and gu systems are  negative, except as otherwise noted.      Objective           Vitals:  No vitals were obtained today due to virtual visit.    Physical Exam   healthy, alert and no distress  PSYCH: Alert and oriented times 3; coherent speech, normal   rate and volume, able to articulate logical thoughts, able   to abstract reason, no tangential thoughts, no hallucinations   or delusions  His affect is normal and pleasant  RESP: No cough, no audible wheezing, able to talk in full sentences  Remainder of exam unable to be completed due to telephone visits            Phone call duration: 11 minutes

## 2021-05-18 ASSESSMENT — ANXIETY QUESTIONNAIRES: GAD7 TOTAL SCORE: 15

## 2021-06-02 ENCOUNTER — OFFICE VISIT (OUTPATIENT)
Dept: FAMILY MEDICINE | Facility: CLINIC | Age: 36
End: 2021-06-02
Payer: COMMERCIAL

## 2021-06-02 VITALS
OXYGEN SATURATION: 95 % | HEART RATE: 110 BPM | DIASTOLIC BLOOD PRESSURE: 78 MMHG | TEMPERATURE: 99.3 F | HEIGHT: 70 IN | SYSTOLIC BLOOD PRESSURE: 136 MMHG | WEIGHT: 294.3 LBS | RESPIRATION RATE: 16 BRPM | BODY MASS INDEX: 42.13 KG/M2

## 2021-06-02 DIAGNOSIS — F51.04 CHRONIC INSOMNIA: ICD-10-CM

## 2021-06-02 DIAGNOSIS — F43.10 PTSD (POST-TRAUMATIC STRESS DISORDER): Primary | ICD-10-CM

## 2021-06-02 DIAGNOSIS — F41.9 ANXIETY: ICD-10-CM

## 2021-06-02 DIAGNOSIS — F41.0 PANIC DISORDER WITHOUT AGORAPHOBIA: ICD-10-CM

## 2021-06-02 PROCEDURE — 99214 OFFICE O/P EST MOD 30 MIN: CPT | Performed by: PHYSICIAN ASSISTANT

## 2021-06-02 RX ORDER — CLONAZEPAM 1 MG/1
.5-1 TABLET ORAL 2 TIMES DAILY
Qty: 60 TABLET | Refills: 0 | Status: SHIPPED | OUTPATIENT
Start: 2021-06-02 | End: 2021-06-27

## 2021-06-02 RX ORDER — ESZOPICLONE 3 MG/1
3 TABLET, FILM COATED ORAL AT BEDTIME
Qty: 30 TABLET | Refills: 0 | Status: SHIPPED | OUTPATIENT
Start: 2021-06-02 | End: 2021-06-27

## 2021-06-02 ASSESSMENT — MIFFLIN-ST. JEOR: SCORE: 2286.19

## 2021-06-02 NOTE — PROGRESS NOTES
Assessment & Plan     PTSD (post-traumatic stress disorder)  Forms filled out today.  Follow up one month.  Continue therapy.    Chronic insomnia  To try for sleep.  Follow up one month, sooner prn.  - eszopiclone (LUNESTA) 3 MG tablet; Take 1 tablet (3 mg) by mouth At Bedtime    Anxiety  Refilled- this seems to be working better than Ativan and he is then able to take fewer in a day.  - clonazePAM (KLONOPIN) 1 MG tablet; Take 0.5-1 tablets (0.5-1 mg) by mouth 2 times daily    Panic disorder without agoraphobia    - clonazePAM (KLONOPIN) 1 MG tablet; Take 0.5-1 tablets (0.5-1 mg) by mouth 2 times daily      Return in about 1 month (around 7/2/2021) for depression/anxiety med check.    Eliseo Benitez PA-C  [facility]    Subjective   Kwaku is a 35 year old who presents for the following health issues:    Workers comp and has some legal papers that need to be completed.    Has been on FMLA since April 8th.  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    We are signing to confirm PTSD diganosis and that his is unable to continue work as .  I can sign but MD is needed to co-sign the papers as well.    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  He has had a number of incidents involving scenes of death and images that he cannot get out of his mind.  These directly resulted in a diagnosis of PTSD and Kwaku's inability to continue working.    Sleep- Kwaku is still not sleeping.  He has tried many different meds for this.  Currently Sonata is not helping any more.  Wondering about a change again.  Sleep Medicine has never been helpful.  Has done sleep therapy.  Is also currently in therapy.  He has been drinking to put himself to sleep.  Naltrexone seems to work a bit but makes him  "feel \"really weird\".      HPI     Review of Systems   Constitutional, HEENT, cardiovascular, pulmonary, gi and gu systems are negative, except as otherwise noted.      Objective    /78   Pulse 110   Temp 99.3  F (37.4  C) (Oral)   Resp 16   Ht 1.778 m (5' 10\")   Wt 133.5 kg (294 lb 4.8 oz)   SpO2 95%   BMI 42.23 kg/m    Body mass index is 42.23 kg/m .  Physical Exam   GENERAL: healthy, alert and no distress  MS: no gross musculoskeletal defects noted, no edema  SKIN: no suspicious lesions or rashes  PSYCH: mentation appears normal, affect normal/bright            "

## 2021-06-02 NOTE — LETTER
June 2, 2021      Harrison Khan  [address] [facility] [address]        To Whom It May Concern:    Harrison Khan was seen on June 2, 2021.  He is under my professional care.  Based on current medical issues he cannot perform job duties.  He should continue seeing me and mental health provider.  Please excuse him until September 30, 2021 due to the above reasons stated.        Sincerely,        Eliseo Benitez PA-C

## 2021-06-07 NOTE — PROGRESS NOTES
Called patient, left message that forms are ready and at the FD. If he wants them sent differently to call us back.    Lindsay Castro MA

## 2021-06-27 ENCOUNTER — MYC REFILL (OUTPATIENT)
Dept: FAMILY MEDICINE | Facility: CLINIC | Age: 36
End: 2021-06-27

## 2021-06-27 DIAGNOSIS — F51.04 CHRONIC INSOMNIA: ICD-10-CM

## 2021-06-27 DIAGNOSIS — F41.9 ANXIETY: ICD-10-CM

## 2021-06-27 DIAGNOSIS — F41.0 PANIC DISORDER WITHOUT AGORAPHOBIA: ICD-10-CM

## 2021-06-28 NOTE — TELEPHONE ENCOUNTER
Lunesta 3mg      Last Written Prescription Date:  6/2/2021  Last Fill Quantity: 30,   # refills: 0  Last Office Visit: 6/2/2021    Clonazepam 1mg      Last Written Prescription Date:  6/2/2021  Last Fill Quantity: 60,   # refills: 0  Last Office Visit: 6/2/2021    Future Office visit:    Next 5 appointments (look out 90 days)    Jul 21, 2021  2:15 PM  Glynn Ace with Eliseo Benitez PA-C  [facility] ([facility] ) [address] [place] [id number] [phone]       Routing refill request to provider for review/approval because:  Drug not on the [facility], [facility] or [facility] refill protocol or controlled substance     Concha Vital RN

## 2021-06-29 ENCOUNTER — MYC REFILL (OUTPATIENT)
Dept: FAMILY MEDICINE | Facility: CLINIC | Age: 36
End: 2021-06-29

## 2021-06-29 DIAGNOSIS — F51.04 CHRONIC INSOMNIA: ICD-10-CM

## 2021-06-29 DIAGNOSIS — F41.0 PANIC DISORDER WITHOUT AGORAPHOBIA: ICD-10-CM

## 2021-06-29 DIAGNOSIS — F41.9 ANXIETY: ICD-10-CM

## 2021-06-29 RX ORDER — CLONAZEPAM 1 MG/1
.5-1 TABLET ORAL 2 TIMES DAILY
Qty: 60 TABLET | Refills: 0 | Status: SHIPPED | OUTPATIENT
Start: 2021-06-29 | End: 2021-07-21

## 2021-06-29 RX ORDER — ESZOPICLONE 3 MG/1
3 TABLET, FILM COATED ORAL AT BEDTIME
Qty: 30 TABLET | Refills: 0 | Status: SHIPPED | OUTPATIENT
Start: 2021-06-29 | End: 2021-07-21

## 2021-06-29 RX ORDER — ESZOPICLONE 3 MG/1
3 TABLET, FILM COATED ORAL AT BEDTIME
Qty: 30 TABLET | Refills: 0 | Status: CANCELLED | OUTPATIENT
Start: 2021-06-29

## 2021-06-29 RX ORDER — CLONAZEPAM 1 MG/1
.5-1 TABLET ORAL 2 TIMES DAILY
Qty: 60 TABLET | Refills: 0 | Status: CANCELLED | OUTPATIENT
Start: 2021-06-29

## 2021-06-30 ENCOUNTER — TELEPHONE (OUTPATIENT)
Dept: FAMILY MEDICINE | Facility: CLINIC | Age: 36
End: 2021-06-30

## 2021-06-30 NOTE — TELEPHONE ENCOUNTER
Dr. Garrido's office calling in regards to a disability case. They would like a return call to discuss further about her opinion on his ability to return to work. Please call at [phone].  -Carol Vasquez

## 2021-07-07 ENCOUNTER — TELEPHONE (OUTPATIENT)
Dept: FAMILY MEDICINE | Facility: CLINIC | Age: 36
End: 2021-07-07

## 2021-07-07 NOTE — TELEPHONE ENCOUNTER
Reason for Call:  Other call back    Detailed comments: Dr. Garrido would like to do a peer to peer with Eliseo GODWIN for a disablility case    Phone Number Patient can be reached at: Other phone number:  [phone]    Best Time: any    Can we leave a detailed message on this number? YES    Call taken on 7/7/2021 at 2:53 PM by Shiresa H. Ormond

## 2021-07-12 NOTE — TELEPHONE ENCOUNTER
Spoke with Jose Carlos, Dr. Rachna Garrido would like to do a peer to peer on Wednesday at 1 pm CDT, phone number [phone].     Becky Mason-

## 2021-07-14 NOTE — TELEPHONE ENCOUNTER
Spoke to the calling provider today regarding Kwaku's situation.  Dr Garrido is a third party reviewer for insurance purposes.  Answered her questions to the best of my ability.      Eliseo Benitez PA-C

## 2021-07-21 ENCOUNTER — OFFICE VISIT (OUTPATIENT)
Dept: FAMILY MEDICINE | Facility: CLINIC | Age: 36
End: 2021-07-21
Payer: COMMERCIAL

## 2021-07-21 VITALS
HEART RATE: 102 BPM | SYSTOLIC BLOOD PRESSURE: 138 MMHG | DIASTOLIC BLOOD PRESSURE: 78 MMHG | HEIGHT: 70 IN | TEMPERATURE: 98.2 F | RESPIRATION RATE: 15 BRPM | BODY MASS INDEX: 42.36 KG/M2 | OXYGEN SATURATION: 95 % | WEIGHT: 295.9 LBS

## 2021-07-21 DIAGNOSIS — F41.0 PANIC DISORDER WITHOUT AGORAPHOBIA: ICD-10-CM

## 2021-07-21 DIAGNOSIS — F51.04 CHRONIC INSOMNIA: ICD-10-CM

## 2021-07-21 DIAGNOSIS — F10.20 UNCOMPLICATED ALCOHOL DEPENDENCE (H): ICD-10-CM

## 2021-07-21 DIAGNOSIS — F43.10 PTSD (POST-TRAUMATIC STRESS DISORDER): Primary | ICD-10-CM

## 2021-07-21 DIAGNOSIS — F41.9 ANXIETY: ICD-10-CM

## 2021-07-21 PROCEDURE — 99214 OFFICE O/P EST MOD 30 MIN: CPT | Performed by: PHYSICIAN ASSISTANT

## 2021-07-21 PROCEDURE — 99207 E-CONSULT TO BEHAVIORAL HEALTH (ADULT OUTPT PROVIDER TO SPECIALIST WRITTEN QUESTION & RESPONSE): CPT | Performed by: PHYSICIAN ASSISTANT

## 2021-07-21 RX ORDER — CITALOPRAM HYDROBROMIDE 20 MG/1
30 TABLET ORAL DAILY
Qty: 90 TABLET | Refills: 0 | COMMUNITY
Start: 2021-07-21 | End: 2021-07-27

## 2021-07-21 RX ORDER — CLONAZEPAM 1 MG/1
.5-1 TABLET ORAL 2 TIMES DAILY
Qty: 60 TABLET | Refills: 0 | Status: SHIPPED | OUTPATIENT
Start: 2021-07-29 | End: 2021-07-27

## 2021-07-21 RX ORDER — ESZOPICLONE 3 MG/1
3 TABLET, FILM COATED ORAL AT BEDTIME
Qty: 30 TABLET | Refills: 3 | Status: SHIPPED | OUTPATIENT
Start: 2021-07-29 | End: 2021-09-27

## 2021-07-21 RX ORDER — GABAPENTIN 300 MG/1
CAPSULE ORAL
Qty: 90 CAPSULE | Refills: 0 | Status: SHIPPED | OUTPATIENT
Start: 2021-07-21 | End: 2021-08-12

## 2021-07-21 ASSESSMENT — ANXIETY QUESTIONNAIRES
2. NOT BEING ABLE TO STOP OR CONTROL WORRYING: NEARLY EVERY DAY
6. BECOMING EASILY ANNOYED OR IRRITABLE: MORE THAN HALF THE DAYS
7. FEELING AFRAID AS IF SOMETHING AWFUL MIGHT HAPPEN: MORE THAN HALF THE DAYS
1. FEELING NERVOUS, ANXIOUS, OR ON EDGE: NEARLY EVERY DAY
GAD7 TOTAL SCORE: 17
3. WORRYING TOO MUCH ABOUT DIFFERENT THINGS: NEARLY EVERY DAY
5. BEING SO RESTLESS THAT IT IS HARD TO SIT STILL: SEVERAL DAYS
IF YOU CHECKED OFF ANY PROBLEMS ON THIS QUESTIONNAIRE, HOW DIFFICULT HAVE THESE PROBLEMS MADE IT FOR YOU TO DO YOUR WORK, TAKE CARE OF THINGS AT HOME, OR GET ALONG WITH OTHER PEOPLE: EXTREMELY DIFFICULT

## 2021-07-21 ASSESSMENT — PATIENT HEALTH QUESTIONNAIRE - PHQ9
SUM OF ALL RESPONSES TO PHQ QUESTIONS 1-9: 17
5. POOR APPETITE OR OVEREATING: NEARLY EVERY DAY

## 2021-07-21 ASSESSMENT — MIFFLIN-ST. JEOR: SCORE: 2288.44

## 2021-07-21 NOTE — PROGRESS NOTES
Assessment & Plan     Anxiety  Increase Celexa to 30mg daily by taking 1.5 tabs- can refill when needed.  Add gabapentin, taper up to effective dose.  Follow up one month, sooner if concerns.  E-consult to Psych- we discussed that he has not actually ever seen Psych and this is a good option right now.    - gabapentin (NEURONTIN) 300 MG capsule; Start with 300mg at night, then add AM dose and can add mid day dose if needed.  Up to 300mg TID.  - clonazePAM (KLONOPIN) 1 MG tablet; Take 0.5-1 tablets (0.5-1 mg) by mouth 2 times daily  - E-CONSULT TO BEHAVIORAL HEALTH (ADULT OUTPT PROVIDER TO SPECIALIST WRITTEN QUESTION & RESPONSE)  - citalopram (CELEXA) 20 MG tablet; Take 1.5 tablets (30 mg) by mouth daily    Panic disorder without agoraphobia  Refilled- due next week.  - clonazePAM (KLONOPIN) 1 MG tablet; Take 0.5-1 tablets (0.5-1 mg) by mouth 2 times daily    Chronic insomnia  Have not yet tried gabapentin.  Start at 300mg at bedtime (daytime doses if needed for anxiety).  Consider higher doses at night if this is not working.  E-consult.  Rx refill for next week.  - gabapentin (NEURONTIN) 300 MG capsule; Start with 300mg at night, then add AM dose and can add mid day dose if needed.  Up to 300mg TID.  - eszopiclone (LUNESTA) 3 MG tablet; Take 1 tablet (3 mg) by mouth At Bedtime  - E-CONSULT TO BEHAVIORAL HEALTH (ADULT OUTPT PROVIDER TO SPECIALIST WRITTEN QUESTION & RESPONSE)    PTSD (post-traumatic stress disorder)  See above.  Still seeing therapist weekly.  - E-CONSULT TO BEHAVIORAL HEALTH (ADULT OUTPT PROVIDER TO SPECIALIST WRITTEN QUESTION & RESPONSE)    Uncomplicated alcohol dependence (H)  Stop Naltrexone- did not tolerate due to GI side effects.  Gabapentin perhaps will also help with this.   Maybe will need AA or other treatment down the road.  - E-CONSULT TO BEHAVIORAL HEALTH (ADULT OUTPT PROVIDER TO SPECIALIST WRITTEN QUESTION & RESPONSE)         Tobacco Cessation:   reports that he has been smoking  "cigarettes. He has been smoking about 0.50 packs per day for the past 0.00 years. He quit smokeless tobacco use about 3 years ago.  His smokeless tobacco use included chew.  Tobacco Cessation Action Plan: Information offered: Patient not interested at this time    Depression Screening Follow Up    PHQ 7/21/2021   PHQ-9 Total Score 17   Q9: Thoughts of better off dead/self-harm past 2 weeks More than half the days       Follow Up      Follow Up Actions Taken  Referred patient back to mental health provider       Discussed the following ways the patient can remain in a safe environment:  remove alcohol, secure medications:  , remove access to firearms:   and be around others      Return in about 1 month (around 8/21/2021) for depression/anxiety med check.    Eliseo Benitez PA-C  [facility]    Estuardo Ames is a 36 year old who presents for the following health issues     HPI     Has work related forms- would like to discuss extension to YOGESH. 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      Today Kwaku states they are asking him to see me monthly for paperwork and check in.  He is still having anxiety, panic, PTSD.  Notes suicidal thinking \"just better off not here\" but no specific plan.  Guns at home are locked and  has the key hidden.  Still turing to etoh too much according to him.  Not sleeping which is not new.  Therapy once weekly.      Depression and Anxiety Follow-Up    How are you doing with your depression since your last visit? No change    How are you doing with your anxiety since your last visit?  No change    Are you having other symptoms that might be associated with depression or anxiety? No    Have you had a significant life event? No     Do you have any concerns with your use of alcohol or other drugs? Yes:  alcohol    Social History     Tobacco Use     Smoking status: Current Every Day Smoker     Packs/day: 0.50     Years: 0.00     Pack " "years: 0.00     Types: Cigarettes     Smokeless tobacco: Former User     Types: Chew     Quit date: 4/17/2018   Vaping Use     Vaping Use: Never used   Substance Use Topics     Alcohol use: Yes     Alcohol/week: 0.0 standard drinks     Comment: daily-5-6 mix drinks per day.      Drug use: No     PHQ 4/14/2021 5/17/2021 7/21/2021   PHQ-9 Total Score 16 13 17   Q9: Thoughts of better off dead/self-harm past 2 weeks Several days Several days More than half the days     ELY-7 SCORE 4/14/2021 5/17/2021 7/21/2021   Total Score - - -   Total Score - - -   Total Score 16 15 17       How many servings of fruits and vegetables do you eat daily?  0-1    On average, how many sweetened beverages do you drink each day (Examples: soda, juice, sweet tea, etc.  Do NOT count diet or artificially sweetened beverages)?   0    How many days per week do you exercise enough to make your heart beat faster? 6    How many minutes a day do you exercise enough to make your heart beat faster? 60 or more    How many days per week do you miss taking your medication? 0        Review of Systems   Constitutional, HEENT, cardiovascular, pulmonary, gi and gu systems are negative, except as otherwise noted.      Objective    /78 (BP Location: Right arm, Patient Position: Sitting, Cuff Size: Adult Regular)   Pulse 102   Temp 98.2  F (36.8  C) (Oral)   Resp 15   Ht 1.778 m (5' 10\")   Wt 134.2 kg (295 lb 14.4 oz)   SpO2 95%   BMI 42.46 kg/m    Body mass index is 42.46 kg/m .  Physical Exam   GENERAL: healthy, alert and no distress  PSYCH: mentation appears normal, affect normal/bright            "

## 2021-07-22 ENCOUNTER — E-CONSULT (OUTPATIENT)
Dept: BEHAVIORAL HEALTH | Facility: CLINIC | Age: 36
End: 2021-07-22
Payer: COMMERCIAL

## 2021-07-22 PROCEDURE — 99451 NTRPROF PH1/NTRNET/EHR 5/>: CPT | Performed by: PSYCHIATRY & NEUROLOGY

## 2021-07-22 ASSESSMENT — ANXIETY QUESTIONNAIRES: GAD7 TOTAL SCORE: 17

## 2021-07-22 NOTE — PROGRESS NOTES
ALL SMARTFIELDS MUST BE COMPLETED FOR PATIENT CARE AND BILLING    7/22/2021     E-Consult has been accepted.    Interprofessional consultation requested by:  Eliseo Benitez PA-C      Clinical Question/Purpose: MY CLINICAL QUESTION IS: Kwaku has been a patient of mine for a while now.  Dx with anxiety and insomnia.  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  His is having panic attacks now and chronic insomnia.  We have tried every medication I can think of, some of which work for a while, some don't work at all.  He has done therapy over the years and is currently seeing someone weekly that specializes with law enforcement officers.  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  Right now I'd love a review of his medications as well as any advice regarding sleep.  He has been turning to alcohol as a coping mechanism for this.  He has seen sleep medicine and done sleep therapy.  I am increasing his Celexa today and adding gabapentin which we have not tried yet.  Today notes suicidal thoughts but no plan.  All weapons in the home are locked and his  has the lopez.  Thanks!    Patient assessment and information reviewed: Chart review    Recommendations: I am glad that he is getting individual therapy.  I have several thoughts about therapy before going into medications.  He most likely will need a day treatment program or partial hospitalization.  This will be more intense and meet his needs better.  Of note, it is recommended not to use benzos when doing therapy for PTSD.  The evidence is strong that this hinders therapy and decreases the gains one may make in therapy.  As well, with his drinking, this is a dangerous combination.      With the addition of gabapentin, there may not be any need for the benzos.  I really like gabapentin for anxiety,  but it works best as a three times a day medication.  Increasing it to 300 mg tid and stopping klonopin would be the most helpful.  It can be increased up to 1200 mg tid if needed.  Trying to do lower doses during the day and higher doses at nighttime may be a way to help with the sleep.  There is a specific therapy, which he may have done through the sleep clinic, called CBT-I which has been proven to be better than medications.      I suspect his sleep has more to do with his drinking at night (it always makes sleep architecture worse) and his worsening PTSD symptoms.  Is he having nightmares?  This is a common reason people don't sleep well.  If he is, an option of trying prazosin 1 mg at bedtime to help with the nightmares.  It will not help him fall asleep, but if it cuts down or stops the nightmares, his ability to stay asleep will improve.  Often this dose needs to be increased.  Increasing it 1 mg at a time up to 5 mg is safe.  The major side effect will be a drop in blood pressure.  This will need to be monitored but this is usually well tolerated.      I like increasing celexa but would go all the way to 40 mg a day.  With PTSD symptoms, often we need to go to the highest dose of medications.  The hope is improving PTSD symptoms will help with sleep as well.  I could not find a list of medications that he has tried for PTSD, so will give several options.  Remeron is a good option for anxiety/PTSD/insomnia.  It has one major side effect which is weight gain which can be a deal breaker for patients.  Starting at 15 mg with the ability to go up to 45 mg.  The higher the dose, the more help for anxiety/PTSD but less help for sleep.      Effexor xr is a good one for anxiety/PTSD as well.  The major draw back is the high risk for withdrawal symptoms if missing a dose.  Making sure that a patient is good at taking the medications every day is important for this medication.  Starting at 75 mg and after a week  going to 150 mg.  Increasing to 225 mg after 4-6 weeks is possible if not getting improvement.      One last thought.  The alcohol is a major issue in both the interaction with medications, the problems it causes with sleep and in the worsening of symptoms that is occurring.  By using alcohol, he is counteracting the celexa and possibly the gabapentin that you are starting, so they are not as effective.  Also, it can take up to 3-6 months for the body to recover from the drinking and get back to a baseline in order to improve sleep, allow the medications to work better and to no see worsening of symptoms due to the alcohol.  It has already been mentioned the danger of having benzos at the same time as alcohol.  It was also mentioned about a day treatment program.  It may be worthwhile to send him to the assessment center through [facility] and see if he is a good fit for a dual MICD day treatment program working on both the PTSD, anxiety and alcohol use.  You can make this referral via the referral in Epic.      Thank you for the consult.          The recommendations provided in this E-Consult are based on the clinical data available to me at this time, and are furnished without the benefit of a comprehensive in-person or virtual patient evaluation, Any new clinical issues or changes in patient status since the filing of this E-Consult will need to be taken into account when assessing these recommendations. Please contact me if you have further questions.    My total time spent reviewing clinical information and formulating assessment was 15 minutes.    Report sent automatically to requesting provider once signed.     Charge codes - 0841007 (5+ minutes) or 10Z5697 (No charge code)    Anshul Dc MD

## 2021-07-27 ENCOUNTER — PATIENT OUTREACH (OUTPATIENT)
Dept: NURSING | Facility: CLINIC | Age: 36
End: 2021-07-27
Attending: PHYSICIAN ASSISTANT
Payer: COMMERCIAL

## 2021-07-27 ENCOUNTER — VIRTUAL VISIT (OUTPATIENT)
Dept: FAMILY MEDICINE | Facility: CLINIC | Age: 36
End: 2021-07-27
Payer: COMMERCIAL

## 2021-07-27 DIAGNOSIS — F41.0 PANIC DISORDER WITHOUT AGORAPHOBIA: ICD-10-CM

## 2021-07-27 DIAGNOSIS — F41.9 ANXIETY: Primary | ICD-10-CM

## 2021-07-27 DIAGNOSIS — F43.10 PTSD (POST-TRAUMATIC STRESS DISORDER): ICD-10-CM

## 2021-07-27 DIAGNOSIS — F10.20 UNCOMPLICATED ALCOHOL DEPENDENCE (H): ICD-10-CM

## 2021-07-27 DIAGNOSIS — F32.1 MODERATE MAJOR DEPRESSION (H): ICD-10-CM

## 2021-07-27 PROCEDURE — 99214 OFFICE O/P EST MOD 30 MIN: CPT | Mod: 95 | Performed by: PHYSICIAN ASSISTANT

## 2021-07-27 RX ORDER — VENLAFAXINE HYDROCHLORIDE 37.5 MG/1
37.5 CAPSULE, EXTENDED RELEASE ORAL DAILY
Qty: 30 CAPSULE | Refills: 0 | Status: SHIPPED | OUTPATIENT
Start: 2021-07-27 | End: 2021-09-08

## 2021-07-27 RX ORDER — CITALOPRAM HYDROBROMIDE 20 MG/1
20 TABLET ORAL DAILY
Qty: 90 TABLET | Refills: 0 | COMMUNITY
Start: 2021-07-27 | End: 2021-08-12

## 2021-07-27 RX ORDER — LORAZEPAM 1 MG/1
1 TABLET ORAL EVERY 6 HOURS PRN
Qty: 20 TABLET | Refills: 0 | Status: SHIPPED | OUTPATIENT
Start: 2021-07-27 | End: 2021-08-23

## 2021-07-27 NOTE — LETTER
[facility]  Complex Care Plan  About Me:    Patient Name:  Harrison Khan    YOB: 1985  Age:         36 year old   Erich MRN:    [id number] Telephone Information:  Home Phone [phone]   Mobile [phone]       Address:  [address] [facility] [address] Email address:  [email or web address]      Emergency Contact(s)    Name Relationship Lgl Grd Work Phone Home Phone Mobile Phone   1. SHAY KHAN Father  [phone]   2. LETY KHAN* Mother    [phone]           Primary language:  English     needed? No   [employer]:  [phone] op. 1  Other communication barriers:    Preferred Method of Communication:  Mail  Current living arrangement:    Mobility Status/ Medical Equipment:      Health Maintenance  Health Maintenance Reviewed:    Health Maintenance Due   Topic Date Due     Pneumococcal Vaccine: Pediatrics (0 to 5 Years) and At-Risk Patients (6 to 64 Years) (1 of 2 - PPSV23) Never done     PREVENTIVE CARE VISIT  10/16/2019         My Access Plan  Medical Emergency 911   Primary Clinic Line [facility] - [phone]   24 Hour Appointment Line [phone] or  [phone] ([phone]) (toll-free)   24 Hour Nurse Line [phone] (toll-free)   Preferred Urgent Care     Preferred Hospital     Preferred Pharmacy [id number] [address] [facility] [address]     Behavioral Health Crisis Line The National Suicide Prevention Lifeline at 1-727.291.9663 or 911             My Care Team Members  Patient Care Team       Relationship Specialty Notifications Start End    Eliseo Benitez PA-C PCP - General Physician Assistant - Medical  9/8/15     Phone: [phone] Fax: [phone]         [address] [address] [facility] [address]    Eliseo Benitez PA-C Assigned PCP   4/11/21     Phone: [phone] Fax: [phone]          [address] [facility] [address]    Roc Bettencourt LSW Lead Care Coordinator Primary Care - CC  7/27/21     Phone: [phone]                 My Care Plans  Self Management and Treatment Plan  Goals and (Comments)  Goals        General     2. Mental Health Management (pt-stated)      Notes - Note edited  7/29/2021 10:21 AM by Roc Bettencourt LSW     Goal Statement: I will continue to take steps to further support my sobriety over the next 6 months.   Date Goal set: 7/27/2021  Barriers: Career field might conflict with ability to successfully complete group work/treatment most effectively.   Strengths: Strong support from spouse. Accepting of additional CD treatment/resources. Accepting of Care Coordination.   Date to Achieve By: 1/1/2022  Patient expressed understanding of goal: yes  Action steps to achieve this goal:  1. I will continue to follow-up with mental health provider for Trauma Therapy as recommended/scheduled.   2. I will continue to take medications as prescribed.   3. I will continue to work with Care Team to assist in improving my sleep hygiene.   4. I will review additional CD treatment options and consider establishing with supports I think would be beneficial to me.            Lifestyle     1. Patient achieves sleep pattern objective      Notes - Note created  7/27/2021  3:48 PM by Roc Bettencourt LSW     Goal Statement: I will continue to work toward establishing a positive routine and plan to improve my sleep hygiene over the next 6 moths.   Date Goal set: 7/27/2021  Barriers: Trauma from career path. Sleep study was inconclusive for resolution to current sleep concerns.  Strengths: Accepting of care coordination. Sees a provider for Trauma therapy. Takes medications for sleep.   Date to Achieve By: 1/1/2022  Patient expressed understanding of goal: yes  Action steps to achieve this goal:  1. I will follow up with scheduled appointments as recommended  2. I will take medications as  prescribed.   3. I will contact my care team with questions, concerns, support needs.   4. I will use the clinic as a resource and I understand I can contact my clinic with 24/7 after hours services available.   5. I will discuss, review, schedule and complete any recommended overdue health maintenance with my provider/care team.  6. I will continue to outreach to care coordination as needed for additional resources or supports.                   My Medical and Care Information  Problem List   Patient Active Problem List   Diagnosis     CARDIOVASCULAR SCREENING; LDL GOAL LESS THAN 160     GERD (gastroesophageal reflux disease)     Moderate major depression (H)     Generalized anxiety disorder     Health Care Home     Panic disorder without agoraphobia     Chronic insomnia     Hemiplegic migraine     Obesity     Morbid obesity (H)     Developmental dyslexia     Heartburn     History of ADHD     Seasonal allergic rhinitis     Severe major depression without psychotic features (H)     Shift work sleep disorder     PTSD (post-traumatic stress disorder)     Uncomplicated alcohol dependence (H)      Current Medications and Allergies:    Current Outpatient Medications   Medication     citalopram (CELEXA) 20 MG tablet     doxepin (SINEQUAN) 25 MG capsule     eszopiclone (LUNESTA) 3 MG tablet     gabapentin (NEURONTIN) 300 MG capsule     LORazepam (ATIVAN) 1 MG tablet     nicotine polacrilex (NICORETTE) 2 MG gum     prazosin (MINIPRESS) 500 MCG capsule     venlafaxine (EFFEXOR-XR) 37.5 MG 24 hr capsule     No current facility-administered medications for this visit.         Care Coordination Start Date: 7/27/2021   Frequency of Care Coordination: monthly   Form Last Updated: 07/29/2021

## 2021-07-27 NOTE — LETTER
[facility] CARE COORDINATION  [address]  [facility] [address]    July 29, 2021    Harrison Khan  [address] [facility] [address]      Dear Harrison,    I am a clinic care coordinator who works with Eliseo Benitez PA-C. I wanted to thank you for spending the time to talk with me.  Below is a description of clinic care coordination and how I can further assist you.      The clinic care coordination team is made up of a registered nurse,  and community health worker who understand the health care system. The goal of clinic care coordination is to help you manage your health and improve access to the health care system in the most efficient manner. The team can assist you in meeting your health care goals by providing education, coordinating services, strengthening the communication among your providers and supporting you with any resource needs.    Please feel free to contact me with any questions or concerns. We are focused on providing you with the highest-quality healthcare experience possible and that all starts with you.     Sincerely,     Roc Bettencourt [facility]  Clinic Care Coordinator  [facility]  [facility]  [phone]  [email or web address]          Enclosed: I have enclosed a copy of the Complex Care Plan. This has helpful information and goals that we have talked about. Please keep this in an easy to access place to use as needed.

## 2021-07-27 NOTE — PROGRESS NOTES
Kwaku is a 36 year old who is being evaluated via a billable video visit.      How would you like to obtain your AVS? MyChart  If the video visit is dropped, the invitation should be resent by: Text to cell phone: [phone]  Will anyone else be joining your video visit? No    Video Start Time: 10:44 AM    Assessment & Plan     Anxiety  He will wean off Celexa and start on Effexor.  Follow up virtual or in person in two weeks, sooner prn.  - venlafaxine (EFFEXOR-XR) 37.5 MG 24 hr capsule; Take 1 capsule (37.5 mg) by mouth daily  - citalopram (CELEXA) 20 MG tablet; Take 1 tablet (20 mg) by mouth daily    PTSD (post-traumatic stress disorder)  He is interested in trying a lower dose of this again based on the recommendations in the e-consult.  We discussed the other recommendations regarding treatment programs etc.  He has concerns about the group portions of these programs.  I placed a Care Coordination referral already so we will use the RN and  to help us explore these options.  Kwaku is quite willing to do whatever it takes to get healthy.  - prazosin (MINIPRESS) 500 MCG capsule; Take 1 capsule (0.5 mg) by mouth At Bedtime This product only available from a Compounding Pharmacy.    Panic disorder without agoraphobia  He is agreeable to stopping benzo's per recommendations.  He is nervous to do so quickly without a back up plan because in the past when he has had a panic attack he has had to go to the ED before and wants to avoid this.  We will change back to Ativan and then start weaning off benzo in general.  - venlafaxine (EFFEXOR-XR) 37.5 MG 24 hr capsule; Take 1 capsule (37.5 mg) by mouth daily  - LORazepam (ATIVAN) 1 MG tablet; Take 1 tablet (1 mg) by mouth every 6 hours as needed for anxiety    Review of external notes as documented elsewhere in note      Return in about 2 weeks (around 8/10/2021) for depression/anxiety med check, video call ok.    Eliseo Benitez PA-C  [facility]  "CLINIC DESIREE Ames is a 36 year old who presents for the following health issues    HPI     Patient would like to discuss information from the e- consult with the psychiatrist with you.    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  This has happened in the past to people he knows and has hindered therapy and treatment.  Not sure he is comfortable with this idea.    He does, however want to do what ever is recommended to get himself on the track to better mental health.    He has added gabapentin at night so far.  Increased to 30mg Celexa.  Psych e- consult had some med change recommendations.  He has tried prazosin before and \"fell out of bed\" likely due to orthostatic BP drop.    Would like to explore trying Effexor.      Review of Systems   Constitutional, HEENT, cardiovascular, pulmonary, gi and gu systems are negative, except as otherwise noted.      Objective    Vitals - Patient Reported  Pain Score: No Pain (0)      Vitals:  No vitals were obtained today due to virtual visit.    Physical Exam   GENERAL: Healthy, alert and no distress  EYES: Eyes grossly normal to inspection.  No discharge or erythema, or obvious scleral/conjunctival abnormalities.  RESP: No audible wheeze, cough, or visible cyanosis.  No visible retractions or increased work of breathing.    SKIN: Visible skin clear. No significant rash, abnormal pigmentation or lesions.  NEURO: Cranial nerves grossly intact.  Mentation and speech appropriate for age.  PSYCH: Mentation appears normal, affect normal/bright, judgement and insight intact, normal speech and appearance well-groomed.          Video-Visit Details    Type of service:  Video Visit    Video End Time:11:04 AM    Originating Location (pt. Location): Home    Distant " Location (provider location):  [facility]     Platform used for Video Visit: JacintaWell

## 2021-07-27 NOTE — PROGRESS NOTES
Clinic Care Coordination Contact    Clinic Care Coordination Contact  OUTREACH    Referral Information:  Referral Source: PCP         Chief Complaint   Patient presents with     Clinic Care Coordination - Initial     Resources for Support: CD Resources        Universal Utilization:    Utilization    Hospital Admissions  0             ED Visits  0             No Show Count (past year)  3                Current as of: 7/27/2021 11:34 AM              Clinical Concerns:  Current Medical Concerns:    Patient Active Problem List   Diagnosis     CARDIOVASCULAR SCREENING; LDL GOAL LESS THAN 160     GERD (gastroesophageal reflux disease)     Moderate major depression (H)     Generalized anxiety disorder     Health Care Home     Panic disorder without agoraphobia     Chronic insomnia     Hemiplegic migraine     Obesity     Morbid obesity (H)     Developmental dyslexia     Heartburn     History of ADHD     Seasonal allergic rhinitis     Severe major depression without psychotic features (H)     Shift work sleep disorder     PTSD (post-traumatic stress disorder)     Uncomplicated alcohol dependence (H)         [facility] outreached to pt to review referral and assess for needs.     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 Pt states this was filed 6/21/21 and should be processed soon.  Additionally, pt has retained a . Pt reports he has had difficulty sleeping for some time now adding that when he closes his eyes and attempts to sleep he is often interrupted and kept awake by images and past traumas from his time on the force which he finds hard to dismiss. Pt states as a result he has turned to alcohol to help him sleep. Pt states his usage consists of only in the evening and only for the purpose to help him sleep.     [facility] She assists with Trauma  "therapy treatments similar to that of EMDR.  Pt reports he has tried medicinal intervention for sleep aides, but states they have not been successful. Pt recently saw PCP to discuss adjusting these meds. Pt reports he was switched to Celexa and his pain medication was increased.     Pt denies any need for detox as he doesn't feel his usage is one that would require any medical intervention or concerns.   Pt did complete a sleep study, but states the only conclusion that came out of the study was that he does not have sleep apnea.     Pt reports having a strong support from his  and PCP for medication management. Pt and SWCC discussed additional supports. Pt states given his time on the force, he does not feel comfortable participating in any treatment or programming that has a group element to it for reasons by which pt states he is concerned he could potentially encounter individuals he has come across in his time on the force or encounter someone who has \"an issue with police.\" Pt states he has friends who have also participating in group settings for treatment and this has happened to them. SWCC and pt reviewed other supports. Pt welcomes options for individual therapy as well as alternatives to AA adding he does not feel support groups (and group setting) will be as much of a concern given his past.     [facility] sent pt resources for Individual CD therapy as well as alternatives to AA.     Current Behavioral Concerns: Current, consistent alcohol use to assist with sleeping.     Education Provided to patient: CD Treatment options.       Health Maintenance Reviewed:   Health Maintenance Due   Topic Date Due     Pneumococcal Vaccine: Pediatrics (0 to 5 Years) and At-Risk Patients (6 to 64 Years) (1 of 2 - PPSV23) Never done     PREVENTIVE CARE VISIT  10/16/2019        Clinical Pathway: None    Medication Management:  Medication review status: Medications reviewed and no changes reported per patient.       "     Lifestyle & Psychosocial Needs:  Social Determinants of Health     Tobacco Use: High Risk     Smoking Tobacco Use: Current Every Day Smoker     Smokeless Tobacco Use: Former User   Alcohol Use:      Frequency of Alcohol Consumption:      Average Number of Drinks:      Frequency of Binge Drinking:    Financial Resource Strain:      Difficulty of Paying Living Expenses:    Food Insecurity:      Worried About Running Out of Food in the Last Year:      Ran Out of Food in the Last Year:    Transportation Needs:      Lack of Transportation (Medical):      Lack of Transportation (Non-Medical):    Physical Activity:      Days of Exercise per Week:      Minutes of Exercise per Session:    Stress:      Feeling of Stress :    Social Connections:      Frequency of Communication with Friends and Family:      Frequency of Social Gatherings with Friends and Family:      Attends Taoism Services:      Active Member of Clubs or Organizations:      Attends Club or Organization Meetings:      Marital Status:    Intimate Partner Violence:      Fear of Current or Ex-Partner:      Emotionally Abused:      Physically Abused:      Sexually Abused:    Depression: At risk     PHQ-2 Score: 5   Housing Stability:      Unable to Pay for Housing in the Last Year:      Number of Places Lived in the Last Year:      Unstable Housing in the Last Year:      Significant changes in sleep pattern (GOAL): Yes        Mental health DX:: Yes  Mental health DX how managed:: Medication  Mental health management concern (GOAL):: Yes  Chemical Dependency Status: Current Concern  Informal Support system:: Spouse     Care Coordinator has reviewed patient's Social Determinants of Health (SDoH) on this date. Upon review, changes were not  made.        Goals:   Goals        General     2. Mental Health Management (pt-stated)      Notes - Note edited  7/29/2021 10:21 AM by Roc Bettencourt LSW     Goal Statement: I will continue to take steps to further support  my sobriety over the next 6 months.   Date Goal set: 7/27/2021  Barriers: Career field might conflict with ability to successfully complete group work/treatment most effectively.   Strengths: Strong support from spouse. Accepting of additional CD treatment/resources. Accepting of Care Coordination.   Date to Achieve By: 1/1/2022  Patient expressed understanding of goal: yes  Action steps to achieve this goal:  1. I will continue to follow-up with mental health provider for Trauma Therapy as recommended/scheduled.   2. I will continue to take medications as prescribed.   3. I will continue to work with Care Team to assist in improving my sleep hygiene.   4. I will review additional CD treatment options and consider establishing with supports I think would be beneficial to me.            Lifestyle     1. Patient achieves sleep pattern objective      Notes - Note created  7/27/2021  3:48 PM by Roc Bettencourt LSW     Goal Statement: I will continue to work toward establishing a positive routine and plan to improve my sleep hygiene over the next 6 moths.   Date Goal set: 7/27/2021  Barriers: Trauma from career path. Sleep study was inconclusive for resolution to current sleep concerns.  Strengths: Accepting of care coordination. Sees a provider for Trauma therapy. Takes medications for sleep.   Date to Achieve By: 1/1/2022  Patient expressed understanding of goal: yes  Action steps to achieve this goal:  1. I will follow up with scheduled appointments as recommended  2. I will take medications as prescribed.   3. I will contact my care team with questions, concerns, support needs.   4. I will use the clinic as a resource and I understand I can contact my clinic with 24/7 after hours services available.   5. I will discuss, review, schedule and complete any recommended overdue health maintenance with my provider/care team.  6. I will continue to outreach to care coordination as needed for additional resources or supports.              Patient/Caregiver understanding: Pt reports understanding and denies any additional questions or concerns at this times. SW CC engaged in AIDET communication during encounter.      Outreach Frequency: monthly  Future Appointments              In 2 weeks Eliseo Benitez PA-C [facility]          Plan: Pt to review resources provided and consider establishing with CD supports. SWCC to f/u again in 3-4 weeks to check on status.     NORIS Rosales  Clinic Care Coordinator  [facility]  [facility]  [facility]  [phone]  [email or web address]

## 2021-07-30 ENCOUNTER — MYC MEDICAL ADVICE (OUTPATIENT)
Dept: FAMILY MEDICINE | Facility: CLINIC | Age: 36
End: 2021-07-30

## 2021-08-12 ENCOUNTER — OFFICE VISIT (OUTPATIENT)
Dept: FAMILY MEDICINE | Facility: CLINIC | Age: 36
End: 2021-08-12
Payer: COMMERCIAL

## 2021-08-12 VITALS
RESPIRATION RATE: 20 BRPM | TEMPERATURE: 98.3 F | OXYGEN SATURATION: 98 % | HEART RATE: 96 BPM | SYSTOLIC BLOOD PRESSURE: 148 MMHG | DIASTOLIC BLOOD PRESSURE: 72 MMHG

## 2021-08-12 DIAGNOSIS — F43.10 PTSD (POST-TRAUMATIC STRESS DISORDER): ICD-10-CM

## 2021-08-12 DIAGNOSIS — F51.04 CHRONIC INSOMNIA: ICD-10-CM

## 2021-08-12 DIAGNOSIS — F41.0 PANIC DISORDER WITHOUT AGORAPHOBIA: ICD-10-CM

## 2021-08-12 DIAGNOSIS — F41.9 ANXIETY: Primary | ICD-10-CM

## 2021-08-12 PROCEDURE — 99214 OFFICE O/P EST MOD 30 MIN: CPT | Performed by: PHYSICIAN ASSISTANT

## 2021-08-12 RX ORDER — GABAPENTIN 300 MG/1
CAPSULE ORAL
Qty: 300 CAPSULE | Refills: 1 | Status: SHIPPED | OUTPATIENT
Start: 2021-08-12

## 2021-08-12 RX ORDER — VENLAFAXINE HYDROCHLORIDE 75 MG/1
75 CAPSULE, EXTENDED RELEASE ORAL DAILY
Qty: 90 CAPSULE | Refills: 0 | Status: SHIPPED | OUTPATIENT
Start: 2021-08-12 | End: 2021-09-27

## 2021-08-12 RX ORDER — PRAZOSIN HYDROCHLORIDE 1 MG/1
1 CAPSULE ORAL AT BEDTIME
Qty: 90 CAPSULE | Refills: 0 | Status: SHIPPED | OUTPATIENT
Start: 2021-08-12 | End: 2021-09-08

## 2021-08-12 NOTE — PROGRESS NOTES
Assessment & Plan     Anxiety  Ok to increase gabapentin at night.  He finds starting in the AM too early makes him tired.  Increase Effexor to 75mg and plan to increase to 150mg when he is ready.  - gabapentin (NEURONTIN) 300 MG capsule; Take 300mg at noon, 300mg at 7pm and then 900mg (three caps) at bedtime  - venlafaxine (EFFEXOR-XR) 75 MG 24 hr capsule; Take 1 capsule (75 mg) by mouth daily    Chronic insomnia    - gabapentin (NEURONTIN) 300 MG capsule; Take 300mg at noon, 300mg at 7pm and then 900mg (three caps) at bedtime    Panic disorder without agoraphobia  Increase dose today- if he has side effects he will let me know.  - prazosin (MINIPRESS) 1 MG capsule; Take 1 capsule (1 mg) by mouth At Bedtime    PTSD (post-traumatic stress disorder)    - prazosin (MINIPRESS) 1 MG capsule; Take 1 capsule (1 mg) by mouth At Bedtime        Return in about 1 month (around 9/12/2021) for depression/anxiety med check.    Eliseo Benitez PA-C  [facility] DESIREE Ames is a 36 year old who presents for the following health issues     HPI     Depression and Anxiety Follow-Up    How are you doing with your depression since your last visit? Improved      How are you doing with your anxiety since your last visit?  Same- but thinks a little better    Are you having other symptoms that might be associated with depression or anxiety? No    Have you had a significant life event? Job Concerns     Do you have any concerns with your use of alcohol or other drugs? Yes:       Social History     Tobacco Use     Smoking status: Current Every Day Smoker     Packs/day: 0.50     Years: 0.00     Pack years: 0.00     Types: Cigarettes     Smokeless tobacco: Former User     Types: Chew     Quit date: 4/17/2018   Vaping Use     Vaping Use: Never used   Substance Use Topics     Alcohol use: Yes     Alcohol/week: 0.0 standard drinks     Comment: daily-5-6 mix drinks per day.      Drug use: No     PHQ  4/14/2021 5/17/2021 7/21/2021   PHQ-9 Total Score 16 13 17   Q9: Thoughts of better off dead/self-harm past 2 weeks Several days Several days More than half the days     ELY-7 SCORE 4/14/2021 5/17/2021 7/21/2021   Total Score - - -   Total Score - - -   Total Score 16 15 17     Kwaku is here for follow up today.  After recent psych e-consult we made some changes to medications.  He is now off Celexa and taking 37.5mg Effexor- feels his mood has improved a small amount.  Also doing well on prazosin (1/2 dose)- last time he didn't tolerate it well.  He does think he could go up in dose because he is not having side effects and also helping with sleep/nightmares.    Wondering about gabapentin dosing.  Can he take more at night.  Sleep has always been a problem for him.  Because he is sleeping better he is drinking less.  Still does not think CD treatment is a viable option for him but willing to re-consider.  His therapist may be able to refer him somewhere.    Gabapentin- 12, 7, bed (two)  Drinking less- sleeping better  Therapy weekly, possibly another person, referral for CD      Review of Systems   Constitutional, HEENT, cardiovascular, pulmonary, gi and gu systems are negative, except as otherwise noted.      Objective    BP (!) 148/72 (BP Location: Right arm, Patient Position: Sitting, Cuff Size: Adult Large)   Pulse 96   Temp 98.3  F (36.8  C) (Oral)   Resp 20   SpO2 98%   There is no height or weight on file to calculate BMI.  Physical Exam   GENERAL: healthy, alert and no distress  MS: no gross musculoskeletal defects noted, no edema  SKIN: no suspicious lesions or rashes  PSYCH: mentation appears normal, affect normal/bright

## 2021-08-23 ENCOUNTER — MYC MEDICAL ADVICE (OUTPATIENT)
Dept: FAMILY MEDICINE | Facility: CLINIC | Age: 36
End: 2021-08-23

## 2021-08-23 ENCOUNTER — MYC REFILL (OUTPATIENT)
Dept: FAMILY MEDICINE | Facility: CLINIC | Age: 36
End: 2021-08-23

## 2021-08-23 DIAGNOSIS — F41.0 PANIC DISORDER WITHOUT AGORAPHOBIA: ICD-10-CM

## 2021-08-24 RX ORDER — LORAZEPAM 1 MG/1
1 TABLET ORAL EVERY 6 HOURS PRN
Qty: 20 TABLET | Refills: 0 | Status: SHIPPED | OUTPATIENT
Start: 2021-08-24 | End: 2021-09-09

## 2021-08-24 NOTE — TELEPHONE ENCOUNTER
Is there a message from him that I am supposed to see?  It says see [facility] message.  I only see the refill request.  I will fill but we have discussed limiting use of this which he knows.  Please see if everything is ok.      Thanks

## 2021-08-24 NOTE — TELEPHONE ENCOUNTER
Lorazepam 1 mg       Last Written Prescription Date:  7/27/2021  Last Fill Quantity: 20,   # refills: 0  Last Office Visit: 8/12/2021  Future Office visit:    Next 5 appointments (look out 90 days)    Sep 27, 2021  2:15 PM  Glynn Ace with Eliseo Benitez PA-C  [facility] ([facility] ) [address] [facility] [address] [phone]           Routing refill request to provider for review/approval because:  Drug not on the [facility], [facility] or [facility] refill protocol or controlled substance    See  from 8/23/2021    Kelli ONEILL RN

## 2021-08-24 NOTE — TELEPHONE ENCOUNTER
Yes, if you open the encounter, under chart review there is a [facility] message from yesterday.     Kelli ONEILL RN

## 2021-08-27 ENCOUNTER — PATIENT OUTREACH (OUTPATIENT)
Dept: CARE COORDINATION | Facility: CLINIC | Age: 36
End: 2021-08-27

## 2021-08-29 ENCOUNTER — MYC MEDICAL ADVICE (OUTPATIENT)
Dept: FAMILY MEDICINE | Facility: CLINIC | Age: 36
End: 2021-08-29

## 2021-09-08 ENCOUNTER — VIRTUAL VISIT (OUTPATIENT)
Dept: FAMILY MEDICINE | Facility: CLINIC | Age: 36
End: 2021-09-08
Payer: COMMERCIAL

## 2021-09-08 DIAGNOSIS — F43.10 PTSD (POST-TRAUMATIC STRESS DISORDER): ICD-10-CM

## 2021-09-08 DIAGNOSIS — F51.04 CHRONIC INSOMNIA: Primary | ICD-10-CM

## 2021-09-08 DIAGNOSIS — F41.0 PANIC DISORDER WITHOUT AGORAPHOBIA: ICD-10-CM

## 2021-09-08 PROCEDURE — 99214 OFFICE O/P EST MOD 30 MIN: CPT | Mod: 95 | Performed by: PHYSICIAN ASSISTANT

## 2021-09-08 RX ORDER — ZALEPLON 10 MG/1
CAPSULE ORAL
Qty: 45 CAPSULE | Refills: 2 | Status: SHIPPED | OUTPATIENT
Start: 2021-09-08 | End: 2021-09-27

## 2021-09-08 RX ORDER — PRAZOSIN HYDROCHLORIDE 2 MG/1
2 CAPSULE ORAL AT BEDTIME
Qty: 90 CAPSULE | Refills: 0 | Status: SHIPPED | OUTPATIENT
Start: 2021-09-08 | End: 2021-10-27

## 2021-09-08 RX ORDER — QUETIAPINE FUMARATE 25 MG/1
25-50 TABLET, FILM COATED ORAL AT BEDTIME
Qty: 180 TABLET | Refills: 0 | Status: SHIPPED | OUTPATIENT
Start: 2021-09-08 | End: 2021-11-12

## 2021-09-08 ASSESSMENT — ANXIETY QUESTIONNAIRES
1. FEELING NERVOUS, ANXIOUS, OR ON EDGE: NEARLY EVERY DAY
5. BEING SO RESTLESS THAT IT IS HARD TO SIT STILL: SEVERAL DAYS
7. FEELING AFRAID AS IF SOMETHING AWFUL MIGHT HAPPEN: SEVERAL DAYS
3. WORRYING TOO MUCH ABOUT DIFFERENT THINGS: NEARLY EVERY DAY
IF YOU CHECKED OFF ANY PROBLEMS ON THIS QUESTIONNAIRE, HOW DIFFICULT HAVE THESE PROBLEMS MADE IT FOR YOU TO DO YOUR WORK, TAKE CARE OF THINGS AT HOME, OR GET ALONG WITH OTHER PEOPLE: VERY DIFFICULT
GAD7 TOTAL SCORE: 15
2. NOT BEING ABLE TO STOP OR CONTROL WORRYING: NEARLY EVERY DAY
6. BECOMING EASILY ANNOYED OR IRRITABLE: SEVERAL DAYS

## 2021-09-08 ASSESSMENT — PATIENT HEALTH QUESTIONNAIRE - PHQ9
SUM OF ALL RESPONSES TO PHQ QUESTIONS 1-9: 16
5. POOR APPETITE OR OVEREATING: NEARLY EVERY DAY

## 2021-09-08 NOTE — PROGRESS NOTES
Kwaku is a 36 year old who is being evaluated via a billable video visit.      How would you like to obtain your AVS? MyChart  If the video visit is dropped, the invitation should be resent by: Text to cell phone: [phone]  Will anyone else be joining your video visit? No      Video Start Time: 2:41 PM    Assessment & Plan     Chronic insomnia  Back to seroquel and sonata.  Both he has used before but not together.  He has an appointment again already with me in three weeks.  - QUEtiapine (SEROQUEL) 25 MG tablet; Take 1-2 tablets (25-50 mg) by mouth At Bedtime  - zaleplon (SONATA) 10 MG capsule; Take 10mg at bedtime; can repeat once at night if still 4 more hours of sleep left to go    PTSD (post-traumatic stress disorder)  Willing to increase this.  He is tolerating it better than last time he was on it.  - prazosin (MINIPRESS) 2 MG capsule; Take 1 capsule (2 mg) by mouth At Bedtime    Panic disorder without agoraphobia    - prazosin (MINIPRESS) 2 MG capsule; Take 1 capsule (2 mg) by mouth At Bedtime       Depression Screening Follow Up    PHQ 9/8/2021   PHQ-9 Total Score 16   Q9: Thoughts of better off dead/self-harm past 2 weeks More than half the days       Follow Up      Follow Up Actions Taken  3 weeks follow up, seeing therapy regularly    Discussed the following ways the patient can remain in a safe environment:  secure medications:  , remove access to firearms:  , remove things I could use to hurt myself:   and be around others      Return in about 3 weeks (around 9/29/2021) for depression/anxiety med check.    Eliseo Benitez PA-C  [facility]    Subjective   Kwaku is a 36 year old who presents for the following health issues     HPI     Depression and Anxiety Follow-Up    How are you doing with your depression since your last visit? Worsened     How are you doing with your anxiety since your last visit?  Worsened     Are you having other symptoms that might be associated with  depression or anxiety? Yes:  sleeping and panic attacks and episode of sucidal thoughts    Have you had a significant life event? No     Do you have any concerns with your use of alcohol or other drugs? No    Social History     Tobacco Use     Smoking status: Current Every Day Smoker     Packs/day: 0.50     Years: 0.00     Pack years: 0.00     Types: Cigarettes     Smokeless tobacco: Former User     Types: Chew     Quit date: 4/17/2018   Vaping Use     Vaping Use: Never used   Substance Use Topics     Alcohol use: Yes     Alcohol/week: 0.0 standard drinks     Comment: daily-5-6 mix drinks per day.      Drug use: No     PHQ 5/17/2021 7/21/2021 9/8/2021   PHQ-9 Total Score 13 17 16   Q9: Thoughts of better off dead/self-harm past 2 weeks Several days More than half the days More than half the days     ELY-7 SCORE 5/17/2021 7/21/2021 9/8/2021   Total Score - - -   Total Score - - -   Total Score 15 17 15     Last PHQ-9 9/8/2021   1.  Little interest or pleasure in doing things 2   2.  Feeling down, depressed, or hopeless 3   3.  Trouble falling or staying asleep, or sleeping too much 3   4.  Feeling tired or having little energy 1   5.  Poor appetite or overeating 1   6.  Feeling bad about yourself 2   7.  Trouble concentrating 1   8.  Moving slowly or restless 1   Q9: Thoughts of better off dead/self-harm past 2 weeks 2   PHQ-9 Total Score 16   Difficulty at work, home, or with people Very difficult     ELY-7  9/8/2021   1. Feeling nervous, anxious, or on edge 3   2. Not being able to stop or control worrying 3   3. Worrying too much about different things 3   4. Trouble relaxing 3   5. Being so restless that it is hard to sit still 1   6. Becoming easily annoyed or irritable 1   7. Feeling afraid, as if something awful might happen 1   ELY-7 Total Score 15   If you checked any problems, how difficult have they made it for you to do your work, take care of things at home, or get along with other people? Very difficult  "      How many servings of fruits and vegetables do you eat daily?  2-3    On average, how many sweetened beverages do you drink each day (Examples: soda, juice, sweet tea, etc.  Do NOT count diet or artificially sweetened beverages)?   0    How many days per week do you exercise enough to make your heart beat faster? 7    How many minutes a day do you exercise enough to make your heart beat faster? 60 or more    How many days per week do you miss taking your medication? 0      Feels like he is missing a window of panic when he takes his meds at night and when he starts having visions/symptoms.  Then cannot fall asleep.  Wondering about changing some things again.  Still seeing therapy.  Still having some passing suicidal thoughts.  Got all firearms completely out of the house now.  Wonders a little if the Effexor is causing these thoughts so does not want to increase this yet.    Review of Systems   Constitutional, HEENT, cardiovascular, pulmonary, gi and gu systems are negative, except as otherwise noted.      Objective    Vitals - Patient Reported  Weight (Patient Reported): 133.8 kg (295 lb)  Height (Patient Reported): 177.8 cm (5' 10\")  BMI (Based on Pt Reported Ht/Wt): 42.33  Pulse (Patient Reported): 87  Pain Score: No Pain (0)      Vitals:  No vitals were obtained today due to virtual visit.    Physical Exam   GENERAL: Healthy, alert and no distress  EYES: Eyes grossly normal to inspection.  No discharge or erythema, or obvious scleral/conjunctival abnormalities.  RESP: No audible wheeze, cough, or visible cyanosis.  No visible retractions or increased work of breathing.    SKIN: Visible skin clear. No significant rash, abnormal pigmentation or lesions.  NEURO: Cranial nerves grossly intact.  Mentation and speech appropriate for age.  PSYCH: Mentation appears normal, affect normal/bright, judgement and insight intact, normal speech and appearance well-groomed.                Video-Visit Details    Type of " service:  Video Visit    Video End Time:3:06 PM    Originating Location (pt. Location): Home    Distant Location (provider location):  [facility]     Platform used for Video Visit: Khari

## 2021-09-09 ENCOUNTER — MYC MEDICAL ADVICE (OUTPATIENT)
Dept: FAMILY MEDICINE | Facility: CLINIC | Age: 36
End: 2021-09-09

## 2021-09-09 DIAGNOSIS — F32.1 MODERATE MAJOR DEPRESSION (H): ICD-10-CM

## 2021-09-09 DIAGNOSIS — F43.10 PTSD (POST-TRAUMATIC STRESS DISORDER): ICD-10-CM

## 2021-09-09 DIAGNOSIS — F41.0 PANIC DISORDER WITHOUT AGORAPHOBIA: Primary | ICD-10-CM

## 2021-09-09 RX ORDER — LORAZEPAM 1 MG/1
1 TABLET ORAL EVERY 6 HOURS PRN
Qty: 30 TABLET | Refills: 0 | Status: SHIPPED | OUTPATIENT
Start: 2021-09-09 | End: 2021-09-28

## 2021-09-09 ASSESSMENT — ANXIETY QUESTIONNAIRES: GAD7 TOTAL SCORE: 15

## 2021-09-14 ENCOUNTER — MYC MEDICAL ADVICE (OUTPATIENT)
Dept: FAMILY MEDICINE | Facility: CLINIC | Age: 36
End: 2021-09-14

## 2021-09-27 ENCOUNTER — OFFICE VISIT (OUTPATIENT)
Dept: FAMILY MEDICINE | Facility: CLINIC | Age: 36
End: 2021-09-27
Payer: COMMERCIAL

## 2021-09-27 VITALS
OXYGEN SATURATION: 97 % | SYSTOLIC BLOOD PRESSURE: 134 MMHG | RESPIRATION RATE: 18 BRPM | DIASTOLIC BLOOD PRESSURE: 70 MMHG | TEMPERATURE: 98.2 F | WEIGHT: 293 LBS | BODY MASS INDEX: 42.04 KG/M2 | HEART RATE: 106 BPM

## 2021-09-27 DIAGNOSIS — F41.0 PANIC DISORDER WITHOUT AGORAPHOBIA: ICD-10-CM

## 2021-09-27 DIAGNOSIS — F43.10 PTSD (POST-TRAUMATIC STRESS DISORDER): Primary | ICD-10-CM

## 2021-09-27 DIAGNOSIS — F51.04 CHRONIC INSOMNIA: ICD-10-CM

## 2021-09-27 DIAGNOSIS — F10.20 UNCOMPLICATED ALCOHOL DEPENDENCE (H): ICD-10-CM

## 2021-09-27 DIAGNOSIS — Z23 ENCOUNTER FOR IMMUNIZATION: ICD-10-CM

## 2021-09-27 DIAGNOSIS — F32.1 MODERATE MAJOR DEPRESSION (H): ICD-10-CM

## 2021-09-27 PROCEDURE — 90686 IIV4 VACC NO PRSV 0.5 ML IM: CPT | Performed by: PHYSICIAN ASSISTANT

## 2021-09-27 PROCEDURE — 99214 OFFICE O/P EST MOD 30 MIN: CPT | Mod: 25 | Performed by: PHYSICIAN ASSISTANT

## 2021-09-27 PROCEDURE — 90471 IMMUNIZATION ADMIN: CPT | Performed by: PHYSICIAN ASSISTANT

## 2021-09-27 RX ORDER — VENLAFAXINE HYDROCHLORIDE 37.5 MG/1
112.5 CAPSULE, EXTENDED RELEASE ORAL DAILY
Qty: 90 CAPSULE | Refills: 1 | Status: SHIPPED | OUTPATIENT
Start: 2021-09-27 | End: 2021-10-27

## 2021-09-27 RX ORDER — ZALEPLON 10 MG/1
CAPSULE ORAL
Qty: 60 CAPSULE | Refills: 2
Start: 2021-09-27 | End: 2021-10-04

## 2021-09-27 NOTE — PROGRESS NOTES
Assessment & Plan     PTSD (post-traumatic stress disorder)  He will increase dose of prazosin to 3mg.  He has both 2mg and 1mg capsules at home.  He has been tolerating this well so far and is comfortable doing this.  Follow up if having trouble tolerating.    Moderate major depression (H)  He is agreeable to increasing this today as well.  He is no longer having suicidal thoughts which we were thinking could have been from this dose increase a bit ago.  If this increase brings back any SI he will let me know immediately.  - venlafaxine (EFFEXOR-XR) 37.5 MG 24 hr capsule; Take 3 capsules (112.5 mg) by mouth daily    Uncomplicated alcohol dependence (H)  Continue with therapy.    Encounter for immunization    - HC FLU VAC PRESRV FREE QUAD SPLIT VIR > 6 MONTHS IM ([id number])    Chronic insomnia  He did not need a refill today.  He is now taking 20mg Sonata nightly. This appears to be at the top of the recommended dosing.  I discussed again with him returning to sleep medicine for evaluation.  This is a terribly chronic issue.  I am hoping Psych will be able to help with this.    Panic disorder without agoraphobia  As above- will be having first appointment with Psych in about 5-6 weeks.  - venlafaxine (EFFEXOR-XR) 37.5 MG 24 hr capsule; Take 3 capsules (112.5 mg) by mouth daily      Return in about 1 month (around 10/27/2021) for depression/anxiety med check.    Eliseo Benitez PA-C  [facility]    Subjective   Kwaku is a 36 year old who presents for the following health issues:    HPI     Medication Followup of Sonata, Seroquel    Taking Medication as prescribed: yes    Side Effects:  None    Medication Helping Symptoms:  Somewhat      Kwaku has started seeing a Psychologist for alcohol addiction- [facility] in [place].  He has had two appts so far and another this week.  Raj Li.  Still also seeing therapy once weekly.  Has his Psych appt in Nov.    Sleep- is now taking TWO  Sonata right away for sleep rather than one at onset of sleep and one later PRN.  Will eventually need another refill but not quite yet as this means he is using more.  Still not sleeping perfectly.      Review of Systems   Constitutional, HEENT, cardiovascular, pulmonary, gi and gu systems are negative, except as otherwise noted.      Objective    /70 (BP Location: Right arm, Patient Position: Sitting, Cuff Size: Adult Large)   Pulse 106   Temp 98.2  F (36.8  C) (Oral)   Resp 18   Wt 132.9 kg (293 lb)   SpO2 97%   BMI 42.04 kg/m    Body mass index is 42.04 kg/m .  Physical Exam   GENERAL: healthy, alert and no distress  PSYCH: mentation appears normal, affect normal but seems fatigued

## 2021-09-28 ENCOUNTER — MYC REFILL (OUTPATIENT)
Dept: FAMILY MEDICINE | Facility: CLINIC | Age: 36
End: 2021-09-28

## 2021-09-28 ENCOUNTER — TELEPHONE (OUTPATIENT)
Dept: FAMILY MEDICINE | Facility: CLINIC | Age: 36
End: 2021-09-28

## 2021-09-28 DIAGNOSIS — F41.0 PANIC DISORDER WITHOUT AGORAPHOBIA: ICD-10-CM

## 2021-09-29 NOTE — TELEPHONE ENCOUNTER
LORazepam (ATIVAN) 1 MG tablet         Last Written Prescription Date:  9/9/2021  Last Fill Quantity: 30,   # refills: 0  Last Office Visit: 9/27/2021  Future Office visit:       Routing refill request to provider for review/approval because:  Drug not on the [facility], [facility] or [facility] refill protocol or controlled substance    Concha Vital RN

## 2021-09-30 DIAGNOSIS — F51.04 CHRONIC INSOMNIA: ICD-10-CM

## 2021-10-01 NOTE — TELEPHONE ENCOUNTER
zaleplon (SONATA) 10 MG capsule  Last Written Prescription Date:    Last Fill Quantity: ,   # refills:   Last Office Visit: 9/27/21  Future Office visit:    Next 5 appointments (look out 90 days)    Oct 28, 2021  2:15 PM  Glynn Ace with Eliseo Benitez PA-C  [facility] ([facility] ) [address] [address] [facility] [address] [phone]           Routing refill request to provider for review/approval because:  Drug not on the [facility], [facility] or [facility] refill protocol or controlled substance    Vinita Pollard RN on 10/1/2021 at 2:09 PM

## 2021-10-04 ENCOUNTER — TELEPHONE (OUTPATIENT)
Dept: FAMILY MEDICINE | Facility: CLINIC | Age: 36
End: 2021-10-04

## 2021-10-04 RX ORDER — ZALEPLON 10 MG/1
CAPSULE ORAL
Qty: 60 CAPSULE | Refills: 0 | Status: SHIPPED | OUTPATIENT
Start: 2021-10-04 | End: 2021-10-27

## 2021-10-04 NOTE — TELEPHONE ENCOUNTER
Rec'd Functional Capacity Assessment from [facility]. Placed in PCP basket for review and signature. Fax: [phone]

## 2021-10-05 RX ORDER — LORAZEPAM 1 MG/1
1 TABLET ORAL EVERY 6 HOURS PRN
Qty: 30 TABLET | Refills: 0 | Status: SHIPPED | OUTPATIENT
Start: 2021-10-05 | End: 2021-10-27

## 2021-10-13 ENCOUNTER — OFFICE VISIT (OUTPATIENT)
Dept: URGENT CARE | Facility: URGENT CARE | Age: 36
End: 2021-10-13
Payer: COMMERCIAL

## 2021-10-13 VITALS
WEIGHT: 293.7 LBS | OXYGEN SATURATION: 96 % | SYSTOLIC BLOOD PRESSURE: 130 MMHG | DIASTOLIC BLOOD PRESSURE: 70 MMHG | RESPIRATION RATE: 18 BRPM | HEART RATE: 89 BPM | BODY MASS INDEX: 42.14 KG/M2 | TEMPERATURE: 98.4 F

## 2021-10-13 DIAGNOSIS — L02.31 ABSCESS OF RIGHT BUTTOCK: Primary | ICD-10-CM

## 2021-10-13 PROCEDURE — 99214 OFFICE O/P EST MOD 30 MIN: CPT | Performed by: PHYSICIAN ASSISTANT

## 2021-10-13 RX ORDER — DOXYCYCLINE 100 MG/1
100 CAPSULE ORAL 2 TIMES DAILY
Qty: 20 CAPSULE | Refills: 0 | Status: SHIPPED | OUTPATIENT
Start: 2021-10-13 | End: 2021-10-14

## 2021-10-13 NOTE — PROGRESS NOTES
Assessment & Plan     Abscess of right buttock  Symptoms and exam suggest.  Affected area is indurated.  Not amenable to I&D today.  He is allergic to pcn and sulfa. Doxycycline is prescribed today.  Tylenol#3 Rx for moderate to severe pain.  We discussed red flag symptoms and when to seek emergent care (fever, increasing pain or redness etc.).  Recommended warm packs to the affected area.  Keep monitoring symptoms.  Follow-up if any worsening symptoms.  Patient agrees with the plan.  - Doxycycline 100 mg tablet Dispense 20 tablet, Refill: 0  - acetaminophen-codeine (TYLENOL #3) 300-30 MG tablet  Dispense: 12 tablet; Refill: 0     30 minutes spent on the date of the encounter doing chart review, history and exam, documentation, patient education and further activities per the note        Return in about 4 days (around 10/17/2021) for Symptoms failing to improve.    Anabel Wilson PA-C  [facility] Ames is a 36 year old male who presents to clinic today for the following health issues:  Chief Complaint   Patient presents with     Sore     swollen on bottom right     HPI    He is presenting to urgent care today with a complaint of swollen area right buttock.  Onset of symptoms 5 days ago.  Symptoms worsening.  He denies any trauma or injury to the affected area.  No fevers or chills.  Treatment tried: Ibuprofen and Tylenol--not helping much.  He notes he has had a sore bottom from riding his peloton bike in the past before but symptoms typically will resolve in a couple days. This time symptoms are persistent.  No history of MRSA.  He is not diabetic.      Review of Systems  Constitutional, HEENT, cardiovascular, pulmonary, GI, , musculoskeletal, neuro, skin, endocrine and psych systems are negative, except as otherwise noted.      Objective    /70 (BP Location: Right arm, Patient Position: Chair, Cuff Size: Adult Large)   Pulse 89   Temp 98.4  F (36.9  C)  (Oral)   Resp 18   Wt 133.2 kg (293 lb 11.2 oz)   SpO2 96%   BMI 42.14 kg/m    Physical Exam   GENERAL: healthy, alert and no distress  RECTAL: indurated area about 3 cm in diameter slightly erythematous noted right buttock adjacent to the gluteal cleft. No fluctuance. Tenderness to palpation.

## 2021-10-14 ENCOUNTER — NURSE TRIAGE (OUTPATIENT)
Dept: NURSING | Facility: CLINIC | Age: 36
End: 2021-10-14

## 2021-10-14 ENCOUNTER — HOSPITAL ENCOUNTER (EMERGENCY)
Facility: CLINIC | Age: 36
Discharge: HOME | End: 2021-10-14
Attending: EMERGENCY MEDICINE | Admitting: EMERGENCY MEDICINE
Payer: COMMERCIAL

## 2021-10-14 ENCOUNTER — APPOINTMENT (OUTPATIENT)
Dept: CT IMAGING | Facility: CLINIC | Age: 36
End: 2021-10-14
Attending: EMERGENCY MEDICINE
Payer: COMMERCIAL

## 2021-10-14 VITALS
OXYGEN SATURATION: 95 % | HEART RATE: 86 BPM | RESPIRATION RATE: 18 BRPM | DIASTOLIC BLOOD PRESSURE: 78 MMHG | TEMPERATURE: 98.5 F | SYSTOLIC BLOOD PRESSURE: 139 MMHG

## 2021-10-14 DIAGNOSIS — A41.9 SEPSIS, DUE TO UNSPECIFIED ORGANISM, UNSPECIFIED WHETHER ACUTE ORGAN DYSFUNCTION PRESENT (H): ICD-10-CM

## 2021-10-14 DIAGNOSIS — K61.0 PERIANAL ABSCESS: Primary | ICD-10-CM

## 2021-10-14 DIAGNOSIS — L03.317 CELLULITIS OF RIGHT BUTTOCK: ICD-10-CM

## 2021-10-14 DIAGNOSIS — L02.31 ABSCESS OF RIGHT BUTTOCK: ICD-10-CM

## 2021-10-14 LAB
ALBUMIN SERPL-MCNC: 3.4 G/DL (ref 3.4–5)
ALP SERPL-CCNC: 102 U/L (ref 40–150)
ALT SERPL W P-5'-P-CCNC: 92 U/L (ref 0–70)
ANION GAP SERPL CALCULATED.3IONS-SCNC: 7 MMOL/L (ref 3–14)
AST SERPL W P-5'-P-CCNC: 43 U/L (ref 0–45)
BASE EXCESS BLDV CALC-SCNC: 1.2 MMOL/L (ref -7.7–1.9)
BASOPHILS # BLD AUTO: 0 10E3/UL (ref 0–0.2)
BASOPHILS NFR BLD AUTO: 0 %
BILIRUB SERPL-MCNC: 1 MG/DL (ref 0.2–1.3)
BUN SERPL-MCNC: 12 MG/DL (ref 7–30)
CALCIUM SERPL-MCNC: 9.1 MG/DL (ref 8.5–10.1)
CHLORIDE BLD-SCNC: 105 MMOL/L (ref 94–109)
CO2 SERPL-SCNC: 25 MMOL/L (ref 20–32)
CREAT SERPL-MCNC: 1.14 MG/DL (ref 0.66–1.25)
EOSINOPHIL # BLD AUTO: 0.1 10E3/UL (ref 0–0.7)
EOSINOPHIL NFR BLD AUTO: 1 %
ERYTHROCYTE [DISTWIDTH] IN BLOOD BY AUTOMATED COUNT: 11.5 % (ref 10–15)
GFR SERPL CREATININE-BSD FRML MDRD: 83 ML/MIN/1.73M2
GLUCOSE BLD-MCNC: 106 MG/DL (ref 70–99)
HCO3 BLDV-SCNC: 25 MMOL/L (ref 21–28)
HCT VFR BLD AUTO: 38.7 % (ref 40–53)
HGB BLD-MCNC: 13.4 G/DL (ref 13.3–17.7)
HOLD SPECIMEN: NORMAL
HOLD SPECIMEN: NORMAL
IMM GRANULOCYTES # BLD: 0.1 10E3/UL
IMM GRANULOCYTES NFR BLD: 1 %
LACTATE SERPL-SCNC: 0.7 MMOL/L (ref 0.7–2)
LYMPHOCYTES # BLD AUTO: 2.1 10E3/UL (ref 0.8–5.3)
LYMPHOCYTES NFR BLD AUTO: 18 %
MCH RBC QN AUTO: 32.9 PG (ref 26.5–33)
MCHC RBC AUTO-ENTMCNC: 34.6 G/DL (ref 31.5–36.5)
MCV RBC AUTO: 95 FL (ref 78–100)
MONOCYTES # BLD AUTO: 1.2 10E3/UL (ref 0–1.3)
MONOCYTES NFR BLD AUTO: 10 %
NEUTROPHILS # BLD AUTO: 8 10E3/UL (ref 1.6–8.3)
NEUTROPHILS NFR BLD AUTO: 70 %
NRBC # BLD AUTO: 0 10E3/UL
NRBC BLD AUTO-RTO: 0 /100
O2/TOTAL GAS SETTING VFR VENT: 0 %
PCO2 BLDV: 38 MM HG (ref 40–50)
PH BLDV: 7.43 [PH] (ref 7.32–7.43)
PLATELET # BLD AUTO: 233 10E3/UL (ref 150–450)
PO2 BLDV: 61 MM HG (ref 25–47)
POTASSIUM BLD-SCNC: 4 MMOL/L (ref 3.4–5.3)
PROT SERPL-MCNC: 7.4 G/DL (ref 6.8–8.8)
RBC # BLD AUTO: 4.07 10E6/UL (ref 4.4–5.9)
SARS-COV-2 RNA RESP QL NAA+PROBE: NEGATIVE
SODIUM SERPL-SCNC: 137 MMOL/L (ref 133–144)
WBC # BLD AUTO: 11.5 10E3/UL (ref 4–11)

## 2021-10-14 PROCEDURE — 99285 EMERGENCY DEPT VISIT HI MDM: CPT | Mod: 25

## 2021-10-14 PROCEDURE — 85025 COMPLETE CBC W/AUTO DIFF WBC: CPT | Performed by: INTERNAL MEDICINE

## 2021-10-14 PROCEDURE — 258N000003 HC RX IP 258 OP 636: Performed by: EMERGENCY MEDICINE

## 2021-10-14 PROCEDURE — 250N000011 HC RX IP 250 OP 636: Performed by: EMERGENCY MEDICINE

## 2021-10-14 PROCEDURE — 96368 THER/DIAG CONCURRENT INF: CPT

## 2021-10-14 PROCEDURE — 36415 COLL VENOUS BLD VENIPUNCTURE: CPT | Performed by: EMERGENCY MEDICINE

## 2021-10-14 PROCEDURE — 87635 SARS-COV-2 COVID-19 AMP PRB: CPT | Performed by: EMERGENCY MEDICINE

## 2021-10-14 PROCEDURE — 80053 COMPREHEN METABOLIC PANEL: CPT | Performed by: INTERNAL MEDICINE

## 2021-10-14 PROCEDURE — 72193 CT PELVIS W/DYE: CPT

## 2021-10-14 PROCEDURE — 83605 ASSAY OF LACTIC ACID: CPT | Performed by: INTERNAL MEDICINE

## 2021-10-14 PROCEDURE — 96361 HYDRATE IV INFUSION ADD-ON: CPT

## 2021-10-14 PROCEDURE — 96375 TX/PRO/DX INJ NEW DRUG ADDON: CPT | Mod: 59

## 2021-10-14 PROCEDURE — 99207 PR CONSULT E&M CHANGED TO INITIAL LEVEL: CPT | Performed by: PHYSICIAN ASSISTANT

## 2021-10-14 PROCEDURE — 87040 BLOOD CULTURE FOR BACTERIA: CPT | Performed by: EMERGENCY MEDICINE

## 2021-10-14 PROCEDURE — C9803 HOPD COVID-19 SPEC COLLECT: HCPCS

## 2021-10-14 PROCEDURE — 96365 THER/PROPH/DIAG IV INF INIT: CPT | Mod: 59

## 2021-10-14 PROCEDURE — 36415 COLL VENOUS BLD VENIPUNCTURE: CPT | Performed by: INTERNAL MEDICINE

## 2021-10-14 PROCEDURE — 99221 1ST HOSP IP/OBS SF/LOW 40: CPT | Performed by: PHYSICIAN ASSISTANT

## 2021-10-14 PROCEDURE — 82803 BLOOD GASES ANY COMBINATION: CPT | Performed by: INTERNAL MEDICINE

## 2021-10-14 RX ORDER — CIPROFLOXACIN 500 MG/1
500 TABLET, FILM COATED ORAL 2 TIMES DAILY
Qty: 20 TABLET | Refills: 0 | Status: SHIPPED | OUTPATIENT
Start: 2021-10-14 | End: 2021-10-24

## 2021-10-14 RX ORDER — LIDOCAINE HYDROCHLORIDE AND EPINEPHRINE 10; 10 MG/ML; UG/ML
INJECTION, SOLUTION INFILTRATION; PERINEURAL
Status: DISCONTINUED
Start: 2021-10-14 | End: 2021-10-14 | Stop reason: HOSPADM

## 2021-10-14 RX ORDER — METRONIDAZOLE 500 MG/1
500 TABLET ORAL 3 TIMES DAILY
Qty: 30 TABLET | Refills: 0 | Status: SHIPPED | OUTPATIENT
Start: 2021-10-14 | End: 2021-10-24

## 2021-10-14 RX ORDER — IBUPROFEN 200 MG
400-600 TABLET ORAL EVERY 6 HOURS PRN
COMMUNITY
End: 2022-01-24

## 2021-10-14 RX ORDER — KETOROLAC TROMETHAMINE 15 MG/ML
15 INJECTION, SOLUTION INTRAMUSCULAR; INTRAVENOUS ONCE
Status: COMPLETED | OUTPATIENT
Start: 2021-10-14 | End: 2021-10-14

## 2021-10-14 RX ORDER — PRAZOSIN HYDROCHLORIDE 1 MG/1
1 CAPSULE ORAL AT BEDTIME
COMMUNITY
End: 2021-10-27

## 2021-10-14 RX ORDER — OXYCODONE HYDROCHLORIDE 5 MG/1
5 TABLET ORAL EVERY 6 HOURS PRN
Qty: 6 TABLET | Refills: 0 | Status: SHIPPED | OUTPATIENT
Start: 2021-10-14 | End: 2021-10-17

## 2021-10-14 RX ORDER — HYDROMORPHONE HYDROCHLORIDE 1 MG/ML
0.5 INJECTION, SOLUTION INTRAMUSCULAR; INTRAVENOUS; SUBCUTANEOUS
Status: DISCONTINUED | OUTPATIENT
Start: 2021-10-14 | End: 2021-10-14 | Stop reason: HOSPADM

## 2021-10-14 RX ORDER — IOPAMIDOL 755 MG/ML
86 INJECTION, SOLUTION INTRAVASCULAR ONCE
Status: COMPLETED | OUTPATIENT
Start: 2021-10-14 | End: 2021-10-14

## 2021-10-14 RX ORDER — ACETAMINOPHEN 500 MG
1000 TABLET ORAL ONCE
Status: DISCONTINUED | OUTPATIENT
Start: 2021-10-14 | End: 2021-10-14 | Stop reason: ALTCHOICE

## 2021-10-14 RX ADMIN — CEFEPIME HYDROCHLORIDE 2 G: 2 INJECTION, POWDER, FOR SOLUTION INTRAVENOUS at 11:19

## 2021-10-14 RX ADMIN — SODIUM CHLORIDE 1000 ML: 9 INJECTION, SOLUTION INTRAVENOUS at 10:21

## 2021-10-14 RX ADMIN — KETOROLAC TROMETHAMINE 15 MG: 15 INJECTION, SOLUTION INTRAMUSCULAR; INTRAVENOUS at 10:42

## 2021-10-14 RX ADMIN — IOPAMIDOL 86 ML: 755 INJECTION, SOLUTION INTRAVENOUS at 11:27

## 2021-10-14 RX ADMIN — METRONIDAZOLE 500 MG: 500 INJECTION, SOLUTION INTRAVENOUS at 12:08

## 2021-10-14 RX ADMIN — HYDROMORPHONE HYDROCHLORIDE 0.5 MG: 1 INJECTION, SOLUTION INTRAMUSCULAR; INTRAVENOUS; SUBCUTANEOUS at 10:21

## 2021-10-14 ASSESSMENT — ENCOUNTER SYMPTOMS
MYALGIAS: 1
FEVER: 1
ABDOMINAL PAIN: 0
DYSURIA: 0
VOMITING: 0
SHORTNESS OF BREATH: 0

## 2021-10-14 NOTE — CONSULTS
Hospitalist Consultation      Harrison Khan MRN# [id number]   YOB: 1985 Age: 36 year old   Date of Admission: 10/14/2021     Requesting Physician:  Dr. Brown  Reason for consult: Asked to admit pt for further management of perianal abscess           Assessment and Plan:   Harrison Khan is a 36 year old male with a PMHx of depression, anxiety and PTSD, who presented to the ED for further management of an abscess on his buttocks. He states it developed 5 days ago, seen at Urgent Care, started on Doxycycline, developed fever so returned to [place] ED last night, had needle aspiration with brief improvement. He presented here with low grade fever and tachycardic, VS otherwise stable. Labs were unremarkable other than mild leukocytosis with WBC of 11.5. CT abd pelvis was obtained and showed findings consistent with R sided perianal abscess. CRS was consulted in the ED and performed bedside I&D of the abscess with good drainage and no need for further formal I&D at this time. Ok with transition to oral abx and discharge home.     1. Right perianal abscess - s/p bedside I&D by CRS, reported good drainage, no further formal I&D needed at this time. No hx of MRSA or DM, CRS ok with transition to oral abx and discharge home. VS improved and pain improved as well, pt is comfortable with discharge. Will discharge home on 10 days of Cipro and Flagyl. CRS discussed wound cares. CRS will see pt in clinic on Mon or Tues for wound check. Short script given for oxycodone if needed but he likely will not need further analgesia other than Tylenol #3 he already has at home.                History of Present Illness:   Harrison Khan is a 36 year old male with a PMHx of depression, anxiety and PTSD, who presented to the ED for further management of an abscess on his buttocks. He states it developed 5 days ago, seen at Urgent Care, started on Doxycycline, developed fever so returned to [place] ED last night,  had needle aspiration with brief improvement. He presented here with low grade fever and tachycardic, VS otherwise stable. Labs were unremarkable other than mild leukocytosis with WBC of 11.5. CT abd pelvis was obtained and showed findings consistent with R sided perianal abscess. CRS was consulted in the ED and performed bedside I&D of the abscess with good drainage and no need for further formal I&D at this time. Ok with transition to oral abx and discharge home. He denies chest pain, SOB, cough, abdominal pain, nausea, vomiting, or diarrhea.              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      Chronic insomnia 3/3/2014     Depressive disorder 1-1-2005     Generalised anxiety disorder 7/6/2012     Migraine     Occasional hemiplegic characteristics     Obsessive-compulsive disorders     sari high               Past Surgical History:     Past Surgical History:   Procedure Laterality Date     ARTHROSCOPY SHOULDER RT/LT      RT x 2                 Social History:     Social History     Tobacco Use     Smoking status: Current Every Day Smoker     Packs/day: 0.50     Years: 0.00     Pack years: 0.00     Types: Cigarettes     Smokeless tobacco: Former User     Types: Chew     Quit date: 4/17/2018   Vaping Use     Vaping Use: Never used   Substance Use Topics     Alcohol use: Yes     Alcohol/week: 0.0 standard drinks     Comment: daily-5-6 mix drinks per day.      Drug use: No                 Family History:   I have reviewed this patient's family history  family history includes Anxiety Disorder in his father and mother; Asthma in his father and mother; Cardiovascular in his maternal grandfather, maternal grandmother, paternal grandfather, and paternal grandmother; Depression in his father and mother; Diabetes in his father; Hyperlipidemia in his father and mother; Hypertension in his  father and mother; Obesity in his father and mother.            Allergies:     Allergies   Allergen Reactions     Wellbutrin [Bupropion] Hives     Bactrim [Sulfamethoxazole W/Trimethoprim]      Penicillins              Medications:     Prior to Admission medications    Medication Sig Last Dose Taking? Auth Provider   acetaminophen-codeine (TYLENOL #3) 300-30 MG tablet Take 1 tablet by mouth every 6 hours as needed for severe pain 10/14/2021 at 0700 Yes Anabel Wilson PA-C   ciprofloxacin (CIPRO) 500 MG tablet Take 1 tablet (500 mg) by mouth 2 times daily for 10 days  Yes Evie Allan PA-C   gabapentin (NEURONTIN) 300 MG capsule Take 300mg at noon, 300mg at 7pm and then 900mg (three caps) at bedtime 10/13/2021 at hs Yes Eliseo Benitez PA-C   ibuprofen (ADVIL/MOTRIN) 200 MG tablet Take 400-600 mg by mouth every 6 hours as needed for mild pain 10/13/2021 Yes Unknown, Entered By History   LORazepam (ATIVAN) 1 MG tablet Take 1 tablet (1 mg) by mouth every 6 hours as needed for anxiety 10/13/2021 at Unknown time Yes Eliseo Benitez PA-C   Magnesium Chloride (MAGNESIUM DR PO) Take 1 tablet by mouth daily 10/14/2021 at Unknown time Yes Unknown, Entered By History   metroNIDAZOLE (FLAGYL) 500 MG tablet Take 1 tablet (500 mg) by mouth 3 times daily for 10 days  Yes Evie Allan PA-C   nicotine polacrilex (NICORETTE) 2 MG gum Place 1 each (2 mg) inside cheek as needed for smoking cessation 10/14/2021 at Unknown time Yes Eliseo Benitez PA-C   oxyCODONE (ROXICODONE) 5 MG tablet Take 1 tablet (5 mg) by mouth every 6 hours as needed for severe pain  Yes Evie Allan PA-C   prazosin (MINIPRESS) 1 MG capsule Take 1 mg by mouth At Bedtime Take with 2mg capsule for a total dose of 3mg every night 10/13/2021 at pm Yes Unknown, Entered By History   prazosin (MINIPRESS) 2 MG capsule Take 1 capsule (2 mg) by mouth At Bedtime 10/13/2021 at pm Yes Eliseo Benitez PA-C   QUEtiapine  (SEROQUEL) 25 MG tablet Take 1-2 tablets (25-50 mg) by mouth At Bedtime 10/13/2021 at pm Yes Eliseo Benitez PA-C   venlafaxine (EFFEXOR-XR) 37.5 MG 24 hr capsule Take 3 capsules (112.5 mg) by mouth daily 10/14/2021 at am Yes Eliseo Benitez PA-C   zaleplon (SONATA) 10 MG capsule Take 20mg at bedtime  Patient taking differently: Take 10-20 mg by mouth At Bedtime Take 20mg at bedtime 10/13/2021 at Unknown time Yes Eliseo Benitez PA-C               Review of Systems:   A comprehensive greater than 10 system review of systems was carried out.  Pertinent positives and negatives are noted above.  Otherwise negative for contributory info.            Physical Exam:   Vitals were reviewed  Blood pressure 115/68, pulse 86, temperature 98.5  F (36.9  C), temperature source Oral, resp. rate 18, SpO2 97 %.  Exam:    GENERAL:  Comfortable.  PSYCH: pleasant, oriented, No acute distress.  HEART:  RRR  LUNGS:  Normal Respiratory effort.  :  S/p perianal abscess drainage. No significant erythema noted laterally from abscess area on buttocks. No significant ongoing drainage noted.  SKIN:  Dry to touch, No other obvious rash, wound or ulcerations.  NEUROLOGIC:  Grossly intact            Data:   Past 24 hours labs, studies, and imaging were reviewed.  Recent Labs   Lab 10/14/21  1018   PHV 7.43   PO2V 61*   PCO2V 38*   HCO3V 25     Recent Labs   Lab 10/14/21  1018   WBC 11.5*   HGB 13.4   HCT 38.7*   MCV 95        No results for input(s): CULT in the last 168 hours.  Recent Labs   Lab 10/14/21  1018      POTASSIUM 4.0   CHLORIDE 105   CO2 25   ANIONGAP 7   *   BUN 12   CR 1.14   GFRESTIMATED 83   OPAL 9.1   PROTTOTAL 7.4   ALBUMIN 3.4   BILITOTAL 1.0   ALKPHOS 102   AST 43   ALT 92*       Evie Allan PA-C    Pt discussed with Dr. De who agrees with the care as discussed above.

## 2021-10-14 NOTE — ED NOTES
[facility]  ED Nurse Handoff Report    Harrison Khan is a 36 year old male   ED Chief complaint: No chief complaint on file.  . ED Diagnosis:   Final diagnoses:   Cellulitis of right buttock   Abscess of right buttock   Sepsis, due to unspecified organism, unspecified whether acute organ dysfunction present (H)     Allergies:   Allergies   Allergen Reactions     Wellbutrin [Bupropion] Hives     Bactrim [Sulfamethoxazole W/Trimethoprim]      Penicillins        Code Status: Full Code  Activity level - Baseline/Home:  Independent. Activity Level - Current:   Independent. Lift room needed: No. Bariatric: No   Needed: No   Isolation: No. Infection: Not Applicable.     Vital Signs:   Vitals:    10/14/21 1115 10/14/21 1120 10/14/21 1140 10/14/21 1154   BP:    115/68   Pulse:   89 86   Resp:       Temp:    98.5  F (36.9  C)   TempSrc:    Oral   SpO2: 94% 96%  97%       Cardiac Rhythm:  ,      Pain level:    Patient confused: No. Patient Falls Risk: No.   Elimination Status: Has voided   Patient Report - Initial Complaint: Abscess to R buttock for 5 days. Pt was seen at a different ED yesterday and had some fluid drained. Pain became unbearable last night. Temp of 100.6 yesterday at 3pm. Pt had lab work and lactate done yesterday. ABC Intact.  Focused Assessment: A&Ox4. +right buttock abscess.   Tests Performed: Labs, CT. Abnormal Results:   Abnormal Labs Reviewed   COMPREHENSIVE METABOLIC PANEL - Abnormal; Notable for the following components:       Result Value    Glucose 106 (*)     ALT 92 (*)     All other components within normal limits   BLOOD GAS VENOUS - Abnormal; Notable for the following components:    pCO2 Venous 38 (*)     pO2 Venous 61 (*)     All other components within normal limits   CBC WITH PLATELETS AND DIFFERENTIAL - Abnormal; Notable for the following components:    WBC Count 11.5 (*)     RBC Count 4.07 (*)     Hematocrit 38.7 (*)     Absolute Immature Granulocytes 0.1 (*)      All other components within normal limits     CT Pelvis Soft Tissue w Contrast   Final Result   IMPRESSION: Right-sided perianal abscess.      KIN MENA MD            SYSTEM ID:  [id number]      .   Treatments provided: IV ABX, Fluid. I&D.   Family Comments: N/A.   OBS brochure/video discussed/provided to patient:  Yes  ED Medications:   Medications   sodium chloride (PF) 0.9% PF flush 3 mL (has no administration in time range)   sodium chloride (PF) 0.9% PF flush 3 mL (has no administration in time range)   HYDROmorphone (PF) (DILAUDID) injection 0.5 mg (0.5 mg Intravenous Given 10/14/21 1021)   metroNIDAZOLE (FLAGYL) infusion 500 mg (500 mg Intravenous New Bag 10/14/21 1208)   lidocaine 1% with EPINEPHrine 1:100,000 1 %-1:[id number] injection (has no administration in time range)   silver nitrate (ARZOL) Misc 4 applicator (has no administration in time range)   0.9% sodium chloride BOLUS (0 mLs Intravenous Stopped 10/14/21 1137)   ceFEPIme (MAXIPIME) 2 g vial to attach to  ml bag for ADULTS or 50 ml bag for PEDS (2 g Intravenous New Bag 10/14/21 1119)   ketorolac (TORADOL) injection 15 mg (15 mg Intravenous Given 10/14/21 1042)   iopamidol (ISOVUE-370) solution 86 mL (86 mLs Intravenous Given 10/14/21 1127)   sodium chloride (PF) 0.9% PF flush 10 mL (65 mLs Intravenous Given 10/14/21 1128)     Drips infusing:  No  For the majority of the shift, the patient's behavior Green. Interventions performed were N/A.    Sepsis treatment initiated: No     Patient tested for COVID 19 prior to admission: YES    ED Nurse Name/Phone Number: Na Hardy RN,   12:35 PM

## 2021-10-14 NOTE — PHARMACY-ADMISSION MEDICATION HISTORY
Admission medication history interview status for this patient is complete. See [facility] admission navigator for allergy information, prior to admission medications and immunization status.     Medication history interview source(s):Patient  Medication history resources (including written lists, pill bottles, clinic record):EPIC list, Clinic notes, Sure Scripts  [facility]    Changes made to PTA medication list:  Added: prazosin 1mg at bedtime for a total of 3mg at bedtime, magnesium, ibuprofen  Deleted: -----  Changed: sonata to 10-20mg qhs    Actions taken by pharmacist (provider contacted, etc):None     Additional medication history information:None    Medication reconciliation/reorder completed by provider prior to medication history? No      For patients on insulin therapy:N  Prior to Admission medications    Medication Sig Last Dose Taking? Auth Provider   acetaminophen-codeine (TYLENOL #3) 300-30 MG tablet Take 1 tablet by mouth every 6 hours as needed for severe pain 10/14/2021 at 0700 Yes Anabel Wilson PA-C   doxycycline hyclate (VIBRAMYCIN) 100 MG capsule Take 1 capsule (100 mg) by mouth 2 times daily for 10 days 10/14/2021 at am Yes Anabel Wilson PA-C   gabapentin (NEURONTIN) 300 MG capsule Take 300mg at noon, 300mg at 7pm and then 900mg (three caps) at bedtime 10/13/2021 at hs Yes Eliseo Benitez PA-C   ibuprofen (ADVIL/MOTRIN) 200 MG tablet Take 400-600 mg by mouth every 6 hours as needed for mild pain 10/13/2021 Yes Unknown, Entered By History   LORazepam (ATIVAN) 1 MG tablet Take 1 tablet (1 mg) by mouth every 6 hours as needed for anxiety 10/13/2021 at Unknown time Yes Eliseo Benitez PA-C   Magnesium Chloride (MAGNESIUM DR PO) Take 1 tablet by mouth daily 10/14/2021 at Unknown time Yes Unknown, Entered By History   nicotine polacrilex (NICORETTE) 2 MG gum Place 1 each (2 mg) inside cheek as needed for smoking cessation 10/14/2021 at Unknown time Yes  Eliseo Benitez PA-C   prazosin (MINIPRESS) 1 MG capsule Take 1 mg by mouth At Bedtime Take with 2mg capsule for a total dose of 3mg every night 10/13/2021 at pm Yes Unknown, Entered By History   prazosin (MINIPRESS) 2 MG capsule Take 1 capsule (2 mg) by mouth At Bedtime 10/13/2021 at pm Yes Eliseo Benitez PA-C   QUEtiapine (SEROQUEL) 25 MG tablet Take 1-2 tablets (25-50 mg) by mouth At Bedtime 10/13/2021 at pm Yes Eliseo Benitez PA-C   venlafaxine (EFFEXOR-XR) 37.5 MG 24 hr capsule Take 3 capsules (112.5 mg) by mouth daily 10/14/2021 at am Yes Eliseo Benitez PA-C   zaleplon (SONATA) 10 MG capsule Take 20mg at bedtime  Patient taking differently: Take 10-20 mg by mouth At Bedtime Take 20mg at bedtime 10/13/2021 at Unknown time Yes Eliseo Benitez PA-C

## 2021-10-14 NOTE — ED PROVIDER NOTES
History   Chief Complaint:  Abscess     The history is provided by the patient.      Harrison Khan is a 36 year old male with history of alcohol dependence, obesity who presents with abscess. The patient reports that he has had an abscess to his right buttock for about 5 days. It does not radiate to his scrotum. Yesterday, after visiting Urgent Care, he states that he developed a fever and increased pain to the area. He then visited [facility] last night, where he received an ultrasound and needle drainage. His symptoms briefly improved until about 9.5 hours ago. The patient has never had these symptoms before. He reports no history of diabetes. The patient is reportedly vaccinated for Covid-19. He reports no dysuria, vomiting, or abdominal pain. He also denies chest pain or shortness of breath.    Review of Systems   Constitutional: Positive for fever.   Respiratory: Negative for shortness of breath.    Cardiovascular: Negative for chest pain.   Gastrointestinal: Negative for abdominal pain and vomiting.   Genitourinary: Negative for dysuria.   Musculoskeletal: Positive for myalgias (R buttock).   All other systems reviewed and are negative.    Allergies:  Penicillin  Wellbutrin  Bactrim    Medications:  Vibramycin  Gabapentin  Ativan  Nicorette  Prazosin  Seroquel  Effexor  Sonata    Past Medical History:     ADD  ADHD  Chronic insomnia  Depression  ELY  Hemiplegic migraine  OCD  GERD  Panic disorder  Obesity  Heartburn  PTSD  Alcohol dependence  Shift work sleep disorder  Hypokalemia  Tobacco abuse    Past Surgical History:    Right shoulder arthroscopy x2  Left shoulder scope x2  Tympanostomy   Adenoidectomy     Family History:    Mother: Hypertension, hyperlipidemia, depression, anxiety disorder, asthma, obesity  Father: Diabetes, hypertension, hyperlipidemia, depression, anxiety disorder, asthma, obesity  Brother: Seizures, OCD, depression     Social History:  Presents to the emergency department  alone  Arrives via car    Physical Exam     Patient Vitals for the past 24 hrs:   BP Temp Temp src Pulse Resp SpO2   10/14/21 1345 139/78 -- -- -- -- 95 %   10/14/21 1300 -- -- -- -- -- 97 %   10/14/21 1245 -- -- -- -- -- 95 %   10/14/21 1230 -- -- -- -- -- 96 %   10/14/21 1215 -- -- -- -- -- 96 %   10/14/21 1200 115/68 -- -- -- -- 97 %   10/14/21 1154 115/68 98.5  F (36.9  C) Oral 86 -- 97 %   10/14/21 1145 -- -- -- -- -- 97 %   10/14/21 1140 -- -- -- 89 -- --   10/14/21 1120 -- -- -- -- -- 96 %   10/14/21 1115 -- -- -- -- -- 94 %   10/14/21 1110 -- -- -- -- -- 94 %   10/14/21 1100 -- -- -- -- -- 94 %   10/14/21 1030 -- -- -- -- -- 95 %   10/14/21 0920 (!) 154/95 100.2  F (37.9  C) Oral (!) 129 18 97 %     Physical Exam  General: Alert, appears well-developed and well-nourished. Cooperative.     In moderate distress  HEENT:  Head:  Atraumatic  Ears:  External ears are normal  Mouth/Throat:  Oropharynx is without erythema or exudate and mucous membranes are moist.   Eyes:   Conjunctivae normal and EOM are normal. No scleral icterus.  CV:  Tachycardic rate, regular rhythm, normal heart sounds and radial pulses are 2+ and symmetric.  No murmur.  Resp:  Breath sounds are clear bilaterally    Non-labored, no retractions or accessory muscle use  GI:  Obese.  Abdomen is soft, no distension, no tenderness. No rebound or guarding.  No CVA tenderness bilaterally  Rectal: Large right buttock abscess, ?akbar-rectal abscess as cellulitis and induration tracks towards midline concern for rapid progression of erythema and induration.    MS:  Normal range of motion. No edema.    Normal strength in all 4 extremities.     Back atraumatic.    No midline cervical, thoracic, or lumbar tenderness  Skin:  Warm and dry.  No rash or lesions noted.  Neuro: Alert. Normal strength.  GCS: 15  Psych:  Normal mood and affect.    Emergency Department Course     Imaging:  CT Pelvis Soft Tissue w Contrast   Right-sided perianal abscess.  Report per  radiology    Laboratory:    CBC: WBC 11.5 (H), HGB 13.4,      CMP: Glucose 106 (H) ALT 92 (H) o/w WNL (Creatinine 1.14)     Lactic acid (result time 1039) 0.7     Blood gas venous: pCO2 38 (L) pO2 61 (H) o/w WNL  Blood cultures Pending x2    Asymptomatic COVID19 Virus PCR by nasopharyngeal swab Negative       Emergency Department Course:  Reviewed:  I reviewed nursing notes, vitals, past medical history and Care Everywhere    Assessments:  0940 I obtained history and examined the patient as noted above.   1325 I rechecked the patient and updated him on plans for discharge.    Consults:  1104 I spoke with Evie Allan PA-C for Dr. Gamboa, hospitalist, regarding the patient.     Interventions:  1021 NS bolus 1L IV  1021 Dilaudid 0.5mg IV  1042 Toradol 15mg IV  1119 Maxipeme 2g IV  1208 Flagyl 500mg IV    Disposition:  The patient was discharged to home.    Impression & Plan     Medical Decision Making:  Harrison Khan is a 36 year old male with no pertinent past medical history who presents with significant right buttock abscess. Patient had a broad septic work-up performed, as he was tachycardic and febrile on arrival.  There is a significant 4 x 1 x 3.1 cm abscess to the right gluteal soft tissues, consistent with a perianal abscess.  Thankfully no evidence of perirectal abscess and so operative intervention in the OR is not indicated. Colorectal surgery evaluated the patient at bedside and agreed to bedside incision and drainage of the perianal abscess today.  Initially I had felt the patient would require admission to the hospital for cellulitis and abscess, although in a shared decision-making model with the hospitalist service, colorectal surgery, myself and the patient we agreed to oral antibiotics and close outpatient follow-up with colorectal surgery early next week for recheck.  Patient will certainly return sooner to the emergency department he develops worsening cellulitis, recurrent  abscess, or continued systemic symptoms such as fever or worsening pain.  Discharged with oral antibiotics and pain medications.  Of note blood cultures are in process at time of discharge and should these return positive for concerning bacteremia he may need to return for reassessment and potential admission for IV antibiotics.    Diagnosis:    ICD-10-CM    1. Perianal abscess  K61.0 Colorectal Surgery Referral     ciprofloxacin (CIPRO) 500 MG tablet     metroNIDAZOLE (FLAGYL) 500 MG tablet     oxyCODONE (ROXICODONE) 5 MG tablet   2. Cellulitis of right buttock  L03.317    3. Abscess of right buttock  L02.31    4. Sepsis, due to unspecified organism, unspecified whether acute organ dysfunction present (H)  A41.9      Discharge Medications:  Discharge Medication List as of 10/14/2021  1:33 PM      START taking these medications    Details   ciprofloxacin (CIPRO) 500 MG tablet Take 1 tablet (500 mg) by mouth 2 times daily for 10 days, Disp-20 tablet, R-0, E-Prescribe      metroNIDAZOLE (FLAGYL) 500 MG tablet Take 1 tablet (500 mg) by mouth 3 times daily for 10 days, Disp-30 tablet, R-0, E-Prescribe      oxyCODONE (ROXICODONE) 5 MG tablet Take 1 tablet (5 mg) by mouth every 6 hours as needed for severe pain, Disp-6 tablet, R-0, Local Print           Scribe Disclosure:  I, Jerel Landaverde, am serving as a scribe at 9:36 AM on 10/14/2021 to document services personally performed by Raffy Brown MD based on my observations and the provider's statements to me.              Raffy Brown MD  10/14/21 9640

## 2021-10-14 NOTE — ED TRIAGE NOTES
Abscess to R buttock for 5 days. Pt was seen at a different ED yesterday and had some fluid drained. Pain became unbearable last night. Temp of 100.6 yesterday at 3pm. Pt had lab work and lactate done yesterday. ABC Intact.

## 2021-10-14 NOTE — TELEPHONE ENCOUNTER
Seen in urgent care yesterday for abscess for buttocks. Then went to [facility] ER they aspirated from the abscess on his buttocks. This morning the pain is at a ten. He was wondering if he should be seen in the ER again. I told him for pain management and then they can possibly aspirate or drain the abscess further. He will head to the [facility] ER this morning after he showers.  Sabrina Paulino RN  [facility]    Additional Information    Negative: Nursing judgment    Negative: Nursing judgment    Negative: Nursing judgment    Negative: Nursing judgment    Information only question and nurse able to answer    Protocols used: INFORMATION ONLY CALL - NO TRIAGE-A-OH

## 2021-10-14 NOTE — ED NOTES
Red, painful, swollen area on right buttock, small amount of drainage. He had a needle aspiration of this area last evening but now the pain has worsened.

## 2021-10-14 NOTE — CONSULTS
[facility]  Colon and Rectal Surgery Consult Note  Name: Harrison Khan    MRN: [id number]  YOB: 1985    Age: 36 year old  Date of admission: 10/14/2021  Primary care provider: Eliseo Benitez     Requesting Physician:  Dr. Brown  Reason for consult:  Perirectal abscess           History of Present Illness:   Harrison Khan is a 36 year old male, seen at the request of Dr. Brown, who presents with 5 days of worsening right-sided buttock pain and swelling.  He has never had symptoms like this before. He has been having chills at home but has not measured his temperature.  No dysuria.  No change in bowel movements.  He had not been having any drainage until after a procedure overnight, now he is noticing just a small amount of drainage.  He presented to Urgent Care yesterday and was given prescriptions for Doxycycline and Tylenol #3.  He then had worsening pain and presented to the [facility] ER overnight where a needle aspiration was done.  He had some improvement in the pain for a few hours but his pain started worsening around midnight and he decided to present to the [facility] ER for further evaluation and treatment.  He had a fever of 100.6 degrees at the [facility] ER.  Temperature here today is 100.2.  Pulse is 129.    Temp 100.2  Pulse 129  WBC 11.5    CT of the abdomen and pelvis shows a 4.1 x 3.1cm loculated fluid density collection in the right gluteal soft tissues that extends towards the right side of the anus with no intrapelvic extension, no pelvic adenopathy.      Colonoscopy History: none     Surgical History: no anorectal surgery             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      Chronic insomnia 3/3/2014     Depressive disorder 1-1-2005     Generalised anxiety disorder 7/6/2012     Migraine     Occasional  hemiplegic characteristics     Obsessive-compulsive disorders     sari high             Past Surgical History:     Past Surgical History:   Procedure Laterality Date     ARTHROSCOPY SHOULDER RT/LT      RT x 2               Social History:     Social History     Tobacco Use     Smoking status: Current Every Day Smoker     Packs/day: 0.50     Years: 0.00     Pack years: 0.00     Types: Cigarettes     Smokeless tobacco: Former User     Types: Chew     Quit date: 4/17/2018   Substance Use Topics     Alcohol use: Yes     Alcohol/week: 0.0 standard drinks     Comment: daily-5-6 mix drinks per day.              Family History:     Family History   Problem Relation Age of Onset     Hypertension Mother      Hyperlipidemia Mother      Depression Mother      Anxiety Disorder Mother      Asthma Mother      Obesity Mother      Hypertension Father      Diabetes Father      Hyperlipidemia Father      Depression Father      Anxiety Disorder Father      Asthma Father      Obesity Father      Cardiovascular Maternal Grandmother      Cardiovascular Maternal Grandfather      Cardiovascular Paternal Grandmother      Cardiovascular Paternal Grandfather              Allergies:     Allergies   Allergen Reactions     Wellbutrin [Bupropion] Hives     Bactrim [Sulfamethoxazole W/Trimethoprim]      Penicillins              Medications:       sodium chloride 0.9%  1,000 mL Intravenous Once     acetaminophen  1,000 mg Oral Once     ceFEPIme (MAXIPIME) IV  2 g Intravenous Once     metroNIDAZOLE  500 mg Intravenous Once     sodium chloride (PF)  3 mL Intracatheter Q8H             Review of Systems:   A comprehensive greater than 10 system review of systems was carried out.  Pertinent positives and negatives are noted above.  Otherwise negative for contributory info.            Physical Exam:     Blood pressure (!) 154/95, pulse (!) 129, temperature 100.2  F (37.9  C), temperature source Oral, resp. rate 18, SpO2 97 %.  No intake or output data  in the 24 hours ending 10/14/21 1033    EXAM:  GEN: Awake alert and oriented, appears stated age, laying bed, appears comfortable   EYES: pupils equal and round, sclerae anicteric   PULM: Non-labored breathing with normal respiratory effort  CVS: reg rate and rhythm, no peripheral edema  ABD: Soft, non-tender, non-distended. No rebound or guarding   RECTAL: Chaperone present.  There is a large abscess on the right buttock tracking toward the anus with fluctuance and induration. There is cellulitis of the skin.  There is a few drops of purulent material on the skin but no clear external opening.  Effacing the anus is uncomfortable for the patient.  Digital rectal exam is deferred secondary to pain.   NEURO: CN II-XII grossly intact  MSK: extremeties with no clubbing, cyanosis or edema  PSYCH: responsive, alert, cooperative; oriented x3; appropriate mood and affect  EXT/SKIN: inspection reveals no rashes, lesions or ulcers, normal coloring      PROCEDURE NOTE  Incision and Drainage of a perirectal abscess  Skin was prepped with alcohol wipe.  Abscess was located in the right anterior position and 4cc Lidocaine with epinephrine was infiltrated into the area of maximum fluctuance.  1cm circular incision was made with immediate return of copious foul-smelling pus. Hemostat as used to break up loculations.  There was minor bleeding of the skin edges and hemostasis was achieved with pressure and silver nitrate.  Cellulitis was significantly improved following the procedure.  He tolerated the procedure well and there were no complications.            Data Reviewed:     Recent Results (from the past 24 hour(s))   CT Pelvis Soft Tissue w Contrast    Narrative    CT PELVIS SOFT TISSUE WITH CONTRAST 10/14/2021 11:35 AM    CLINICAL HISTORY: Abscess, anal or rectal. Right buttock perianal  abscess and cellulitis.    TECHNIQUE: CT scan of the pelvis was performed following injection of  IV contrast. Multiplanar reformats were  obtained. Dose reduction  techniques were used.  CONTRAST: 86mL Isovue-370    COMPARISON: 6/8/2010.    FINDINGS: There is a 4.1 x 3.1 cm loculated fluid density collection  in the right gluteal soft tissues that extends towards the right side  of the anus. No intrapelvic extension. No pelvic adenopathy or free  fluid. Visualized bones are unremarkable.      Impression    IMPRESSION: Right-sided perianal abscess.    KIN MENA MD         SYSTEM ID:  [id number]       Recent Labs   Lab 10/14/21  1018   WBC 11.5*   HGB 13.4   HCT 38.7*   MCV 95        Recent Labs   Lab 10/14/21  1018      POTASSIUM 4.0   CHLORIDE 105   CO2 25   ANIONGAP 7   *   BUN 12   CR 1.14   GFRESTIMATED 83   OPAL 9.1   PROTTOTAL 7.4   ALBUMIN 3.4   BILITOTAL 1.0   ALKPHOS 102   AST 43   ALT 92*         Assessment and Plan:   Kwaku is a 36 year old male who presents with a perirectal abscess.  CT scan and labs reviewed.  Incision and drainage was recommended. The procedure was reviewed in detail and he wished to proceed.  He tolerated the procedure well.  Wound care was reviewed.  Risk of recurrence and fistula was reviewed.  He should follow up next week for a wound check.  Would recommend a 10-day course of antibiotics.  Ok to discharge home from colorectal perspective.        Plan:  1. Surgery: I&D of perirectal abscess done bedside   2. Diet: regular   3. IV Fluids: no change  4. Antibiotics:  Recommend 10 day course   5. Medications: Continue home meds  6. Labs:   - Reviewed: by myself   - Ordered: none   7. Imaging:   - I have personally viewed: CT abd/pelvis  - Ordered: none   8. Activity: ambulate as tolerated, encourage OOB  9. This plan has been discussed with Dr. Brown and Dr. Nash    Patient specific identified risk factors considered as part of today s evaluation include: smoker, BMI >40    Time spent on consultation: 55 minutes, greater than 50 percent of the total encounter time is spent in counseling and/or  coordination of care          Caroline Pearce PA-C  [facility]  Phone:  [phone]

## 2021-10-19 LAB
BACTERIA BLD CULT: NO GROWTH
BACTERIA BLD CULT: NO GROWTH

## 2021-10-20 ENCOUNTER — PATIENT OUTREACH (OUTPATIENT)
Dept: NURSING | Facility: CLINIC | Age: 36
End: 2021-10-20
Payer: COMMERCIAL

## 2021-10-20 NOTE — PROGRESS NOTES
Clinic Care Coordination Contact    Follow Up Progress Note      Assessment: [facility] outreached to pt to check on status and assess for needs. Pt indicates he is doing better in that he has been plugged into a CD counselor as well as a Trauma Therapist. Pt states he continues to work with employer to process leave and eventually group home.     Pt denied any needs at this time.     Care Gaps:    Health Maintenance Due   Topic Date Due     Pneumococcal Vaccine: Pediatrics (0 to 5 Years) and At-Risk Patients (6 to 64 Years) (1 of 2 - PPSV23) Never done     PREVENTIVE CARE VISIT  10/16/2019           Goals addressed this encounter:   Goals Addressed                    This Visit's Progress       COMPLETED: 1. Patient achieves sleep pattern objective   100%      Goal Statement: I will continue to work toward establishing a positive routine and plan to improve my sleep hygiene over the next 6 moths.   Date Goal set: 7/27/2021  Barriers: Trauma from career path. Sleep study was inconclusive for resolution to current sleep concerns.  Strengths: Accepting of care coordination. Sees a provider for Trauma therapy. Takes medications for sleep.   Date to Achieve By: 1/1/2022  Patient expressed understanding of goal: yes  Action steps to achieve this goal:  1. I will follow up with scheduled appointments as recommended  2. I will take medications as prescribed.   3. I will contact my care team with questions, concerns, support needs.   4. I will use the clinic as a resource and I understand I can contact my clinic with 24/7 after hours services available.   5. I will discuss, review, schedule and complete any recommended overdue health maintenance with my provider/care team.  6. I will continue to outreach to care coordination as needed for additional resources or supports.          COMPLETED: 2. Mental Health Management (pt-stated)   100%      Goal Statement: I will continue to take steps to further support my sobriety over the  next 6 months.   Date Goal set: 7/27/2021  Barriers: Career field might conflict with ability to successfully complete group work/treatment most effectively.   Strengths: Strong support from spouse. Accepting of additional CD treatment/resources. Accepting of Care Coordination.   Date to Achieve By: 1/1/2022  Patient expressed understanding of goal: yes  Action steps to achieve this goal:  1. I will continue to follow-up with mental health provider for Trauma Therapy as recommended/scheduled.   2. I will continue to take medications as prescribed.   3. I will continue to work with Care Team to assist in improving my sleep hygiene.   4. I will review additional CD treatment options and consider establishing with supports I think would be beneficial to me.               Intervention/Education provided during outreach: Care Coordination contact and availability.      Outreach Frequency: 2 months    Plan: Pt to continue to follow up with current supports as recommended and scheduled. Care Coordinator will follow up in 2 months.     Roc Bettencourt [facility]  Clinic Care Coordinator  [facility]  [facility]  [phone]  [email or web address]

## 2021-10-25 ENCOUNTER — TRANSFERRED RECORDS (OUTPATIENT)
Dept: HEALTH INFORMATION MANAGEMENT | Facility: CLINIC | Age: 36
End: 2021-10-25
Payer: COMMERCIAL

## 2021-10-27 ENCOUNTER — VIRTUAL VISIT (OUTPATIENT)
Dept: FAMILY MEDICINE | Facility: CLINIC | Age: 36
End: 2021-10-27
Payer: COMMERCIAL

## 2021-10-27 DIAGNOSIS — F51.04 CHRONIC INSOMNIA: ICD-10-CM

## 2021-10-27 DIAGNOSIS — F41.0 PANIC DISORDER WITHOUT AGORAPHOBIA: ICD-10-CM

## 2021-10-27 DIAGNOSIS — F32.1 MODERATE MAJOR DEPRESSION (H): ICD-10-CM

## 2021-10-27 DIAGNOSIS — F43.10 PTSD (POST-TRAUMATIC STRESS DISORDER): ICD-10-CM

## 2021-10-27 PROCEDURE — 99214 OFFICE O/P EST MOD 30 MIN: CPT | Mod: 95 | Performed by: PHYSICIAN ASSISTANT

## 2021-10-27 RX ORDER — VENLAFAXINE HYDROCHLORIDE 37.5 MG/1
112.5 CAPSULE, EXTENDED RELEASE ORAL DAILY
Qty: 270 CAPSULE | Refills: 1 | Status: SHIPPED | OUTPATIENT
Start: 2021-10-27 | End: 2021-11-12 | Stop reason: DRUGHIGH

## 2021-10-27 RX ORDER — PRAZOSIN HYDROCHLORIDE 1 MG/1
1 CAPSULE ORAL AT BEDTIME
Qty: 90 CAPSULE | Refills: 1 | Status: SHIPPED | OUTPATIENT
Start: 2021-10-27

## 2021-10-27 RX ORDER — LORAZEPAM 1 MG/1
1 TABLET ORAL EVERY 6 HOURS PRN
Qty: 30 TABLET | Refills: 0 | Status: SHIPPED | OUTPATIENT
Start: 2021-10-27 | End: 2021-11-13

## 2021-10-27 RX ORDER — ZALEPLON 10 MG/1
CAPSULE ORAL
Qty: 60 CAPSULE | Refills: 0 | Status: SHIPPED | OUTPATIENT
Start: 2021-10-27 | End: 2021-11-12 | Stop reason: ALTCHOICE

## 2021-10-27 RX ORDER — PRAZOSIN HYDROCHLORIDE 2 MG/1
2 CAPSULE ORAL AT BEDTIME
Qty: 90 CAPSULE | Refills: 1 | Status: SHIPPED | OUTPATIENT
Start: 2021-10-27

## 2021-10-27 NOTE — PROGRESS NOTES
Kwaku is a 36 year old who is being evaluated via a billable video visit.      How would you like to obtain your AVS? MyChart  If the video visit is dropped, the invitation should be resent by: Text to cell phone: [phone]  Will anyone else be joining your video visit? No      Video Start Time: 1:06 PM    Assessment & Plan     Moderate major depression (H)  Refilled.  Feels that this dose is helping overall.  Will see Psych for review of meds as well.  - venlafaxine (EFFEXOR-XR) 37.5 MG 24 hr capsule; Take 3 capsules (112.5 mg) by mouth daily    Chronic insomnia  Continued problem- refills given today.  Discuss with Psych as well.  - zaleplon (SONATA) 10 MG capsule; Take 20mg at bedtime    Panic disorder without agoraphobia  Worsened it seems- will be nice to get this under control at some point in the near future.  - venlafaxine (EFFEXOR-XR) 37.5 MG 24 hr capsule; Take 3 capsules (112.5 mg) by mouth daily  - LORazepam (ATIVAN) 1 MG tablet; Take 1 tablet (1 mg) by mouth every 6 hours as needed for anxiety  - prazosin (MINIPRESS) 2 MG capsule; Take 1 capsule (2 mg) by mouth At Bedtime    PTSD (post-traumatic stress disorder)  Refilled.  Discuss also with Psych- continue weekly therapy.  - prazosin (MINIPRESS) 1 MG capsule; Take 1 capsule (1 mg) by mouth At Bedtime Take with 2mg capsule for a total dose of 3mg every night  - prazosin (MINIPRESS) 2 MG capsule; Take 1 capsule (2 mg) by mouth At Bedtime      Return in about 2 weeks (around 11/10/2021) for for Specialty appointment.    Eliseo Benitez PA-C  [facility]    Subjective   Kwaku is a 36 year old who presents for the following health issues     HPI     Pt calling to refill medications prior to moving to [place] in November.   He states that things are going ok and feels that some of the medications are helping.    Believes he needs refill on Ativan, Effexor and Sonata    Was recently in the ED for perianal abscess.  Had follow up  with colon and rectal surgeon who said everything looked good.    Has been having more nightmares lately and some panic attacks during the day.  Driving at night seems to be a trigger.  He has been waking at night and needing to take Ativan to get back to sleep.  Will be seeing our Psych right before moving and is set up with a Psychiatrist already in [place].  Needs a letter again today stating he is under my care and cannot work for the foreseeable future.      Review of Systems   Constitutional, HEENT, cardiovascular, pulmonary, gi and gu systems are negative, except as otherwise noted.      Objective           Vitals:  No vitals were obtained today due to virtual visit.    Physical Exam   GENERAL: Healthy, alert and no distress  EYES: Eyes grossly normal to inspection.  No discharge or erythema, or obvious scleral/conjunctival abnormalities.  RESP: No audible wheeze, cough, or visible cyanosis.  No visible retractions or increased work of breathing.    SKIN: Visible skin clear. No significant rash, abnormal pigmentation or lesions.  NEURO: Cranial nerves grossly intact.  Mentation and speech appropriate for age.  PSYCH: Mentation appears normal, affect normal/bright, judgement and insight intact, normal speech and appearance well-groomed.          Video-Visit Details    Type of service:  Video Visit    Video End Time:1:06 PM    Originating Location (pt. Location): Home    Distant Location (provider location):  [facility]     Platform used for Video Visit: [employer]

## 2021-10-27 NOTE — LETTER
October 27, 2021      RE:  Harrison Khan        To Whom it May Concern-      Harrison Khan is under my professional care.  It is my opinion that he will not be able to return to work any time in the foreseeable future.      Thank you,          Eliseo Benitez PA-C

## 2021-10-28 ENCOUNTER — MYC MEDICAL ADVICE (OUTPATIENT)
Dept: FAMILY MEDICINE | Facility: CLINIC | Age: 36
End: 2021-10-28

## 2021-11-08 ENCOUNTER — ALLIED HEALTH/NURSE VISIT (OUTPATIENT)
Dept: NURSING | Facility: CLINIC | Age: 36
End: 2021-11-08
Payer: COMMERCIAL

## 2021-11-08 DIAGNOSIS — Z23 HIGH PRIORITY FOR 2019-NCOV VACCINE: Primary | ICD-10-CM

## 2021-11-08 PROCEDURE — 0064A COVID-19,PF,MODERNA (18+ YRS BOOSTER .25ML): CPT

## 2021-11-08 PROCEDURE — 91306 COVID-19,PF,MODERNA (18+ YRS BOOSTER .25ML): CPT

## 2021-11-08 PROCEDURE — 99207 PR NO CHARGE LOS: CPT

## 2021-11-12 ENCOUNTER — VIRTUAL VISIT (OUTPATIENT)
Dept: PSYCHIATRY | Facility: CLINIC | Age: 36
End: 2021-11-12
Attending: PHYSICIAN ASSISTANT
Payer: COMMERCIAL

## 2021-11-12 DIAGNOSIS — F99 INSOMNIA DUE TO OTHER MENTAL DISORDER: ICD-10-CM

## 2021-11-12 DIAGNOSIS — F41.1 GENERALIZED ANXIETY DISORDER: ICD-10-CM

## 2021-11-12 DIAGNOSIS — F51.05 INSOMNIA DUE TO OTHER MENTAL DISORDER: ICD-10-CM

## 2021-11-12 DIAGNOSIS — F43.10 PTSD (POST-TRAUMATIC STRESS DISORDER): ICD-10-CM

## 2021-11-12 DIAGNOSIS — F32.1 MODERATE MAJOR DEPRESSION (H): Primary | ICD-10-CM

## 2021-11-12 DIAGNOSIS — F41.0 PANIC DISORDER WITHOUT AGORAPHOBIA: ICD-10-CM

## 2021-11-12 PROCEDURE — 99204 OFFICE O/P NEW MOD 45 MIN: CPT | Mod: 95 | Performed by: NURSE PRACTITIONER

## 2021-11-12 RX ORDER — HYDROXYZINE HYDROCHLORIDE 25 MG/1
25 TABLET, FILM COATED ORAL 3 TIMES DAILY PRN
Qty: 30 TABLET | Refills: 0 | Status: SHIPPED | OUTPATIENT
Start: 2021-11-12

## 2021-11-12 RX ORDER — VENLAFAXINE HYDROCHLORIDE 150 MG/1
150 CAPSULE, EXTENDED RELEASE ORAL DAILY
Qty: 30 CAPSULE | Refills: 0 | Status: SHIPPED | OUTPATIENT
Start: 2021-11-12

## 2021-11-12 NOTE — Clinical Note
Hello -in speaking with him today I understand he is moving to [place] next week.  I did make some recommendations for medication changes.  However, he remains as a consult to me.  Despite that I did prescribe 5 tablets of Belsomra 5 mg to try at bedtime instead of the Sonata.  I added hydroxyzine up to 3 times daily as needed for anxiety and increased his venlafaxine to 150 mg daily.  He is transitioning, I understand to new psychiatry services the first week in December and if you need any other assistance prior to his first appointment in [place], please do not hesitate to contact me.  Thank you for the referral.    Velma

## 2021-11-12 NOTE — PROGRESS NOTES
Kwaku is a 36 year old who is being evaluated via a billable video visit.      How would you like to obtain your AVS? MyChart  If the video visit is dropped, the invitation should be resent by: Text to cell phone: [phone]   Will anyone else be joining your video visit? No      Video Start Time: 12:54 PM  Video-Visit Details    Type of service:  Video Visit    Video End Time:1:52 PM    Originating Location (pt. Location): Home    Distant Location (provider location):  [facility]     Platform used for Video Visit: [facility]                                                              Outpatient Psychiatric Evaluation - Standard Adult    Name:  Harrison Khan  : 1985    Source of Referral:  Primary Care Provider: Eliseo Benitez PA-C   Last visit: 2021  Current Psychotherapist: none       Identifying Data:  Patient is a 36 year old,   White Not  or  male  who presents for initial visit with me.  Patient is currently on medical leave from [employer]. Patient attended the session alone. Consent to communicate signed for no one. Consent for treatment signed and included in electronic medical record. Discussed limits of confidentiality today. My Practice Policy was reviewed and signed.     Patient prefers to be called: Kwaku     Chief Complaint:    Chief Complaint   Patient presents with     MH Treatment Plan         HPI:      Kwaku is a 36-year-old male working with me today for evaluation of his current medications to manage his symptoms of anxiety and depression. In 2019 he was diagnosed with PTSD. He now is unable to work as a  and is in the process of applying for early jail. He is on medical leave. Suicide thoughts continue to plague him with the most recent thoughts occurring this week.  Kwaku told me that he has had an increase in flashback memories of  people that he  "encountered during his employment as a . He often will relive their deaths and see their faces. These thoughts are accompanied by panic attacks. He has been seeing a trauma therapist since April 2021. He feels that his medications are not helping him feel better. In particular he feels the lorazepam starts to wear off and he gets little relief from his anxiety when he takes it.    Kwaku is in the process of moving to [place] later this week. He has been working on establishing psychiatric and physical care for himself there. His first appointment with a provider will be on December 8 in [place].. His anxiety has heightened. He tells me he feels like his brain is \"on fire\". For years he has not slept well, given his shift work schedule as a . When he closes his eyes he immediately it seems he encountered during his work this makes it difficult for him to calm himself down to sleep at night and then he experiences daytime fatigue his appetite fluctuates from no appetite to binge eating.    For the past few weeks he has suffered from depression. It is difficult for him to leave his house. Yet it is difficult for him to be around his house as he entertains thoughts to shoot himself. His  is available for support during these times.  deppression    In order to get himself to stop seeing dead people, Kwaku has engaged in heavy alcohol use through all of this. For the past month he has been working on his sobriety and tells me he has been 25 days sober.    Psychiatric Review of Symptoms:  Depression: Interest: Decrease  Depressed Mood  Sleep: Decrease   Energy: Decrease  Concentration: Decrease  Suicide: No  Ruminations: Increase    PHQ-9 scores:   PHQ-9 SCORE 5/17/2021 7/21/2021 9/8/2021   PHQ-9 Total Score - - -   PHQ-9 Total Score MyChart - - -   PHQ-9 Total Score 13 17 16     Monica:  Racing Thoughts: Increase   MDQ Score: Negative Screen  Anxiety: Feeling nervous, anxious, or on " edge  Worrying too much about different things  Trouble relaxing    ELY-7 scores:    ELY-7 SCORE 5/17/2021 7/21/2021 9/8/2021   Total Score - - -   Total Score - - -   Total Score 15 17 15     Panic:  Palpitations  Shortness of Breath   Agoraphobia:  Yes   PTSD:  History of Trauma   OCD:  No symptoms   Psychosis: No symptoms   ADD / ADHD: No symptoms  Gambling or shoplifting: No   Eating Disorder:  No symptoms  Sleep:   Trouble falling asleep  Trouble staying asleep     Psychiatric History:   Hospitalizations:no  History of Commitment? No   Past Treatment: counseling, medication(s) from physician / PCP and psychiatry  Suicide Attempts:  none  Self-injurious Behavior: Denies  Electroconvulsive Therapy (ECT) or Transcranial Magnetic Stimulation (TMS): No   Genetic Testing: No     Substance Use History:  Current use of drugs or alcohol: Denies   Patient reports no problems as a result of their drinking / drug use.   Based on the clinical interview, there  are not indications of drug or alcohol abuse.  Tobacco use: Yes Cigarettes  Ready to quit?  No  Nicotine Replacement Therapy tried: none   Caffeine: No  Patient has not received chemical dependency treatment in the past  Recovery Programming Involvement: None    Past Medical History:  Past Medical History:   Diagnosis Date     Allergic rhinitis, cause unspecified      Attention deficit disorder without mention of hyperactivity      Chronic insomnia 3/3/2014     Depressive disorder 1-1-2005     Generalised anxiety disorder 7/6/2012     Migraine     Occasional hemiplegic characteristics     Obsessive-compulsive disorders     sari high      Surgery:   Past Surgical History:   Procedure Laterality Date     ARTHROSCOPY SHOULDER RT/LT      RT x 2     Allergies:     Allergies   Allergen Reactions     Wellbutrin [Bupropion] Hives     Bactrim [Sulfamethoxazole W/Trimethoprim]      Penicillins      Primary Care Provider: Eliseo Benitez PA-C  Seizures or Head Injury:  No  Diet: No Restrictions  Food Allergies: No   Exercise: No regular exercise program  Supplements: Reviewed per Electronic Medical Record Today    gabapentin (NEURONTIN) 300 MG capsule, Take 300mg at noon, 300mg at 7pm and then 900mg (three caps) at bedtime  ibuprofen (ADVIL/MOTRIN) 200 MG tablet, Take 400-600 mg by mouth every 6 hours as needed for mild pain  LORazepam (ATIVAN) 1 MG tablet, Take 1 tablet (1 mg) by mouth every 6 hours as needed for anxiety  nicotine polacrilex (NICORETTE) 2 MG gum, Place 1 each (2 mg) inside cheek as needed for smoking cessation  prazosin (MINIPRESS) 1 MG capsule, Take 1 capsule (1 mg) by mouth At Bedtime Take with 2mg capsule for a total dose of 3mg every night  prazosin (MINIPRESS) 2 MG capsule, Take 1 capsule (2 mg) by mouth At Bedtime  QUEtiapine (SEROQUEL) 25 MG tablet, Take 1-2 tablets (25-50 mg) by mouth At Bedtime  venlafaxine (EFFEXOR-XR) 37.5 MG 24 hr capsule, Take 3 capsules (112.5 mg) by mouth daily  zaleplon (SONATA) 10 MG capsule, Take 20mg at bedtime    No current facility-administered medications on file prior to visit.       The [place] Prescription Monitoring Program has been reviewed and there are no concerns about diversionary activity for controlled substances at this time.     Vital Signs:  Vitals: There were no vitals taken for this visit.    Labs:  Most recent laboratory results reviewed and the pertinent results include: Alt 92  Most recent labs reviewed and no new labs.   Most recent EKG from April2015 reviewed. QTc interval 399.  Normal EKG.    Review of Systems:  10 systems (general, cardiovascular, respiratory, eyes, ENT, endocrine, GI, , M/S, neurological) were reviewed. Most pertinent finding(s) is/are: He denies chest pain, no shortness of breath, mild muscle and joint pain, no skin rashes. The remaining systems are all unremarkable.  Family History:   Patient reported family history includes:   Family History   Problem Relation Age of Onset      Hypertension Mother      Hyperlipidemia Mother      Depression Mother      Anxiety Disorder Mother      Asthma Mother      Obesity Mother      Hypertension Father      Diabetes Father      Hyperlipidemia Father      Depression Father      Anxiety Disorder Father      Asthma Father      Obesity Father      Cardiovascular Maternal Grandmother      Cardiovascular Maternal Grandfather      Cardiovascular Paternal Grandmother      Cardiovascular Paternal Grandfather      Mental Illness History: Yes: Per EPIC Electronic Medical Record  Substance Abuse History: Unknown  Suicide History: Unknown  Medications: Unknown     Social History:     Childhood: great family upbringing and felt supported , times bullied  Siblings:  yes  Highest education level was college graduate.   Employment History:  On leave from  position  Childhood illnesses: Denies  Current Living situation:  [place], [place]  with  . Feels safe at home.  Children: none   Firearms/Weapons Access: Yes Locked up and Safety plan reviewed   Service: No    Mental Status Examination:     Appearance:  awake, alert and casually dressed  Attitude:  cooperative   Eye Contact:  adequate  Gait and Station: No assistive Devices used and No dizziness or falls  Psychomotor Behavior:  intact station, gait and muscle tone  Oriented to:  time, person, and place  Attention Span and Concentration:  Normal  Speech:  clear, coherent and Speaks: English  Mood:  anxious, depressed and better  Affect:  mood congruent  Associations:  no loose associations  Thought Process:  goal oriented  Thought Content:  no evidence of suicidal ideation or homicidal ideation, no auditory hallucinations present and no visual hallucinations present  Recent and Remote Memory:  intact Not formally assessed. No amnesia.  Fund of Knowledge: appropriate  Insight:  good  Judgment:  intact  Impulse Control:  intact    Suicide Risk Assessment:  Today Harrison Khan reports that  he is having no thoughts to want to end his life or to harm other people. In addition, there are notable risk factors for self-harm, including anxiety, withdrawing and mood change. However, risk is mitigated by commitment to family, history of seeking help when needed, future oriented, denies suicidal intent or plan and denies homicidal ideation, intent, or plan. Therefore, based on all available evidence including the factors cited above, Harrison Khan does not appear to be at imminent risk for self-harm, does not meet criteria for a 72-hr hold, and therefore remains appropriate for ongoing outpatient level of care.  A thorough assessment of risk factors related to suicide and self-harm have been reviewed and are noted above. The patient convincingly denies acute suicidality on several occasions. Local community safety resources reviewed and printed for patient to use if needed. There was no deceit detected, and the patient presented in a manner that was believable.     DSM5  Diagnosis:  296.32 (F33.1) Major Depressive Disorder, Recurrent Episode, Moderate _ and With mixed features  300.01 (F41.0) Panic Disorder  300.02 (F41.1) Generalized Anxiety Disorder  309.81 (F43.10) Posttraumatic Stress Disorder (includes Posttraumatic Stress Disorder for Children 6 Years and Younger)  Without dissociative symptoms  780.52 (G47.00) Insomnia Disorder   With non-sleep disorder mental comorbidity  Episodic      Medical Comorbidities Include:   Patient Active Problem List    Diagnosis Date Noted     Abscess of right buttock 10/14/2021     Priority: Medium     Cellulitis of right buttock 10/14/2021     Priority: Medium     Sepsis, due to unspecified organism, unspecified whether acute organ dysfunction present (H) 10/14/2021     Priority: Medium     PTSD (post-traumatic stress disorder) 05/17/2021     Priority: Medium     Uncomplicated alcohol dependence (H) 05/17/2021     Priority: Medium     Seasonal allergic rhinitis  12/19/2019     Priority: Medium     Morbid obesity (H) 11/14/2017     Priority: Medium     Obesity 09/21/2015     Priority: Medium     Panic disorder without agoraphobia 03/03/2014     Priority: Medium     Chronic insomnia 03/03/2014     Priority: Medium     Hemiplegic migraine 03/03/2014     Priority: Medium     Health Care Home 01/02/2014     Priority: Medium     EMERGENCY CARE PLAN  Presenting Problem Signs and Symptoms Treatment Plan    Questions or concerns during clinic hours    I will call the clinic directly     Questions or concerns outside clinic hours    I will call the 24 hour nurse line at [phone]    Patient needs to schedule an appointment    I will call the 24 hour scheduling team at [phone] or clinic directly    Same day treatment     I will call the clinic first, nurse line if after hours, urgent care and express care if needed                          No active care coordination at this time (01/04/14).       Moderate major depression (H) 07/06/2012     Priority: Medium     Generalized anxiety disorder 07/06/2012     Priority: Medium     Diagnosis updated by automated process. Provider to review and confirm.       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  No flowsheet data found.    The Patient Activation Measure (IVAN) score was completed and recorded in [email or web address]. This assesses patient knowledge, skill, and confidence for self-management.   IVAN Score (Last Two) 2/25/2012 9/7/2020    IVAN Raw Score 41 37   Activation Score 63.2 79.2   IVAN Level 3 4                Impression:  Harrison Khan met with me to review his medications before he moves to [place] later this week.. Kwaku is on medical leave from his position as a  due to being diagnosed with PTSD in 2019 related to his job activities. 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 I discontinued Sonata and quetiapine. I added Belsomra 5 mg at bedtime with 5 tablets maximum prescribed to him from me for now. He will not be returning to me for future care, given his appointment on  in [place] with a new psychiatric provider. Lorazepam is available to him but no refills were given to him by me. I did add hydroxyzine 25 mg 3 times daily as needed for anxiety. His venlafaxine was increased to 150 mg daily to address his depression and anxiety symptoms. Until he is seen by his new provider, Kwaku was encouraged to contact me with any questions or concerns. He verbalized understanding of this. His prazosin and gabapentin will continue as prescribed by previous provider.    Medication side effects and alternatives reviewed. Health promotion activities recommended and reviewed today. All questions addressed. Education and counseling completed regarding risks and benefits of medications and psychotherapy options. Collaborative Care Psychiatry Service model reviewed today. Recommend therapy for additional support.     Treatment Plan:     1.  Add Belsomra 5 mg at bedtime-5 tablets max prescribed from me  2.  Sonata discontinued  3.  Quetiapine discontinued  4.  Increase venlafaxine ER to 150 mg daily  5.  Add hydroxyzine 25 mg 3 times daily as needed for anxiety  6.  Lorazepam 1/2 to 1 mg daily as needed for breakthrough panic-no refills today from me  7.  Continue talk therapies  8.  See new psychiatry provider in December  when you get to [place]  9.  Continue gabapentin 300 mg twice daily and 900 mg at bedtime  10.  Continue prazosin 3 mg at bedtime with plans to increase to 5 mg at bedtime per recommendations from new psychiatrist        Continue all other medical directions per primary care provider.     Continue all other medications as reviewed per electronic medical record today.     Safety plan reviewed. To the Emergency Department as needed or call after hours crisis line at [phone] or [phone]. [place] Crisis Text Line: Text [place] to 106400  or  Suicide LifeLine Chat: [employer]Revolution [email or web address]/chat/    To schedule individual or family therapy, call [facility] at [phone].     Call [facility] at [phone] to schedule.    Follow up with primary care provider as planned or for acute medical concerns.    Call the psychiatric nurse line with medication questions or concerns at [phone].    Hive guard unlimitedhart may be used to communicate with your provider, but this is not intended to be used for emergencies.    Crisis Resources:    National Suicide Prevention Lifeline: 512.203.7660 (TTY: 930.312.4257). Call anytime for help.  (www.suicidepreventionlifeline.org)  National [place] on Mental Illness (www.lion.org): 310.934.6234 or 975-049-3692.   Mental Health Association (www.mentalhealth.org): 891.364.1079 or 212-418-5224.  [place] Crisis Text Line: Text [place] to 194568  Suicide LifeLine Chat: [employer]olution [employer].org/chat    Administrative Billing:   Time spent with patient was 60 minutes and greater than 50% of time or 40 minutes was spent in counseling and coordination of care regarding above diagnoses and treatment plan.    Patient Status:  Patient will continue to be seen for ongoing consultation and stabilization.    Signed:   MAYLIN Perez-BC   Psychiatry

## 2021-11-12 NOTE — PATIENT INSTRUCTIONS
1.  Add Belsomra 5 mg at bedtime-5 tablets max prescribed from me  2.  Sonata discontinued  3.  Quetiapine discontinued  4.  Increase venlafaxine ER to 150 mg daily  5.  Add hydroxyzine 25 mg 3 times daily as needed for anxiety  6.  Lorazepam 1/2 to 1 mg daily as needed for breakthrough panic-no refills today from me  7.  Continue talk therapies  8.  See new psychiatry provider in December when you get to [place]  9.  Continue gabapentin 300 mg twice daily and 900 mg at bedtime  10.  Continue prazosin 3 mg at bedtime with plans to increase to 5 mg at bedtime per recommendations from new psychiatrist        Continue all other medical directions per primary care provider.     Continue all other medications as reviewed per electronic medical record today.     Safety plan reviewed. To the Emergency Department as needed or call after hours crisis line at [phone] or [phone]. [place] Crisis Text Line: Text [place] to 672667  or  Suicide LifeLine Chat: [employer]org/chat/    To schedule individual or family therapy, call [facility] at [phone].     Call [facility] at [phone] to schedule.    Follow up with primary care provider as planned or for acute medical concerns.    Call the psychiatric nurse line with medication questions or concerns at [phone].    Mammotomehart may be used to communicate with your provider, but this is not intended to be used for emergencies.    Crisis Resources:    National Suicide Prevention Lifeline: 398.112.4708 (TTY: 652.916.7298). Call anytime for help.  (www.suicidepreventionlifeline.org)  National [place] on Mental Illness (www.lion.org): 313.476.6424 or 906-153-6489.   Mental Health Association (www.mentalhealth.org): 974.451.8208 or 261-117-7926.  [place] Crisis Text Line: Text [place] to 663783  Suicide LifeLine Chat: [employer].org/chat

## 2021-11-13 ENCOUNTER — MYC MEDICAL ADVICE (OUTPATIENT)
Dept: FAMILY MEDICINE | Facility: CLINIC | Age: 36
End: 2021-11-13
Payer: COMMERCIAL

## 2021-11-13 DIAGNOSIS — F51.04 CHRONIC INSOMNIA: ICD-10-CM

## 2021-11-15 NOTE — TELEPHONE ENCOUNTER
See     From psych visit 11/12/21  Treatment Plan:     1.  Add Belsomra 5 mg at bedtime-5 tablets max prescribed from me  2.  Sonata discontinued  3.  Quetiapine discontinued  4.  Increase venlafaxine ER to 150 mg daily  5.  Add hydroxyzine 25 mg 3 times daily as needed for anxiety  6.  Lorazepam 1/2 to 1 mg daily as needed for breakthrough panic-no refills today from me  7.  Continue talk therapies  8.  See new psychiatry provider in December when you get to [place]  9.  Continue gabapentin 300 mg twice daily and 900 mg at bedtime  10.  Continue prazosin 3 mg at bedtime with plans to increase to 5 mg at bedtime per recommendations from new psychiatrist    Kelli ONEILL RN

## 2021-11-16 ENCOUNTER — MYC MEDICAL ADVICE (OUTPATIENT)
Dept: PSYCHIATRY | Facility: CLINIC | Age: 36
End: 2021-11-16
Payer: COMMERCIAL

## 2021-11-16 DIAGNOSIS — F51.05 INSOMNIA DUE TO OTHER MENTAL DISORDER: ICD-10-CM

## 2021-11-16 DIAGNOSIS — F99 INSOMNIA DUE TO OTHER MENTAL DISORDER: ICD-10-CM

## 2021-11-16 NOTE — TELEPHONE ENCOUNTER
Pt requesting medication be sent to [facility] since [facility] out of stock. Cueing and routing to provider.

## 2021-11-24 RX ORDER — ZALEPLON 10 MG/1
CAPSULE ORAL
Qty: 15 CAPSULE | Refills: 0 | Status: SHIPPED | OUTPATIENT
Start: 2021-11-24

## 2021-12-27 ENCOUNTER — PATIENT OUTREACH (OUTPATIENT)
Dept: CARE COORDINATION | Facility: CLINIC | Age: 36
End: 2021-12-27
Payer: COMMERCIAL

## 2021-12-27 NOTE — LETTER
[facility] COORDINATION  [address]  [facility] [address]    January 31, 2022    Harrison Khan  [address]  [facility] [address]    Dear Harrison,  Your Care Team congratulates you on your journey to maintain wellness and a special congratulations on your move! We hope you are settling in well and establishing care with a primary care clinic, provider and services in your new location. While we recognize you have relocated out of [place], should you return this document will serve as a reminder to help you overcome any barriers you may encounter.  If you should have any questions or concerns, you can contact the members of the [facility] Care Team for assistance.         My Access Plan  Medical Emergency 911   Primary Clinic Line [facility] - [phone]   24 Hour Appointment Line [phone] or  [phone]) (toll-free)   24 Hour Nurse Line [phone] (toll-free)   Preferred Urgent Care     Preferred Hospital     Preferred Pharmacy [id number] [place], [place] - [address] AT [facility] [address]     Behavioral Health Crisis Line The National Suicide Prevention Lifeline at 1-583.648.8177 or 911     My Care Team Members  Patient Care Team       Relationship Specialty Notifications Start End    Eliseo Benitez PA-C PCP - General Physician Assistant - Medical  9/8/15     Phone: [phone] Fax: [phone]         [address] [facility] [address]    Eliseo Benitez PA-C Assigned PCP   4/11/21     Phone: [phone] Fax: [phone]         [address] [facility] [address]    Gloria Zamora LP Assigned Behavioral Health Provider   11/21/21     Phone: [phone] Fax: [phone]         [address] [facility] [address]              Goals        COMPLETED: 1. Patient achieves sleep pattern objective       Goal Statement: I will continue to work toward establishing a  positive routine and plan to improve my sleep hygiene over the next 6 moths.   Date Goal set: 7/27/2021  Barriers: Trauma from career path. Sleep study was inconclusive for resolution to current sleep concerns.  Strengths: Accepting of care coordination. Sees a provider for Trauma therapy. Takes medications for sleep.   Date to Achieve By: 1/1/2022  Patient expressed understanding of goal: yes  Action steps to achieve this goal:  1. I will follow up with scheduled appointments as recommended  2. I will take medications as prescribed.   3. I will contact my care team with questions, concerns, support needs.   4. I will use the clinic as a resource and I understand I can contact my clinic with 24/7 after hours services available.   5. I will discuss, review, schedule and complete any recommended overdue health maintenance with my provider/care team.  6. I will continue to outreach to care coordination as needed for additional resources or supports.          COMPLETED: 2. Mental Health Management (pt-stated)       Goal Statement: I will continue to take steps to further support my sobriety over the next 6 months.   Date Goal set: 7/27/2021  Barriers: Career field might conflict with ability to successfully complete group work/treatment most effectively.   Strengths: Strong support from spouse. Accepting of additional CD treatment/resources. Accepting of Care Coordination.   Date to Achieve By: 1/1/2022  Patient expressed understanding of goal: yes  Action steps to achieve this goal:  1. I will continue to follow-up with mental health provider for Trauma Therapy as recommended/scheduled.   2. I will continue to take medications as prescribed.   3. I will continue to work with Care Team to assist in improving my sleep hygiene.   4. I will review additional CD treatment options and consider establishing with supports I think would be beneficial to me.                It has been your Clinic Care Team's pleasure to work  with you on your goals.    Regards,  Your Clinic Care Team

## 2021-12-27 NOTE — PROGRESS NOTES
Clinic Care Coordination Contact  [facility]/Voicemail    Referral Source: PCP  Clinical Data: Care Coordinator Outreach.  Per chart review, SW/CC noted that pt recently moved to [place].  Outreach attempted x 1.  Left message on patient's voicemail with call back information and requested return call.  Plan: SW/Care Coordinator will follow up with pt again in 1-2 weeks, if pt does not call SW back.    Sarkis Pryor, SW/CC, for Roc Bettencourt, SW/CC

## 2022-01-20 ENCOUNTER — MYC MEDICAL ADVICE (OUTPATIENT)
Dept: FAMILY MEDICINE | Facility: CLINIC | Age: 37
End: 2022-01-20
Payer: COMMERCIAL

## 2022-01-31 NOTE — PROGRESS NOTES
Clinic Care Coordination Contact    Assessment: Care Coordinator contacted patient for 2 month follow up.  VM was left, however chart review and [employer] messaging indicates and verifies pt has moved to [place] and continues to follow the plan of care and assessment is negative for any new needs or concerns.    Enrollment status: Graduated.      Plan: No further outreaches at this time.  Patient will continue to follow the plan of care.  If new needs arise a new Care Coordination referral may be placed.    Roc Bettencourt [facility]  Clinic Care Coordinator  [facility]  [facility]  [phone]  [email or web address]

## 2022-03-11 ENCOUNTER — MYC MEDICAL ADVICE (OUTPATIENT)
Dept: FAMILY MEDICINE | Facility: CLINIC | Age: 37
End: 2022-03-11
Payer: COMMERCIAL

## 2022-03-11 NOTE — TELEPHONE ENCOUNTER
Called patient and informed that we cannot give out duplicate cards for CoVid Vaccines. Informed of the QR code on the My Chart pretty that can be used as proof of vaccine as well and printed form of immunizations.     Patient verbalized understanding of this.     Lindsay Castro MA

## 2022-03-11 NOTE — TELEPHONE ENCOUNTER
Please fill out Covid vaccine card and mail to patient.    Routed to station and TC.    Vandana Whitten RN

## 2022-03-15 NOTE — TELEPHONE ENCOUNTER
Will forward to Eliseo Benitez and the [facility].

## 2022-10-16 ENCOUNTER — HEALTH MAINTENANCE LETTER (OUTPATIENT)
Age: 37
End: 2022-10-16

## 2023-06-25 NOTE — PROGRESS NOTES
Clinic Care Coordination Contact  [facility]/Voicemail       Clinical Data: Care Coordinator Outreach  Outreach attempted x 1.  Left message on patient's voicemail with call back information and requested return call.  Plan: Care Coordinator will try to reach patient again in 10 business days.    Roc Bettencourt [facility]  Clinic Care Coordinator  [facility]  [facility]  [phone]  [email or web address]          
Clinic Care Coordination Contact  [facility]/Voicemail    Referral Source: PCP  Clinical Data: Care Coordinator Outreach  Outreach attempted x 3.  Left message on patient's voicemail with call back information and requested return call.  Plan:  Care Coordinator will try to reach patient again in 10 business days.    Roc Bettencourt [facility] [place] [facility]  Clinic Care Coordinator  [facility]  [phone]  [email or web address]          
Clinic Care Coordination Contact  [facility]/Voicemail    Referral Source: PCP  Clinical Data: Care Coordinator Outreach  Outreach attempted x 2.  Left message on patient's voicemail with call back information and requested return call.  Plan:. Care Coordinator will try to reach patient again in 10 business days.    Roc Bettencourt [facility]  Clinic Care Coordinator  [facility]  [phone]  [email or web address]          
None known

## 2023-11-04 ENCOUNTER — HEALTH MAINTENANCE LETTER (OUTPATIENT)
Age: 38
End: 2023-11-04

## 2024-02-09 NOTE — PROGRESS NOTES
Subjective     Harrison Khan is a 34 year old male who presents to clinic today for the following health issues:      Mental Health Follow-up:  Patient presents to follow-up on Depression & Anxiety.Patient's depression since last visit has been:  BetterThe patient is not having other symptoms associated with depression.Patient's anxiety since last visit has been:  BetterThe patient is not having other symptoms associated with anxiety.Any significant life events: NoPatient is not feeling anxious or having panic attacks.Patient has no concerns about alcohol or drug use.     Social History  Tobacco Use    Smoking status: Former Smoker      Types: Cigarettes    Smokeless tobacco: Former User      Types: Chew      Quit date: 4/17/2018  Alcohol use: Yes    Alcohol/week: 0.0 oz    Comment: occasional  Drug use: No      Today's PHQ-9         PHQ-9 Total Score:         PHQ-9 Q9 Thoughts of better off dead/self-harm past 2 weeks :       Thoughts of suicide or self harm:      Self-harm Plan:        Self-harm Action:          Safety concerns for self or others:            Kwaku is here to review meds today.  He is doing very well.  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  No more nights.  9-5.  Still seeing Gloria GALVAN for therapy.  Still has some trouble sleeping but this is getting better.  Happy in relationship.  Going to [place]- getting engaged maybe?    Reviewed and updated as needed this visit by Provider         Review of Systems   ROS COMP: Constitutional, HEENT, cardiovascular, pulmonary, gi and gu systems are negative, except as otherwise noted.      Objective    /70 (BP Location: Right arm, Patient Position: Chair, Cuff Size: Adult Regular)   Pulse 82   Temp 98.8  F (37.1  C) (Oral)   Resp 16   Wt 121.1 kg (267 lb)   SpO2 99%   BMI 38.31 kg/m    Body mass index is 38.31 kg/m .  Physical Exam   GENERAL: healthy, alert and no distress  PSYCH: mentation appears normal, affect  normal/bright  No further exam 20 minutes total of this face to face visit was with discussion and counselling regarding sleep, anxiety equal to >%50 of visit.      Diagnostic Test Results:  Labs reviewed in Epic        Assessment & Plan     1. Chronic insomnia  Meds refilled today.  He is not using Ativan much anymore at all.  - QUEtiapine (SEROQUEL) 25 MG tablet; Take 1 tablet (25 mg) by mouth At Bedtime  Dispense: 90 tablet; Refill: 1  - zaleplon (SONATA) 5 MG capsule; TAKE 2 CAPSULES BY MOUTH EVERY NIGHT AT BEDTIME  Dispense: 60 capsule; Refill: 3    2. PTSD (post-traumatic stress disorder)  Continue also in therapy.  PHQ-9 SCORE 4/17/2019 6/18/2019 9/23/2019   PHQ-9 Total Score - - -   PHQ-9 Total Score 2 4 2     ELY-7 SCORE 4/17/2019 6/18/2019 9/23/2019   Total Score - - -   Total Score 14 8 2       - sertraline (ZOLOFT) 100 MG tablet; Take 1 tablet (100 mg) by mouth daily  Dispense: 90 tablet; Refill: 1    3. Anxiety    - sertraline (ZOLOFT) 100 MG tablet; Take 1 tablet (100 mg) by mouth daily  Dispense: 90 tablet; Refill: 1    4. Need for prophylactic vaccination and inoculation against influenza    - INFLUENZA VACCINE IM > 6 MONTHS VALENT IIV4 [48674]  - Vaccine Administration, Initial [15374]       Return in about 6 months (around 3/20/2020) for Med Check, Follow up.    Eliseo Benitez PA-C  [facility]     full weight-bearing

## 2024-12-22 ENCOUNTER — HEALTH MAINTENANCE LETTER (OUTPATIENT)
Age: 39
End: 2024-12-22